# Patient Record
Sex: MALE | Race: OTHER | NOT HISPANIC OR LATINO
[De-identification: names, ages, dates, MRNs, and addresses within clinical notes are randomized per-mention and may not be internally consistent; named-entity substitution may affect disease eponyms.]

---

## 2017-01-05 ENCOUNTER — APPOINTMENT (OUTPATIENT)
Dept: DERMATOLOGY | Facility: CLINIC | Age: 67
End: 2017-01-05

## 2017-01-10 ENCOUNTER — APPOINTMENT (OUTPATIENT)
Dept: DERMATOLOGY | Facility: CLINIC | Age: 67
End: 2017-01-10

## 2017-01-10 VITALS
WEIGHT: 170 LBS | HEIGHT: 69 IN | DIASTOLIC BLOOD PRESSURE: 60 MMHG | SYSTOLIC BLOOD PRESSURE: 100 MMHG | BODY MASS INDEX: 25.18 KG/M2

## 2017-01-10 RX ORDER — CLOBETASOL PROPIONATE 0.5 MG/G
0.05 OINTMENT TOPICAL
Qty: 1 | Refills: 2 | Status: ACTIVE | COMMUNITY
Start: 2017-01-10 | End: 1900-01-01

## 2017-02-14 ENCOUNTER — APPOINTMENT (OUTPATIENT)
Dept: DERMATOLOGY | Facility: CLINIC | Age: 67
End: 2017-02-14

## 2017-02-14 VITALS — SYSTOLIC BLOOD PRESSURE: 120 MMHG | DIASTOLIC BLOOD PRESSURE: 80 MMHG

## 2017-03-23 ENCOUNTER — APPOINTMENT (OUTPATIENT)
Dept: DERMATOLOGY | Facility: CLINIC | Age: 67
End: 2017-03-23

## 2017-03-23 VITALS — DIASTOLIC BLOOD PRESSURE: 58 MMHG | SYSTOLIC BLOOD PRESSURE: 110 MMHG

## 2017-03-23 DIAGNOSIS — L30.9 DERMATITIS, UNSPECIFIED: ICD-10-CM

## 2017-04-18 ENCOUNTER — APPOINTMENT (OUTPATIENT)
Dept: DERMATOLOGY | Facility: CLINIC | Age: 67
End: 2017-04-18

## 2017-04-18 ENCOUNTER — LABORATORY RESULT (OUTPATIENT)
Age: 67
End: 2017-04-18

## 2017-04-18 VITALS — BODY MASS INDEX: 24.07 KG/M2 | SYSTOLIC BLOOD PRESSURE: 120 MMHG | WEIGHT: 163 LBS | DIASTOLIC BLOOD PRESSURE: 62 MMHG

## 2017-04-18 DIAGNOSIS — L98.9 DISORDER OF THE SKIN AND SUBCUTANEOUS TISSUE, UNSPECIFIED: ICD-10-CM

## 2017-05-18 ENCOUNTER — APPOINTMENT (OUTPATIENT)
Dept: DERMATOLOGY | Facility: CLINIC | Age: 67
End: 2017-05-18

## 2017-05-18 RX ORDER — GLIPIZIDE 10 MG/1
10 TABLET, FILM COATED, EXTENDED RELEASE ORAL
Qty: 60 | Refills: 0 | Status: ACTIVE | COMMUNITY
Start: 2017-01-17

## 2017-05-18 RX ORDER — METOPROLOL SUCCINATE 25 MG/1
25 TABLET, EXTENDED RELEASE ORAL
Qty: 30 | Refills: 0 | Status: ACTIVE | COMMUNITY
Start: 2017-02-13

## 2017-05-18 RX ORDER — BETAMETHASONE DIPROPIONATE 0.5 MG/G
0.05 CREAM, AUGMENTED TOPICAL
Qty: 50 | Refills: 0 | Status: ACTIVE | COMMUNITY
Start: 2017-05-17

## 2017-05-18 RX ORDER — METFORMIN HYDROCHLORIDE 1000 MG/1
1000 TABLET, COATED ORAL
Qty: 60 | Refills: 0 | Status: ACTIVE | COMMUNITY
Start: 2017-05-08

## 2017-05-18 RX ORDER — CICLOPIROX OLAMINE 7.7 MG/G
0.77 CREAM TOPICAL
Qty: 30 | Refills: 0 | Status: ACTIVE | COMMUNITY
Start: 2017-02-15

## 2017-05-18 RX ORDER — SITAGLIPTIN 100 MG/1
100 TABLET, FILM COATED ORAL
Qty: 30 | Refills: 0 | Status: ACTIVE | COMMUNITY
Start: 2017-01-17

## 2017-05-18 RX ORDER — BLOOD SUGAR DIAGNOSTIC
STRIP MISCELLANEOUS
Qty: 100 | Refills: 0 | Status: ACTIVE | COMMUNITY
Start: 2017-02-15

## 2017-06-15 ENCOUNTER — APPOINTMENT (OUTPATIENT)
Dept: DERMATOLOGY | Facility: CLINIC | Age: 67
End: 2017-06-15

## 2017-06-15 VITALS
WEIGHT: 163 LBS | DIASTOLIC BLOOD PRESSURE: 68 MMHG | SYSTOLIC BLOOD PRESSURE: 132 MMHG | BODY MASS INDEX: 24.14 KG/M2 | HEIGHT: 69 IN

## 2017-09-19 ENCOUNTER — APPOINTMENT (OUTPATIENT)
Dept: DERMATOLOGY | Facility: CLINIC | Age: 67
End: 2017-09-19
Payer: MEDICARE

## 2017-09-19 VITALS — SYSTOLIC BLOOD PRESSURE: 124 MMHG | DIASTOLIC BLOOD PRESSURE: 70 MMHG

## 2017-09-19 PROCEDURE — 54056 CRYOSURGERY PENIS LESION(S): CPT

## 2017-09-19 PROCEDURE — 99212 OFFICE O/P EST SF 10 MIN: CPT | Mod: 25

## 2017-10-04 ENCOUNTER — RX RENEWAL (OUTPATIENT)
Age: 67
End: 2017-10-04

## 2017-11-02 ENCOUNTER — APPOINTMENT (OUTPATIENT)
Dept: DERMATOLOGY | Facility: CLINIC | Age: 67
End: 2017-11-02
Payer: MEDICARE

## 2017-11-02 VITALS — DIASTOLIC BLOOD PRESSURE: 60 MMHG | SYSTOLIC BLOOD PRESSURE: 110 MMHG

## 2017-11-02 PROCEDURE — 99213 OFFICE O/P EST LOW 20 MIN: CPT | Mod: 25

## 2017-11-02 PROCEDURE — 54056 CRYOSURGERY PENIS LESION(S): CPT

## 2017-12-05 ENCOUNTER — RX RENEWAL (OUTPATIENT)
Age: 67
End: 2017-12-05

## 2017-12-19 ENCOUNTER — APPOINTMENT (OUTPATIENT)
Dept: DERMATOLOGY | Facility: CLINIC | Age: 67
End: 2017-12-19
Payer: MEDICARE

## 2017-12-19 VITALS — SYSTOLIC BLOOD PRESSURE: 122 MMHG | DIASTOLIC BLOOD PRESSURE: 68 MMHG

## 2017-12-19 PROCEDURE — 54056 CRYOSURGERY PENIS LESION(S): CPT

## 2017-12-19 PROCEDURE — 99213 OFFICE O/P EST LOW 20 MIN: CPT | Mod: 25

## 2018-01-23 ENCOUNTER — APPOINTMENT (OUTPATIENT)
Dept: DERMATOLOGY | Facility: CLINIC | Age: 68
End: 2018-01-23
Payer: MEDICARE

## 2018-01-23 PROCEDURE — 54056 CRYOSURGERY PENIS LESION(S): CPT

## 2018-01-23 PROCEDURE — 99212 OFFICE O/P EST SF 10 MIN: CPT | Mod: 25

## 2018-03-06 ENCOUNTER — APPOINTMENT (OUTPATIENT)
Dept: DERMATOLOGY | Facility: CLINIC | Age: 68
End: 2018-03-06
Payer: MEDICARE

## 2018-03-06 VITALS — DIASTOLIC BLOOD PRESSURE: 70 MMHG | SYSTOLIC BLOOD PRESSURE: 124 MMHG

## 2018-03-06 DIAGNOSIS — A63.0 ANOGENITAL (VENEREAL) WARTS: ICD-10-CM

## 2018-03-06 PROCEDURE — 99213 OFFICE O/P EST LOW 20 MIN: CPT | Mod: 25

## 2018-03-06 PROCEDURE — 54056 CRYOSURGERY PENIS LESION(S): CPT

## 2018-03-06 RX ORDER — IMIQUIMOD 50 MG/G
5 CREAM TOPICAL
Qty: 24 | Refills: 4 | Status: ACTIVE | COMMUNITY
Start: 2017-01-10 | End: 1900-01-01

## 2018-09-13 ENCOUNTER — APPOINTMENT (OUTPATIENT)
Dept: WOUND CARE | Facility: CLINIC | Age: 68
End: 2018-09-13
Payer: MEDICARE

## 2018-09-13 ENCOUNTER — INPATIENT (INPATIENT)
Facility: HOSPITAL | Age: 68
LOS: 7 days | Discharge: HOME | DRG: 617 | End: 2018-09-21
Attending: HOSPITALIST | Admitting: HOSPITALIST
Payer: COMMERCIAL

## 2018-09-13 VITALS
WEIGHT: 169.98 LBS | DIASTOLIC BLOOD PRESSURE: 79 MMHG | TEMPERATURE: 98 F | OXYGEN SATURATION: 97 % | HEART RATE: 94 BPM | HEIGHT: 68 IN | RESPIRATION RATE: 18 BRPM | SYSTOLIC BLOOD PRESSURE: 126 MMHG

## 2018-09-13 DIAGNOSIS — I25.10 ATHEROSCLEROTIC HEART DISEASE OF NATIVE CORONARY ARTERY WITHOUT ANGINA PECTORIS: ICD-10-CM

## 2018-09-13 DIAGNOSIS — M86.9 OSTEOMYELITIS, UNSPECIFIED: ICD-10-CM

## 2018-09-13 DIAGNOSIS — I10 ESSENTIAL (PRIMARY) HYPERTENSION: ICD-10-CM

## 2018-09-13 DIAGNOSIS — E78.5 HYPERLIPIDEMIA, UNSPECIFIED: ICD-10-CM

## 2018-09-13 DIAGNOSIS — Z90.49 ACQUIRED ABSENCE OF OTHER SPECIFIED PARTS OF DIGESTIVE TRACT: Chronic | ICD-10-CM

## 2018-09-13 DIAGNOSIS — Z95.5 PRESENCE OF CORONARY ANGIOPLASTY IMPLANT AND GRAFT: Chronic | ICD-10-CM

## 2018-09-13 DIAGNOSIS — Z29.9 ENCOUNTER FOR PROPHYLACTIC MEASURES, UNSPECIFIED: ICD-10-CM

## 2018-09-13 DIAGNOSIS — E11.8 TYPE 2 DIABETES MELLITUS WITH UNSPECIFIED COMPLICATIONS: ICD-10-CM

## 2018-09-13 LAB
ALBUMIN SERPL ELPH-MCNC: 4.3 G/DL — SIGNIFICANT CHANGE UP (ref 3.3–5)
ALP SERPL-CCNC: 75 U/L — SIGNIFICANT CHANGE UP (ref 40–120)
ALT FLD-CCNC: 18 U/L — SIGNIFICANT CHANGE UP (ref 10–45)
ANION GAP SERPL CALC-SCNC: 13 MMOL/L — SIGNIFICANT CHANGE UP (ref 5–17)
AST SERPL-CCNC: 23 U/L — SIGNIFICANT CHANGE UP (ref 10–40)
BASOPHILS # BLD AUTO: 0 K/UL — SIGNIFICANT CHANGE UP (ref 0–0.2)
BASOPHILS NFR BLD AUTO: 0.6 % — SIGNIFICANT CHANGE UP (ref 0–2)
BILIRUB SERPL-MCNC: 0.3 MG/DL — SIGNIFICANT CHANGE UP (ref 0.2–1.2)
BUN SERPL-MCNC: 22 MG/DL — SIGNIFICANT CHANGE UP (ref 7–23)
CALCIUM SERPL-MCNC: 9.9 MG/DL — SIGNIFICANT CHANGE UP (ref 8.4–10.5)
CHLORIDE SERPL-SCNC: 100 MMOL/L — SIGNIFICANT CHANGE UP (ref 96–108)
CO2 SERPL-SCNC: 24 MMOL/L — SIGNIFICANT CHANGE UP (ref 22–31)
CREAT SERPL-MCNC: 1.1 MG/DL — SIGNIFICANT CHANGE UP (ref 0.5–1.3)
CRP SERPL-MCNC: 0.11 MG/DL — SIGNIFICANT CHANGE UP (ref 0–0.4)
EOSINOPHIL # BLD AUTO: 0.1 K/UL — SIGNIFICANT CHANGE UP (ref 0–0.5)
EOSINOPHIL NFR BLD AUTO: 1.4 % — SIGNIFICANT CHANGE UP (ref 0–6)
ERYTHROCYTE [SEDIMENTATION RATE] IN BLOOD: 16 MM/HR — SIGNIFICANT CHANGE UP (ref 0–20)
GLUCOSE BLDC GLUCOMTR-MCNC: 131 MG/DL — HIGH (ref 70–99)
GLUCOSE SERPL-MCNC: 216 MG/DL — HIGH (ref 70–99)
HCT VFR BLD CALC: 42.6 % — SIGNIFICANT CHANGE UP (ref 39–50)
HGB BLD-MCNC: 14.3 G/DL — SIGNIFICANT CHANGE UP (ref 13–17)
LYMPHOCYTES # BLD AUTO: 1.4 K/UL — SIGNIFICANT CHANGE UP (ref 1–3.3)
LYMPHOCYTES # BLD AUTO: 19.7 % — SIGNIFICANT CHANGE UP (ref 13–44)
MCHC RBC-ENTMCNC: 29.9 PG — SIGNIFICANT CHANGE UP (ref 27–34)
MCHC RBC-ENTMCNC: 33.4 GM/DL — SIGNIFICANT CHANGE UP (ref 32–36)
MCV RBC AUTO: 89.4 FL — SIGNIFICANT CHANGE UP (ref 80–100)
MONOCYTES # BLD AUTO: 0.5 K/UL — SIGNIFICANT CHANGE UP (ref 0–0.9)
MONOCYTES NFR BLD AUTO: 7.3 % — SIGNIFICANT CHANGE UP (ref 2–14)
NEUTROPHILS # BLD AUTO: 5 K/UL — SIGNIFICANT CHANGE UP (ref 1.8–7.4)
NEUTROPHILS NFR BLD AUTO: 71 % — SIGNIFICANT CHANGE UP (ref 43–77)
PLATELET # BLD AUTO: 356 K/UL — SIGNIFICANT CHANGE UP (ref 150–400)
POTASSIUM SERPL-MCNC: 4.7 MMOL/L — SIGNIFICANT CHANGE UP (ref 3.5–5.3)
POTASSIUM SERPL-SCNC: 4.7 MMOL/L — SIGNIFICANT CHANGE UP (ref 3.5–5.3)
PROT SERPL-MCNC: 7.9 G/DL — SIGNIFICANT CHANGE UP (ref 6–8.3)
RBC # BLD: 4.77 M/UL — SIGNIFICANT CHANGE UP (ref 4.2–5.8)
RBC # FLD: 12.6 % — SIGNIFICANT CHANGE UP (ref 10.3–14.5)
SODIUM SERPL-SCNC: 137 MMOL/L — SIGNIFICANT CHANGE UP (ref 135–145)
WBC # BLD: 7 K/UL — SIGNIFICANT CHANGE UP (ref 3.8–10.5)
WBC # FLD AUTO: 7 K/UL — SIGNIFICANT CHANGE UP (ref 3.8–10.5)

## 2018-09-13 PROCEDURE — 99285 EMERGENCY DEPT VISIT HI MDM: CPT

## 2018-09-13 PROCEDURE — 99223 1ST HOSP IP/OBS HIGH 75: CPT

## 2018-09-13 PROCEDURE — 73630 X-RAY EXAM OF FOOT: CPT | Mod: 26,LT

## 2018-09-13 PROCEDURE — 99214 OFFICE O/P EST MOD 30 MIN: CPT

## 2018-09-13 RX ORDER — ASPIRIN/CALCIUM CARB/MAGNESIUM 324 MG
81 TABLET ORAL DAILY
Qty: 0 | Refills: 0 | Status: DISCONTINUED | OUTPATIENT
Start: 2018-09-13 | End: 2018-09-19

## 2018-09-13 RX ORDER — RANOLAZINE 500 MG/1
500 TABLET, FILM COATED, EXTENDED RELEASE ORAL
Qty: 0 | Refills: 0 | Status: DISCONTINUED | OUTPATIENT
Start: 2018-09-13 | End: 2018-09-19

## 2018-09-13 RX ORDER — CLOPIDOGREL BISULFATE 75 MG/1
75 TABLET, FILM COATED ORAL DAILY
Qty: 0 | Refills: 0 | Status: DISCONTINUED | OUTPATIENT
Start: 2018-09-13 | End: 2018-09-19

## 2018-09-13 RX ORDER — PIPERACILLIN AND TAZOBACTAM 4; .5 G/20ML; G/20ML
3.38 INJECTION, POWDER, LYOPHILIZED, FOR SOLUTION INTRAVENOUS EVERY 8 HOURS
Qty: 0 | Refills: 0 | Status: DISCONTINUED | OUTPATIENT
Start: 2018-09-13 | End: 2018-09-19

## 2018-09-13 RX ORDER — PIPERACILLIN AND TAZOBACTAM 4; .5 G/20ML; G/20ML
3.38 INJECTION, POWDER, LYOPHILIZED, FOR SOLUTION INTRAVENOUS ONCE
Qty: 0 | Refills: 0 | Status: COMPLETED | OUTPATIENT
Start: 2018-09-13 | End: 2018-09-13

## 2018-09-13 RX ORDER — METOPROLOL TARTRATE 50 MG
25 TABLET ORAL DAILY
Qty: 0 | Refills: 0 | Status: DISCONTINUED | OUTPATIENT
Start: 2018-09-13 | End: 2018-09-19

## 2018-09-13 RX ORDER — SODIUM CHLORIDE 9 MG/ML
1000 INJECTION, SOLUTION INTRAVENOUS
Qty: 0 | Refills: 0 | Status: DISCONTINUED | OUTPATIENT
Start: 2018-09-13 | End: 2018-09-19

## 2018-09-13 RX ORDER — VANCOMYCIN HCL 1 G
1000 VIAL (EA) INTRAVENOUS ONCE
Qty: 0 | Refills: 0 | Status: COMPLETED | OUTPATIENT
Start: 2018-09-13 | End: 2018-09-13

## 2018-09-13 RX ORDER — CILOSTAZOL 100 MG/1
50 TABLET ORAL
Qty: 0 | Refills: 0 | Status: DISCONTINUED | OUTPATIENT
Start: 2018-09-13 | End: 2018-09-19

## 2018-09-13 RX ORDER — DEXTROSE 50 % IN WATER 50 %
25 SYRINGE (ML) INTRAVENOUS ONCE
Qty: 0 | Refills: 0 | Status: DISCONTINUED | OUTPATIENT
Start: 2018-09-13 | End: 2018-09-19

## 2018-09-13 RX ORDER — GLUCAGON INJECTION, SOLUTION 0.5 MG/.1ML
1 INJECTION, SOLUTION SUBCUTANEOUS ONCE
Qty: 0 | Refills: 0 | Status: DISCONTINUED | OUTPATIENT
Start: 2018-09-13 | End: 2018-09-19

## 2018-09-13 RX ORDER — DEXTROSE 50 % IN WATER 50 %
15 SYRINGE (ML) INTRAVENOUS ONCE
Qty: 0 | Refills: 0 | Status: DISCONTINUED | OUTPATIENT
Start: 2018-09-13 | End: 2018-09-19

## 2018-09-13 RX ORDER — SIMVASTATIN 20 MG/1
80 TABLET, FILM COATED ORAL AT BEDTIME
Qty: 0 | Refills: 0 | Status: DISCONTINUED | OUTPATIENT
Start: 2018-09-13 | End: 2018-09-19

## 2018-09-13 RX ORDER — DEXTROSE 50 % IN WATER 50 %
12.5 SYRINGE (ML) INTRAVENOUS ONCE
Qty: 0 | Refills: 0 | Status: DISCONTINUED | OUTPATIENT
Start: 2018-09-13 | End: 2018-09-19

## 2018-09-13 RX ORDER — INSULIN LISPRO 100/ML
VIAL (ML) SUBCUTANEOUS
Qty: 0 | Refills: 0 | Status: DISCONTINUED | OUTPATIENT
Start: 2018-09-13 | End: 2018-09-19

## 2018-09-13 RX ORDER — VANCOMYCIN HCL 1 G
1000 VIAL (EA) INTRAVENOUS EVERY 12 HOURS
Qty: 0 | Refills: 0 | Status: DISCONTINUED | OUTPATIENT
Start: 2018-09-13 | End: 2018-09-19

## 2018-09-13 RX ADMIN — PIPERACILLIN AND TAZOBACTAM 200 GRAM(S): 4; .5 INJECTION, POWDER, LYOPHILIZED, FOR SOLUTION INTRAVENOUS at 16:48

## 2018-09-13 RX ADMIN — PIPERACILLIN AND TAZOBACTAM 3.38 GRAM(S): 4; .5 INJECTION, POWDER, LYOPHILIZED, FOR SOLUTION INTRAVENOUS at 18:51

## 2018-09-13 RX ADMIN — PIPERACILLIN AND TAZOBACTAM 25 GRAM(S): 4; .5 INJECTION, POWDER, LYOPHILIZED, FOR SOLUTION INTRAVENOUS at 21:58

## 2018-09-13 RX ADMIN — CILOSTAZOL 50 MILLIGRAM(S): 100 TABLET ORAL at 20:32

## 2018-09-13 RX ADMIN — Medication 81 MILLIGRAM(S): at 20:32

## 2018-09-13 RX ADMIN — Medication 1000 MILLIGRAM(S): at 19:51

## 2018-09-13 RX ADMIN — Medication 250 MILLIGRAM(S): at 16:48

## 2018-09-13 RX ADMIN — SIMVASTATIN 80 MILLIGRAM(S): 20 TABLET, FILM COATED ORAL at 21:58

## 2018-09-13 RX ADMIN — RANOLAZINE 500 MILLIGRAM(S): 500 TABLET, FILM COATED, EXTENDED RELEASE ORAL at 20:32

## 2018-09-13 NOTE — H&P ADULT - HISTORY OF PRESENT ILLNESS
67yo M with PMH HLD, CAD s/p stent on ASA/plavix, DMT2, presenting with left second toe wound x 3 weeks. Pt reports he first noticed wound while in [place], was started on antibiotics at that time. Pt had no improvement of wound, saw podiatrist 1 week ago and given amoxicillin. Pt reports he f/u today, had worsening of wound, and sent to ED for OM treatment. Patient denies any pain. Never had wound like this before. Denies any fever/chills, CP, SOB, abd pain, n/v. 68M with h/o  DMT2, HLD, CAD s/p stent on ASA/Plavix who presents  with c/o left second toe wound x 3 weeks. He denies LLE pain, fever/chills, CP, SOB, abd pain, n/v. Pt reports that he first noticed wound while in [place] where he was started on antibiotics. He had no improvement of wound and  saw Podiatry 1 week ago who prescribed Amoxicillin. He followed up with Podiatry today and was noted to have worsening of wound so he was sent to ED for further management.      ED COURSE  VS : 126/79  94  18  O2 97% on room air T 97.7F  Labs : wbc 7.0 h/h 14/42 plt 356 bun/cr 22/1.10  Treatment : zosyn 3.375g IVP x 1, Vancomycin 1g ivpb x 1  Seen by Podiatry

## 2018-09-13 NOTE — ED PROVIDER NOTE - OBJECTIVE STATEMENT
67yo M with PMH HLD, CAD s/p stent on ASA/plavix, DMT2, presenting with left second toe wound x 3 weeks. Pt reports he first noticed wound while in [place], was started on antibiotics at that time. Pt had no improvement of wound, saw podiatrist 1 week ago and given amoxicillin. Pt reports he f/u today, had worsening of wound, and sent to ED for OM treatment. Patient denies any pain. Never had wound like this before. Denies any fever/chills, CP, SOB, abd pain, n/v.     Podiatrist Ruben Whittaker  PCP Alejandra Jason ([facility])

## 2018-09-13 NOTE — ED PROVIDER NOTE - EXTREMITY EXAM
+ diffuse erythema/warmth/edema to left second toe with open bleeding wound at distal aspect, decreased sensation, ROM of foot/toes intact, DP pulse 2+/left lower extremity findings

## 2018-09-13 NOTE — H&P ADULT - PROBLEM SELECTOR PLAN 2
- well controlled  - pt is on Metformin, Januvia, invokana  - will hold oral hypoglycemics  - start CAMILLE  - FS qac qhs  - f/up Hb A1C  - Carb- consistent /DASH- TLC diet

## 2018-09-13 NOTE — PROGRESS NOTE ADULT - ASSESSMENT
68 year old male LEFT foot send digit ulceration, down to bone  - Pt was examined and evaluated   - WBC 7 ESR 16 CRP 0.11   - (+) Xray for OM, probing to bone wound --> pod surg planning digital vs. P2RR  - Wound culture taken --> c/w board spectrum Abx till c/s results --> rec ID c/s to follow  - Hx of occluded vessels per pt on previous vasc study --> rec FABBY/PVR as well as vasc c/s   - Rec MRI to evaluate extend of infection --> surg planning   - Please begin optimizing patient for the OR. Pt has history of CAD, rec Cards c/s as needed for possible cardiac optimization and risk stratification.  - Thank you for the consult  - D/w attending

## 2018-09-13 NOTE — H&P ADULT - EXTREMITIES COMMENTS
left LE :  erythema of left second toe with open bleeding wound at distal aspect, non tender, decreased sensation, intact ROM of foot/toes , DP pulse intact

## 2018-09-13 NOTE — H&P ADULT - FAMILY HISTORY
Sibling  Still living? Yes, Estimated age: Age Unknown  Family history of diabetes mellitus in brother, Age at diagnosis: Age Unknown

## 2018-09-13 NOTE — ED ADULT NURSE NOTE - OBJECTIVE STATEMENT
patient sent to ED by podiatrist to r/o osteomyelitis in left second toe. Patient has open wound to left second toe, swelling noted as well as foul smelling discharge. Patient states he was taking po antibiotics but has not had any improvement. Denies any fever or chills. Denies any pain in affected foot, patient states he has minimal sensation in his feet. Patient otherwise denies any pain or discomfort. Denies any vomiting or diarrhea. Patient is able to bear weight on affected foot and is able to ambulate.

## 2018-09-13 NOTE — H&P ADULT - ASSESSMENT
68M with h/o  DMT2, HLD, CAD s/p stent on ASA/Plavix who presents  with c/o left second toe wound x 3 weeks. He denies LLE pain, fever/chills. Wound was managed by outpatient Podiatry with oral abx without improvement. Physical exam is notable for redness of left second toe with open bleeding wound, malodorous drainage on distal aspect. Labs are unremarkable. Right foot XR  findings are consistent with osteomyelitis of left 2nd distal phalanx. 68M with h/o  DMT2, HLD, CAD s/p stent on ASA/Plavix who presents  with c/o left second toe wound x 3 weeks. He denies LLE pain, fever/chills. Wound was managed by outpatient Podiatry with oral abx without improvement. Physical exam is notable for redness of left second toe with open bleeding wound, malodorous drainage on distal aspect. Labs are unremarkable. Left foot XR  findings are consistent with osteomyelitis of left 2nd distal phalanx.

## 2018-09-13 NOTE — ED PROVIDER NOTE - ATTENDING CONTRIBUTION TO CARE
Patient with diabetes, developed ulcer on R 2nd toe, saw podiatry who put on antibiotics, has not improved, today saw podiatry who sent him to Emergency Department for admission.  No fevers, no chills, no other complaints.  On exam patient well appearing, vital signs within normal limits, RRR, non labored respirations, R foot 2nd toe with foul smelling ulcer.  Plain films obtained consistent with osteomyelitis of R 2nd distal phalanx, labs obtained, will start vanco/zosyn, consult podiatry, admit.

## 2018-09-13 NOTE — ED ADULT NURSE NOTE - NSIMPLEMENTINTERV_GEN_ALL_ED
Implemented All Fall with Harm Risk Interventions:  [place]aron to call system. Call bell, personal items and telephone within reach. Instruct patient to call for assistance. Room bathroom lighting operational. Non-slip footwear when patient is off stretcher. Physically safe environment: no spills, clutter or unnecessary equipment. Stretcher in lowest position, wheels locked, appropriate side rails in place. Provide visual cue, wrist band, yellow gown, etc. Monitor gait and stability. Monitor for mental status changes and reorient to person, place, and time. Review medications for side effects contributing to fall risk. Reinforce activity limits and safety measures with patient and family. Provide visual clues: red socks.

## 2018-09-13 NOTE — ED PROVIDER NOTE - PMH
CAD (coronary artery disease)    Diabetes  Type 2, Controoled, Uncomplicated, A1c: 6.4  HLD (hyperlipidemia)    HTN (hypertension)

## 2018-09-13 NOTE — H&P ADULT - NSHPLABSRESULTS_GEN_ALL_CORE
Labs reviewed :     Right foot XR  personally reviewed : osteomyelitis of left 2nd distal phalanx,    EKG personally reviewed : Labs reviewed : no leukocytosis, no anemia, bnp is wnl    Right foot XR  personally reviewed : osteomyelitis of left 2nd distal phalanx, Labs reviewed : no leukocytosis, no anemia, bnp is wnl    Left foot XR  personally reviewed : osteomyelitis of left 2nd distal phalanx,

## 2018-09-14 LAB
GLUCOSE BLDC GLUCOMTR-MCNC: 124 MG/DL — HIGH (ref 70–99)
GLUCOSE BLDC GLUCOMTR-MCNC: 177 MG/DL — HIGH (ref 70–99)
GLUCOSE BLDC GLUCOMTR-MCNC: 236 MG/DL — HIGH (ref 70–99)

## 2018-09-14 PROCEDURE — 93923 UPR/LXTR ART STDY 3+ LVLS: CPT | Mod: 26

## 2018-09-14 PROCEDURE — 93010 ELECTROCARDIOGRAM REPORT: CPT

## 2018-09-14 PROCEDURE — 73720 MRI LWR EXTREMITY W/O&W/DYE: CPT | Mod: 26,LT

## 2018-09-14 PROCEDURE — 99233 SBSQ HOSP IP/OBS HIGH 50: CPT

## 2018-09-14 RX ORDER — INFLUENZA VIRUS VACCINE 15; 15; 15; 15 UG/.5ML; UG/.5ML; UG/.5ML; UG/.5ML
0.5 SUSPENSION INTRAMUSCULAR ONCE
Qty: 0 | Refills: 0 | Status: COMPLETED | OUTPATIENT
Start: 2018-09-14 | End: 2018-09-21

## 2018-09-14 RX ADMIN — Medication 250 MILLIGRAM(S): at 05:50

## 2018-09-14 RX ADMIN — Medication 81 MILLIGRAM(S): at 12:21

## 2018-09-14 RX ADMIN — RANOLAZINE 500 MILLIGRAM(S): 500 TABLET, FILM COATED, EXTENDED RELEASE ORAL at 06:01

## 2018-09-14 RX ADMIN — Medication 25 MILLIGRAM(S): at 06:01

## 2018-09-14 RX ADMIN — PIPERACILLIN AND TAZOBACTAM 25 GRAM(S): 4; .5 INJECTION, POWDER, LYOPHILIZED, FOR SOLUTION INTRAVENOUS at 07:56

## 2018-09-14 RX ADMIN — Medication 1 TABLET(S): at 12:21

## 2018-09-14 RX ADMIN — Medication 2: at 13:19

## 2018-09-14 RX ADMIN — CILOSTAZOL 50 MILLIGRAM(S): 100 TABLET ORAL at 05:50

## 2018-09-14 RX ADMIN — SIMVASTATIN 80 MILLIGRAM(S): 20 TABLET, FILM COATED ORAL at 20:47

## 2018-09-14 RX ADMIN — RANOLAZINE 500 MILLIGRAM(S): 500 TABLET, FILM COATED, EXTENDED RELEASE ORAL at 18:23

## 2018-09-14 RX ADMIN — CLOPIDOGREL BISULFATE 75 MILLIGRAM(S): 75 TABLET, FILM COATED ORAL at 18:23

## 2018-09-14 RX ADMIN — PIPERACILLIN AND TAZOBACTAM 25 GRAM(S): 4; .5 INJECTION, POWDER, LYOPHILIZED, FOR SOLUTION INTRAVENOUS at 18:22

## 2018-09-14 RX ADMIN — Medication 250 MILLIGRAM(S): at 22:46

## 2018-09-14 NOTE — PROGRESS NOTE ADULT - PROBLEM SELECTOR PLAN 1
- c/w Zosyn/Vancomycin  - Podiatry recs appreciated - surgical intervention likely  - MRI scheduled for 8 pm tonight  - FABBY/PVRs

## 2018-09-14 NOTE — PROGRESS NOTE ADULT - ASSESSMENT
68M with h/o  DMT2, HLD, CAD s/p stent on ASA/Plavix who presents  with c/o left second toe wound x 3 weeks. He denies LLE pain, fever/chills. Wound was managed by outpatient Podiatry with oral abx without improvement. Physical exam is notable for redness of left second toe with open bleeding wound, malodorous drainage on distal aspect. Labs are unremarkable. Left foot XR  findings are consistent with osteomyelitis of left 2nd distal phalanx.

## 2018-09-14 NOTE — CONSULT NOTE ADULT - SUBJECTIVE AND OBJECTIVE BOX
HISTORY OF PRESENT ILLNESS:  68 year old male well known to our service with h/o  DMT2, HLD, CAD s/p stent on ASA/Plavix who presents  with c/o left second toe wound x 3 weeks. He denies LLE pain, fever/chills, CP, SOB, abd pain, n/v. Pt reports that he first noticed wound while in [place] where he was started on antibiotics. He had no improvement of wound and  saw Podiatry 1 week ago who prescribed Amoxicillin. He followed up with Podiatry today and was noted to have worsening of wound so he was sent to ED for further management.         Mr. Bee Harrington, in the office today for interventional cardiology consultation. As you know, he is a very pleasant 68 year old male with past medical history of hypertension, diabetes, hyperlipidemia, coronary artery disease status post stents four years ago in [place] with a repeat cath in 2016 that revealed diffuse 40% lesion in the LAD, 90% diagonal lesion, 50% obtuse marginal 1 lesion, recommended medical management, who presents today with complaints of one to two block claudication, now with worsening rest leg pain at nighttime in the left leg           PAST MEDICAL & SURGICAL HISTORY:  HTN (hypertension)  HLD (hyperlipidemia)  Diabetes: Type 2, Controoled, Uncomplicated, A1c: 6.4  CAD (coronary artery disease)  History of cholecystectomy  S/P primary angioplasty with coronary stent: x 4 stents        MEDICATIONS  (STANDING):  aspirin enteric coated 81 milliGRAM(s) Oral daily  cilostazol 50 milliGRAM(s) Oral two times a day  clopidogrel Tablet 75 milliGRAM(s) Oral daily    insulin lispro (HumaLOG) corrective regimen sliding scale   SubCutaneous three times a day before meals  metoprolol succinate ER 25 milliGRAM(s) Oral daily  multivitamin 1 Tablet(s) Oral daily  piperacillin/tazobactam IVPB. 3.375 Gram(s) IV Intermittent every 8 hours  ranolazine 500 milliGRAM(s) Oral two times a day  simvastatin 80 milliGRAM(s) Oral at bedtime  vancomycin  IVPB 1000 milliGRAM(s) IV Intermittent every 12 hours      Allergies  No Known Allergies    FAMILY HISTORY:  Family history of diabetes mellitus in brother (Sibling)  Non-contributary for premature coronary disease or sudden cardiac death    SOCIAL HISTORY:    [ x] Non-smoker  [ ] Smoker  [ x] Alcohol      REVIEW OF SYSTEMS:  [ ]chest pain  [  ]shortness of breath  [  ]palpitations  [  ]syncope  [ ]near syncope [ ]upper extremity weakness   [ ] lower extremity weakness  [  ]diplopia  [  ]altered mental status   [  ]fevers  [ ]chills [ ]nausea  [ ]vomitting  [  ]dysphagia    [ ]abdominal pain  [ ]melena  [ ]BRBPR    [  ]epistaxis  [  ]rash  [x] toe pain/wound  [ ]lower extremity edema        [x ] All others negative	  [ ] Unable to obtain    PHYSICAL EXAM:  T(C): 36.5 (09-14-18 @ 08:00), Max: 36.9 (09-14-18 @ 06:06)  HR: 75 (09-14-18 @ 08:00) (75 - 88)  BP: 115/76 (09-14-18 @ 08:00) (109/72 - 151/79)  RR: 19 (09-14-18 @ 08:00) (16 - 19)  SpO2: 98% (09-14-18 @ 08:00) (97% - 98%)  Wt(kg): --    Appearance: Normal	  HEENT:   Normal oral mucosa, PERRL, EOMI	  Lymphatic: No lymphadenopathy , no edema  Cardiovascular: Normal S1 S2, No JVD, No murmurs , Peripheral pulses palpable 2+ bilaterally  Respiratory: Lungs clear to auscultation, normal effort 	  Gastrointestinal:  Soft, Non-tender, + BS	  Skin:  left LE :  erythema of left second toe with open bleeding wound at distal aspect, non tender, decreased sensation, intact ROM of foot/toes , DP pulse intact	  No rashes, No ecchymoses, No cyanosis, warm to touch  Musculoskeletal: Normal range of motion, normal strength  Psychiatry:  Mood & affect appropriate      TELEMETRY: 	  n/a  ECG:  	 in our office 6/18 - NSR narrow QRS LVH     Echo:    from our office 1/10/18   Conclusions:   1. Normal left ventricular size with normal systolic function.   2. Mild diastolic dysfunction.   3. Normal right ventricular size and function.   4. Mild left atrial enlargement.       < from: Transthoracic Echocardiogram w/Doppler (10.24.12 @ 16:16) >  Conclusions:  1. Mitral annular calcification, otherwise normal mitral  valve. No mitral valve regurgitation seen.  2. Normal trileaflet aortic valve. No aortic valve  regurgitation seen.  3. Normal left atrium.  LA volume index = 20 cc/m2.  4. Normal left ventricular internal dimensions and wall  thickness.  5. Low normal left ventricular systolic function. EF 55%.  The basal inferior and infero-septal walls are severely  hypokinetic.  6. Mild diastolic dysfunction (Stage I).  7. Normal right atrium.  8. Normal right ventricular size and function.  9. Estimated right ventricular systolic pressure equals 27  mm Hg, assuming right atrial pressure equals 10 mm Hg,  consistent with normal pulmonary pressures.  10. Normal tricuspid valve. Mild tricuspid regurgitation.        Exercise NST:   1/26/18 in our office :   CONCLUSIONS: Abnormal Study.   Normal maximal exercise treadmill stress test.   Good exercise performance.   Abnormal myocardial perfusion SPECT images. Proximal and mid inferior wall MI with no evidence of   stress induced ischemia.   Mild segmental LV dysfunction. Calculated EF is 48%.          < from: Nuclear Stress Test, Exercise (06.27.12 @ 00:00) >  IMPRESSIONS:Abnormal Study  * Exercise capacity: 11 METS, Excellent for age and  gender.  * Chest Pain: No chest pain with exercise.  * Symptom: No Symptom.  * HR Response: Appropriate.  * BP Response: Appropriate.  * Heart Rhythm: Normal Sinus Rhythm - 79 BPM.  * ECG Changes: ST Depression: 2 mm slowly upsloping in  leads V4, V5, V6 started at 06:00 min of exercise at HR of  130 and persisted 07:00 min into recovery.  All Changes  resolved by 7 minutes of recovery.  * Arrhythmia: None.  * Myocardial Perfusion SPECT results are abnormal at 97 %  of MPHR.  * There are medium sized, moderate defects in the inferior  and inferoseptal walls that are fixed, consistent with  infarct; this defect partially corrects with prone  imgaing, suggesting a significant component of  diaphragmatic attenuation artifact is contributing to its  extent.  There is no evidence of significant ischemia.  * Post-stress gated wall motion analysis was performed  (LVEF = 49 %;LVEDV = 99 ml.)with  mild hypokinesis of the  inferior wall.  * Artifact was present as noted above.    < end of copied text >    Cath: < from: Cardiac Cath Lab - Adult (12.23.16 @ 11:27) >  CORONARY VESSELS: The coronary circulation is left dominant.  LM:   --  LM: There was a 25 % stenosis.  LAD:   --  Proximal LAD: There was a diffuse 40 % stenosis at the site of a  prior stent.  --  Distal LAD: Angiography showed mild atherosclerosis with no flow  limiting lesions.  --  D1: There was a 90 % stenosis.  CX:   --  Proximal circumflex: There was a tubular 30 % stenosis at the  site of a prior stent, in-stent.  --  OM1: There was a 50 % stenosis.  --  L AV groove: There was a tubular 20 % stenosis at the site of a prior  stent, in-stent.  --  LPDA: The vessel was very small sized. There was a 75 % stenosis.  RCA:   --  Proximal RCA: There was a 100 % stenosis.  COMPLICATIONS: There were no complications.  DIAGNOSTIC IMPRESSIONS: Patent LAD and LCx stents. Small vessel CAD in the  D1 and LPA (not amenable to PCI - too small)  DIAGNOSTIC RECOMMENDATIONS: Titrate medical therapy.  Prepared and signed by  Russell Wilder M.D.    < end of copied text >    	  	  LABS:	 	                          14.3   7.0   )-----------( 356      ( 13 Sep 2018 14:31 )             42.6     09-13    137  |  100  |  22  ----------------------------<  216<H>  4.7   |  24  |  1.10    Ca    9.9      13 Sep 2018 14:31    TPro  7.9  /  Alb  4.3  /  TBili  0.3  /  DBili  x   /  AST  23  /  ALT  18  /  AlkPhos  75  09-13      Xray Foot AP + Lateral + Oblique, Left (09.13.18 @ 15:01) >  IMPRESSION:  There is erosion of the second distal phalanx with only a portion of the   base remaining, which is dorsally displaced. Findings are compatible with   osteomyelitis.     Ulcer is noted at the tip of the second digit with surrounding soft   tissue swelling.        VA Physiol Extremity Lower 3+ Level, BI (09.14.18 @ 10:06) >  IMPRESSION:     Severe arterial occlusive disease of the left lower extremity at the   level of metatarsals. Limited compressibility/noncompressibility of the   arteries in the thigh through the ankles are suggestive of arterial   medial calcification in the setting of diabetes mellitus.    No evidence of significant arterial occlusive disease of the right lower   extremity at rest. Limited compressibility/noncompressibility of the   arteries in the thigh through the calves are suggestive of arterial   medial calcification in the setting of diabetes mellitus.          ASSESSMENT/PLAN: HISTORY OF PRESENT ILLNESS:  68 year old male well known to our service with HTN HLD DM CAD s/p stents 4 years ago in [place] with a repeat cath in 2016 that revealed diffuse 40% lesion in the LAD, 90% diagonal lesion, 50% obtuse marginal 1 lesion, treated with med management given small size of vessels not amenable to cath, with normal LV function on TTE 1/18 in our office with evidence of prox and mid inferior wall infarct but no ischemia on exercise NST in our office 1/18 (ef 48%) and no ischemia admitted with left non healing toe wound. He has failed oral antibiotics and presents for further management. He has no anginal symptoms.                 PAST MEDICAL & SURGICAL HISTORY:  HTN (hypertension)  HLD (hyperlipidemia)  Diabetes: Type 2, Controoled, Uncomplicated, A1c: 6.4  CAD (coronary artery disease)  History of cholecystectomy  S/P primary angioplasty with coronary stent: x 4 stents        MEDICATIONS  (STANDING):  aspirin enteric coated 81 milliGRAM(s) Oral daily  cilostazol 50 milliGRAM(s) Oral two times a day  clopidogrel Tablet 75 milliGRAM(s) Oral daily    insulin lispro (HumaLOG) corrective regimen sliding scale   SubCutaneous three times a day before meals  metoprolol succinate ER 25 milliGRAM(s) Oral daily  multivitamin 1 Tablet(s) Oral daily  piperacillin/tazobactam IVPB. 3.375 Gram(s) IV Intermittent every 8 hours  ranolazine 500 milliGRAM(s) Oral two times a day  simvastatin 80 milliGRAM(s) Oral at bedtime  vancomycin  IVPB 1000 milliGRAM(s) IV Intermittent every 12 hours      Allergies  No Known Allergies    FAMILY HISTORY:  Family history of diabetes mellitus in brother (Sibling)  Non-contributary for premature coronary disease or sudden cardiac death    SOCIAL HISTORY:    [ x] Non-smoker  [ ] Smoker  [ x] Alcohol      REVIEW OF SYSTEMS:  [ ]chest pain  [  ]shortness of breath  [  ]palpitations  [  ]syncope  [ ]near syncope [ ]upper extremity weakness   [ ] lower extremity weakness  [  ]diplopia  [  ]altered mental status   [  ]fevers  [ ]chills [ ]nausea  [ ]vomitting  [  ]dysphagia    [ ]abdominal pain  [ ]melena  [ ]BRBPR    [  ]epistaxis  [  ]rash  [x] toe pain/wound  [ ]lower extremity edema        [x ] All others negative	  [ ] Unable to obtain    PHYSICAL EXAM:  T(C): 36.5 (09-14-18 @ 08:00), Max: 36.9 (09-14-18 @ 06:06)  HR: 75 (09-14-18 @ 08:00) (75 - 88)  BP: 115/76 (09-14-18 @ 08:00) (109/72 - 151/79)  RR: 19 (09-14-18 @ 08:00) (16 - 19)  SpO2: 98% (09-14-18 @ 08:00) (97% - 98%)  Wt(kg): --    Appearance: Normal	  HEENT:   Normal oral mucosa, PERRL, EOMI	  Lymphatic: No lymphadenopathy , no edema  Cardiovascular: Normal S1 S2, No JVD, No murmurs , Peripheral pulses palpable 2+ bilaterally  Respiratory: Lungs clear to auscultation, normal effort 	  Gastrointestinal:  Soft, Non-tender, + BS	  Skin:  left LE :  erythema of left second toe with open bleeding wound at distal aspect, non tender, decreased sensation, intact ROM of foot/toes , DP pulse intact	  No rashes, No ecchymoses, No cyanosis, warm to touch  Musculoskeletal: Normal range of motion, normal strength  Psychiatry:  Mood & affect appropriate      TELEMETRY: 	  n/a  ECG:  	 in our office 6/18 - NSR narrow QRS LVH     Echo:    from our office 1/10/18   Conclusions:   1. Normal left ventricular size with normal systolic function.   2. Mild diastolic dysfunction.   3. Normal right ventricular size and function.   4. Mild left atrial enlargement.       < from: Transthoracic Echocardiogram w/Doppler (10.24.12 @ 16:16) >  Conclusions:  1. Mitral annular calcification, otherwise normal mitral  valve. No mitral valve regurgitation seen.  2. Normal trileaflet aortic valve. No aortic valve  regurgitation seen.  3. Normal left atrium.  LA volume index = 20 cc/m2.  4. Normal left ventricular internal dimensions and wall  thickness.  5. Low normal left ventricular systolic function. EF 55%.  The basal inferior and infero-septal walls are severely  hypokinetic.  6. Mild diastolic dysfunction (Stage I).  7. Normal right atrium.  8. Normal right ventricular size and function.  9. Estimated right ventricular systolic pressure equals 27  mm Hg, assuming right atrial pressure equals 10 mm Hg,  consistent with normal pulmonary pressures.  10. Normal tricuspid valve. Mild tricuspid regurgitation.        Exercise NST:   1/26/18 in our office :   CONCLUSIONS: Abnormal Study.   Normal maximal exercise treadmill stress test.   Good exercise performance.   Abnormal myocardial perfusion SPECT images. Proximal and mid inferior wall MI with no evidence of   stress induced ischemia.   Mild segmental LV dysfunction. Calculated EF is 48%.          < from: Nuclear Stress Test, Exercise (06.27.12 @ 00:00) >  IMPRESSIONS:Abnormal Study  * Exercise capacity: 11 METS, Excellent for age and  gender.  * Chest Pain: No chest pain with exercise.  * Symptom: No Symptom.  * HR Response: Appropriate.  * BP Response: Appropriate.  * Heart Rhythm: Normal Sinus Rhythm - 79 BPM.  * ECG Changes: ST Depression: 2 mm slowly upsloping in  leads V4, V5, V6 started at 06:00 min of exercise at HR of  130 and persisted 07:00 min into recovery.  All Changes  resolved by 7 minutes of recovery.  * Arrhythmia: None.  * Myocardial Perfusion SPECT results are abnormal at 97 %  of MPHR.  * There are medium sized, moderate defects in the inferior  and inferoseptal walls that are fixed, consistent with  infarct; this defect partially corrects with prone  imgaing, suggesting a significant component of  diaphragmatic attenuation artifact is contributing to its  extent.  There is no evidence of significant ischemia.  * Post-stress gated wall motion analysis was performed  (LVEF = 49 %;LVEDV = 99 ml.)with  mild hypokinesis of the  inferior wall.  * Artifact was present as noted above.    < end of copied text >    Cath: < from: Cardiac Cath Lab - Adult (12.23.16 @ 11:27) >  CORONARY VESSELS: The coronary circulation is left dominant.  LM:   --  LM: There was a 25 % stenosis.  LAD:   --  Proximal LAD: There was a diffuse 40 % stenosis at the site of a  prior stent.  --  Distal LAD: Angiography showed mild atherosclerosis with no flow  limiting lesions.  --  D1: There was a 90 % stenosis.  CX:   --  Proximal circumflex: There was a tubular 30 % stenosis at the  site of a prior stent, in-stent.  --  OM1: There was a 50 % stenosis.  --  L AV groove: There was a tubular 20 % stenosis at the site of a prior  stent, in-stent.  --  LPDA: The vessel was very small sized. There was a 75 % stenosis.  RCA:   --  Proximal RCA: There was a 100 % stenosis.  COMPLICATIONS: There were no complications.  DIAGNOSTIC IMPRESSIONS: Patent LAD and LCx stents. Small vessel CAD in the  D1 and LPA (not amenable to PCI - too small)  DIAGNOSTIC RECOMMENDATIONS: Titrate medical therapy.  Prepared and signed by  Russell Wilder M.D.    < end of copied text >    	  	  LABS:	 	                          14.3   7.0   )-----------( 356      ( 13 Sep 2018 14:31 )             42.6     09-13    137  |  100  |  22  ----------------------------<  216<H>  4.7   |  24  |  1.10    Ca    9.9      13 Sep 2018 14:31    TPro  7.9  /  Alb  4.3  /  TBili  0.3  /  DBili  x   /  AST  23  /  ALT  18  /  AlkPhos  75  09-13      Xray Foot AP + Lateral + Oblique, Left (09.13.18 @ 15:01) >  IMPRESSION:  There is erosion of the second distal phalanx with only a portion of the   base remaining, which is dorsally displaced. Findings are compatible with   osteomyelitis.     Ulcer is noted at the tip of the second digit with surrounding soft   tissue swelling.        VA Physiol Extremity Lower 3+ Level, BI (09.14.18 @ 10:06) >  IMPRESSION:     Severe arterial occlusive disease of the left lower extremity at the   level of metatarsals. Limited compressibility/noncompressibility of the   arteries in the thigh through the ankles are suggestive of arterial   medial calcification in the setting of diabetes mellitus.    No evidence of significant arterial occlusive disease of the right lower   extremity at rest. Limited compressibility/noncompressibility of the   arteries in the thigh through the calves are suggestive of arterial   medial calcification in the setting of diabetes mellitus.          ASSESSMENT/PLAN:  68 year old male well known to our service with HTN HLD DM CAD s/p stents 4 years ago in [place] with a repeat cath in 2016 that revealed diffuse 40% lesion in the LAD, 90% diagonal lesion, 50% obtuse marginal 1 lesion, treated with med management given small size of vessels not amenable to cath, with normal LV function on TTE 1/18 in our office with evidence of prox and mid inferior wall infarct but no ischemia on exercise NST in our office 1/18 (ef 48%) and no ischemia admitted with left non healing toe wound. He has failed oral antibiotics and presents for further management. He has no anginal symptoms.     -- check baseline EKG  -- c/w asa/statin/plavix/bb for CAD/stents  -- HD stable no evidence CHF  -- given evidence of osteomyelitis on left foot xray and severe PAD on FABBY/PVR likely will need surgery  -- podiatry noted - pending MRI LLE  -- per his RCRI score if he is for toe amputation only, he is low risk for a intermediate procedure however if he requires more invasive vascular surgery (such as bypass) he is moderate risk for a high risk procedure     - he has no anginal symptoms or evidence of CHF  -- c/w bb perioperative  -- will follow with you

## 2018-09-14 NOTE — CONSULT NOTE ADULT - SUBJECTIVE AND OBJECTIVE BOX
Patient seen and evaluated at bedside    Chief Complaint: preoperative evaluation    HPI:  68M with h/o  DMT2, HLD, CAD s/p 4 stents (in [place]) on ASA/Plavix who presents with c/o left second toe wound x 3 weeks. He denies LLE pain, fever/chills, CP, SOB, abd pain, n/v. Pt reports that he first noticed wound while in [place] where he was started on antibiotics. He had no improvement of wound and  saw Podiatry 1 week ago who prescribed Amoxicillin. He followed up with Podiatry today and was noted to have worsening of wound so he was sent to ED for further management and found to OM of the 2nd L distal phalanx. Patient is scheduled for the OR with podiatry and team is requesting pre-operative evaluation.    Patient has a history of 4 stents, 3 and 1 all of which were done in [place]. At that time, he presented with significant check pressure and neck pain. Patient does not follow up with a cardiologist.   Patient at baseline does not complain of any SOB, chest pain, palpitations, syncopal episodes. Patient states he is very active and does alot of travelling. He is able to do heavy work around the house without any complaints. Patient does admit to some SOB after 1 flight of stairs.     PMHx:   HTN (hypertension)  HLD (hyperlipidemia)  Diabetes  CAD (coronary artery disease)      PSHx:   History of cholecystectomy  S/P primary angioplasty with coronary stent      Allergies:  No Known Allergies      Current Medications:   aspirin enteric coated 81 milliGRAM(s) Oral daily  cilostazol 50 milliGRAM(s) Oral two times a day  clopidogrel Tablet 75 milliGRAM(s) Oral daily  dextrose 40% Gel 15 Gram(s) Oral once PRN  dextrose 5%. 1000 milliLiter(s) IV Continuous <Continuous>  dextrose 50% Injectable 12.5 Gram(s) IV Push once  dextrose 50% Injectable 25 Gram(s) IV Push once  dextrose 50% Injectable 25 Gram(s) IV Push once  glucagon  Injectable 1 milliGRAM(s) IntraMuscular once PRN  insulin lispro (HumaLOG) corrective regimen sliding scale   SubCutaneous three times a day before meals  metoprolol succinate ER 25 milliGRAM(s) Oral daily  multivitamin 1 Tablet(s) Oral daily  piperacillin/tazobactam IVPB. 3.375 Gram(s) IV Intermittent every 8 hours  ranolazine 500 milliGRAM(s) Oral two times a day  simvastatin 80 milliGRAM(s) Oral at bedtime  vancomycin  IVPB 1000 milliGRAM(s) IV Intermittent every 12 hours      FAMILY HISTORY:  Family history of diabetes mellitus in brother (Sibling)      Social History: , lives with spouse  Smoking History: denies  Alcohol Use: denies  Drug Use: denies    REVIEW OF SYSTEMS:  CONSTITUTIONAL: No weakness, fevers or chills  EYES/ENT: No visual changes;  No dysphagia  NECK: No pain or stiffness  RESPIRATORY: No cough, wheezing, hemoptysis; No shortness of breath  CARDIOVASCULAR: No chest pain or palpitations; No lower extremity edema  GASTROINTESTINAL: No abdominal or epigastric pain. No nausea, vomiting, or hematemesis; No diarrhea or constipation. No melena or hematochezia.  BACK: No back pain  GENITOURINARY: No dysuria, frequency or hematuria  NEUROLOGICAL: No numbness or weakness  SKIN: No itching, burning, rashes, or lesions   All other review of systems is negative unless indicated above.    Physical Exam:  T(F): 97.7 (09-14), Max: 98.5 (09-14)  HR: 75 (09-14) (75 - 88)  BP: 115/76 (09-14) (109/72 - 151/79)  RR: 19 (09-14)  SpO2: 98% (09-14)  GENERAL: No acute distress, well-developed  HEAD:  Atraumatic, Normocephalic  ENT: EOMI, PERRLA, conjunctiva and sclera clear, Neck supple, No JVD, moist mucosa  CHEST/LUNG: Clear to auscultation bilaterally; No wheeze, equal breath sounds bilaterally   BACK: No spinal tenderness  HEART: Regular rate and rhythm; No murmurs, rubs, or gallops  ABDOMEN: Soft, Nontender, Nondistended; Bowel sounds present  EXTREMITIES:  No clubbing, cyanosis, or edema  PSYCH: Nl behavior, nl affect  NEUROLOGY: AAOx3, non-focal, cranial nerves intact  SKIN: Normal color, No rashes or lesions      Cardiovascular Diagnostic Testing:    ECG: Personally reviewed:  < from: 12 Lead ECG (12.23.16 @ 10:18) >    Ventricular Rate 88 BPM    Atrial Rate 88 BPM    P-R Interval 150 ms    QRS Duration 92 ms     ms    QTc 423 ms    P Axis 51 degrees    R Axis -23 degrees    T Axis 3 degrees    Diagnosis Line NORMAL SINUS RHYTHM  INCREASED R/S RATIO IN V1, CONSIDER EARLY TRANSITION OR POSTERIOR INFARCT  ABNORMAL ECG    < end of copied text >      Cath:  < from: Cardiac Cath Lab - Adult (12.23.16 @ 11:27) >  CORONARY VESSELS: The coronary circulation is left dominant.  LM:   --  LM: There was a 25 % stenosis.  LAD:   --  Proximal LAD: There was a diffuse 40 % stenosis at the site of a  prior stent.  --  Distal LAD: Angiography showed mild atherosclerosis with no flow  limiting lesions.  --  D1: There was a 90 % stenosis.  CX:   --  Proximal circumflex: There was a tubular 30 % stenosis at the  site of a prior stent, in-stent.  --  OM1: There was a 50 % stenosis.  --  L AV groove: There was a tubular 20 % stenosis at the site of a prior  stent, in-stent.  --  LPDA: The vessel was very small sized. There was a 75 % stenosis.  RCA:   --  Proximal RCA: There was a 100 % stenosis.  COMPLICATIONS: There were no complications.  DIAGNOSTIC IMPRESSIONS: Patent LAD and LCx stents. Small vessel CAD in the  D1 and LPA (not amenable to PCI - too small)  DIAGNOSTIC RECOMMENDATIONS: Titrate medical therapy.    < end of copied text >      Labs: Personally reviewed                        14.3   7.0   )-----------( 356      ( 13 Sep 2018 14:31 )             42.6     09-13    137  |  100  |  22  ----------------------------<  216<H>  4.7   |  24  |  1.10    Ca    9.9      13 Sep 2018 14:31    TPro  7.9  /  Alb  4.3  /  TBili  0.3  /  DBili  x   /  AST  23  /  ALT  18  /  AlkPhos  75  09-13

## 2018-09-14 NOTE — PROGRESS NOTE ADULT - ASSESSMENT
68 year old male LEFT foot send digit ulceration, down to bone  - Pt was examined and evaluated   - WBC 7 ESR 16 CRP 0.11   - (+) Xray for OM, probing to bone wound --> pod surg planning digital vs. P2RR  - Wound culture taken --> c/w board spectrum Abx till c/s results --> rec ID c/s to follow  - Hx of occluded vessels per pt on previous vasc study --> LF TBI 0.28, rec vasc c/s for possible intervention and eval   - Rec MRI to evaluate extend of infection --> surg planning   -  Appreciate Card's risk stratification and clearance.   - Thank you for the consult  - D/w attending

## 2018-09-14 NOTE — CONSULT NOTE ADULT - ASSESSMENT
68M with h/o  DMT2, HLD, CAD s/p 4 stents (in [place]) on ASA/Plavix who presents with c/o left second toe wound x 3 weeks, found to have OM of 2nd left distal phalanx, requiring podiatric surgical intervention and needs pre-operative evaluation     - patient with moderate/good functional status (>5mets) & lack of cardiac symptoms   - scheduled for a low cardiac risk surgery  - RCRI 6.6% risk of major cardiac event    RECS:  CAD  - EKG unavailable ?  - continue Plavix/ASA    HLD  - continue statin perioperatively    HTN  - BPs well controlled with Toprol XL 25mg qd, continue perioperatively        full note to follow **************      Kalyani Bingham, PGY1

## 2018-09-15 DIAGNOSIS — I77.1 STRICTURE OF ARTERY: ICD-10-CM

## 2018-09-15 LAB
GLUCOSE BLDC GLUCOMTR-MCNC: 177 MG/DL — HIGH (ref 70–99)
GLUCOSE BLDC GLUCOMTR-MCNC: 183 MG/DL — HIGH (ref 70–99)
GLUCOSE BLDC GLUCOMTR-MCNC: 216 MG/DL — HIGH (ref 70–99)
GLUCOSE BLDC GLUCOMTR-MCNC: 266 MG/DL — HIGH (ref 70–99)
HBA1C BLD-MCNC: 7.1 % — HIGH (ref 4–5.6)
VANCOMYCIN TROUGH SERPL-MCNC: 12.8 UG/ML — SIGNIFICANT CHANGE UP (ref 10–20)

## 2018-09-15 PROCEDURE — 99223 1ST HOSP IP/OBS HIGH 75: CPT | Mod: GC

## 2018-09-15 PROCEDURE — 99233 SBSQ HOSP IP/OBS HIGH 50: CPT

## 2018-09-15 RX ORDER — INSULIN LISPRO 100/ML
3 VIAL (ML) SUBCUTANEOUS
Qty: 0 | Refills: 0 | Status: DISCONTINUED | OUTPATIENT
Start: 2018-09-15 | End: 2018-09-16

## 2018-09-15 RX ORDER — HEPARIN SODIUM 5000 [USP'U]/ML
5000 INJECTION INTRAVENOUS; SUBCUTANEOUS EVERY 8 HOURS
Qty: 0 | Refills: 0 | Status: DISCONTINUED | OUTPATIENT
Start: 2018-09-15 | End: 2018-09-19

## 2018-09-15 RX ORDER — INSULIN GLARGINE 100 [IU]/ML
6 INJECTION, SOLUTION SUBCUTANEOUS AT BEDTIME
Qty: 0 | Refills: 0 | Status: DISCONTINUED | OUTPATIENT
Start: 2018-09-15 | End: 2018-09-16

## 2018-09-15 RX ADMIN — Medication 250 MILLIGRAM(S): at 22:46

## 2018-09-15 RX ADMIN — RANOLAZINE 500 MILLIGRAM(S): 500 TABLET, FILM COATED, EXTENDED RELEASE ORAL at 17:36

## 2018-09-15 RX ADMIN — Medication 81 MILLIGRAM(S): at 11:16

## 2018-09-15 RX ADMIN — Medication 3 UNIT(S): at 17:34

## 2018-09-15 RX ADMIN — RANOLAZINE 500 MILLIGRAM(S): 500 TABLET, FILM COATED, EXTENDED RELEASE ORAL at 06:11

## 2018-09-15 RX ADMIN — Medication 1: at 09:13

## 2018-09-15 RX ADMIN — CLOPIDOGREL BISULFATE 75 MILLIGRAM(S): 75 TABLET, FILM COATED ORAL at 11:17

## 2018-09-15 RX ADMIN — Medication 3 UNIT(S): at 12:59

## 2018-09-15 RX ADMIN — HEPARIN SODIUM 5000 UNIT(S): 5000 INJECTION INTRAVENOUS; SUBCUTANEOUS at 14:04

## 2018-09-15 RX ADMIN — Medication 3: at 17:34

## 2018-09-15 RX ADMIN — HEPARIN SODIUM 5000 UNIT(S): 5000 INJECTION INTRAVENOUS; SUBCUTANEOUS at 22:47

## 2018-09-15 RX ADMIN — Medication 2: at 13:00

## 2018-09-15 RX ADMIN — Medication 1 TABLET(S): at 11:16

## 2018-09-15 RX ADMIN — INSULIN GLARGINE 6 UNIT(S): 100 INJECTION, SOLUTION SUBCUTANEOUS at 22:46

## 2018-09-15 RX ADMIN — PIPERACILLIN AND TAZOBACTAM 25 GRAM(S): 4; .5 INJECTION, POWDER, LYOPHILIZED, FOR SOLUTION INTRAVENOUS at 17:36

## 2018-09-15 RX ADMIN — CILOSTAZOL 50 MILLIGRAM(S): 100 TABLET ORAL at 22:47

## 2018-09-15 RX ADMIN — SIMVASTATIN 80 MILLIGRAM(S): 20 TABLET, FILM COATED ORAL at 22:47

## 2018-09-15 RX ADMIN — Medication 25 MILLIGRAM(S): at 06:11

## 2018-09-15 RX ADMIN — PIPERACILLIN AND TAZOBACTAM 25 GRAM(S): 4; .5 INJECTION, POWDER, LYOPHILIZED, FOR SOLUTION INTRAVENOUS at 01:58

## 2018-09-15 RX ADMIN — Medication 250 MILLIGRAM(S): at 09:25

## 2018-09-15 RX ADMIN — PIPERACILLIN AND TAZOBACTAM 25 GRAM(S): 4; .5 INJECTION, POWDER, LYOPHILIZED, FOR SOLUTION INTRAVENOUS at 11:15

## 2018-09-15 RX ADMIN — CILOSTAZOL 50 MILLIGRAM(S): 100 TABLET ORAL at 11:15

## 2018-09-15 NOTE — CONSULT NOTE ADULT - ASSESSMENT
ASSESSMENT: Patient is a 68M with L 2nd toe wound, planned for podiatry intervention. Vascular surgery consulted to evaluate blood flow to lower extremity.    PLAN:  - Left foot with palpable DP pulse, PT signal, indicating adequate blood flow to foot. Unlikely to be much utility in further diagnostic testing.    Discussed with vascular surgery fellow    Joao Caldera, PGY-2  Vascular Surgery [phone] ASSESSMENT: Patient is a 68M with L 2nd toe wound, planned for podiatry intervention. Vascular surgery consulted to evaluate blood flow to lower extremity.    PLAN:  -d/w pt lle angio indications risks and benefits  pt consents  tent  angio mon  will follow

## 2018-09-15 NOTE — CONSULT NOTE ADULT - SUBJECTIVE AND OBJECTIVE BOX
VASCULAR SURGERY CONSULT NOTE  --------------------------------------------------------------------------------------------    HPI:  68M with h/o  DMT2, HLD, CAD s/p stent on ASA/Plavix who presents  with c/o left second toe wound x 3 weeks. He denies LLE pain, fever/chills, CP, SOB, abd pain, n/v. Pt reports that he first noticed wound while in [place] where he was started on antibiotics. He had no improvement of wound and  saw Podiatry 1 week ago who prescribed Amoxicillin. He followed up with Podiatry today and was noted to have worsening of wound so he was sent to ED for further management.      ED COURSE  VS : 126/79  94  18  O2 97% on room air T 97.7F  Labs : wbc 7.0 h/h 14/42 plt 356 bun/cr 22/1.10  Treatment : zosyn 3.375g IVP x 1, Vancomycin 1g ivpb x 1  Seen by Podiatry    ROS: 10-system review is otherwise negative except HPI above.        PAST MEDICAL & SURGICAL HISTORY:  HTN (hypertension)  HLD (hyperlipidemia)  Diabetes: Type 2, Controoled, Uncomplicated, A1c: 6.4  CAD (coronary artery disease)  History of cholecystectomy  S/P primary angioplasty with coronary stent: x 4 stents      FAMILY HISTORY:  Family history of diabetes mellitus in brother (Sibling)      ALLERGIES: No Known Allergies      CURRENT MEDICATIONS  MEDICATIONS (STANDING): aspirin enteric coated 81 milliGRAM(s) Oral daily  cilostazol 50 milliGRAM(s) Oral two times a day  clopidogrel Tablet 75 milliGRAM(s) Oral daily  dextrose 5%. 1000 milliLiter(s) IV Continuous <Continuous>  dextrose 50% Injectable 12.5 Gram(s) IV Push once  dextrose 50% Injectable 25 Gram(s) IV Push once  dextrose 50% Injectable 25 Gram(s) IV Push once  influenza   Vaccine 0.5 milliLiter(s) IntraMuscular once  insulin lispro (HumaLOG) corrective regimen sliding scale   SubCutaneous three times a day before meals  metoprolol succinate ER 25 milliGRAM(s) Oral daily  multivitamin 1 Tablet(s) Oral daily  piperacillin/tazobactam IVPB. 3.375 Gram(s) IV Intermittent every 8 hours  ranolazine 500 milliGRAM(s) Oral two times a day  simvastatin 80 milliGRAM(s) Oral at bedtime  vancomycin  IVPB 1000 milliGRAM(s) IV Intermittent every 12 hours    MEDICATIONS (PRN):dextrose 40% Gel 15 Gram(s) Oral once PRN Blood Glucose LESS THAN 70 milliGRAM(s)/deciliter  glucagon  Injectable 1 milliGRAM(s) IntraMuscular once PRN Glucose LESS THAN 70 milligrams/deciliter    --------------------------------------------------------------------------------------------    Vitals:   T(C): 36.4 (09-14-18 @ 20:22), Max: 36.9 (09-14-18 @ 06:06)  HR: 82 (09-14-18 @ 20:22) (75 - 85)  BP: 113/75 (09-14-18 @ 20:22) (108/67 - 115/76)  RR: 18 (09-14-18 @ 20:22) (18 - 19)  SpO2: 96% (09-14-18 @ 20:22) (96% - 98%)  CAPILLARY BLOOD GLUCOSE      POCT Blood Glucose.: 177 mg/dL (14 Sep 2018 22:12)  POCT Blood Glucose.: 124 mg/dL (14 Sep 2018 18:21)  POCT Blood Glucose.: 236 mg/dL (14 Sep 2018 13:08)      09-14 @ 07:01  -  09-15 @ 03:01  --------------------------------------------------------  IN:    IV PiggyBack: 350 mL    Oral Fluid: 480 mL  Total IN: 830 mL    OUT:    Voided: 1 mL  Total OUT: 1 mL    Total NET: 829 mL    Height (cm): 172.72 (09-13 @ 12:27)  Weight (kg): 77.1 (09-13 @ 12:27)  BMI (kg/m2): 25.8 (09-13 @ 12:27)  BSA (m2): 1.91 (09-13 @ 12:27)      PHYSICAL EXAM:  General: NAD, Lying in bed comfortably  Neuro: A+Ox3  HEENT: NC/AT, EOMI  Neck: Soft, supple  Cardio: RRR, nml S1/S2  Resp: Good effort, CTA b/l  GI/Abd: Soft, NT/ND, no rebound/guarding, no masses palpated  Vascular:  - LLE: Left 2nd toe with open wound, necrotic appearing. Palpable DP pulse. PT signal present      --------------------------------------------------------------------------------------------    LABS                --------------------------------------------------------------------------------------------    MICROBIOLOGY    -> .Abscess Wound Culture (09-13 @ 21:39)     NG    NG    Few Corynebacterium species  "Susceptibilities not performed"    -> .Blood Blood Culture (09-13 @ 16:54)     NG    NG    No growth to date.      -------------------------------------------------------------------------------------------- VASCULAR SURGERY CONSULT NOTE  --------------------------------------------------------------------------------------------    HPI:  68M with h/o  DMT2, HLD, CAD s/p stent on ASA/Plavix who presents  with c/o left second toe wound x 3 weeks. He denies LLE pain, fever/chills, CP, SOB, abd pain, n/v. Pt reports that he first noticed wound while in [place] where he was started on antibiotics. He had no improvement of wound and  saw Podiatry 1 week ago who prescribed Amoxicillin. He followed up with Podiatry today and was noted to have worsening of wound so he was sent to ED for further management.    pt denies intermittent claudication or noct foot or leg cramps     ED COURSE  VS : 126/79  94  18  O2 97% on room air T 97.7F  Labs : wbc 7.0 h/h 14/42 plt 356 bun/cr 22/1.10  Treatment : zosyn 3.375g IVP x 1, Vancomycin 1g ivpb x 1  Seen by Podiatry    ROS: 10-system review is otherwise negative except HPI above.        PAST MEDICAL & SURGICAL HISTORY:  HTN (hypertension)  HLD (hyperlipidemia)  Diabetes: Type 2, Controoled, Uncomplicated, A1c: 6.4  CAD (coronary artery disease)  History of cholecystectomy  S/P primary angioplasty with coronary stent: x 4 stents      FAMILY HISTORY:  Family history of diabetes mellitus in brother (Sibling)      ALLERGIES: No Known Allergies      CURRENT MEDICATIONS  MEDICATIONS (STANDING): aspirin enteric coated 81 milliGRAM(s) Oral daily  cilostazol 50 milliGRAM(s) Oral two times a day  clopidogrel Tablet 75 milliGRAM(s) Oral daily  dextrose 5%. 1000 milliLiter(s) IV Continuous <Continuous>  dextrose 50% Injectable 12.5 Gram(s) IV Push once  dextrose 50% Injectable 25 Gram(s) IV Push once  dextrose 50% Injectable 25 Gram(s) IV Push once  influenza   Vaccine 0.5 milliLiter(s) IntraMuscular once  insulin lispro (HumaLOG) corrective regimen sliding scale   SubCutaneous three times a day before meals  metoprolol succinate ER 25 milliGRAM(s) Oral daily  multivitamin 1 Tablet(s) Oral daily  piperacillin/tazobactam IVPB. 3.375 Gram(s) IV Intermittent every 8 hours  ranolazine 500 milliGRAM(s) Oral two times a day  simvastatin 80 milliGRAM(s) Oral at bedtime  vancomycin  IVPB 1000 milliGRAM(s) IV Intermittent every 12 hours    MEDICATIONS (PRN):dextrose 40% Gel 15 Gram(s) Oral once PRN Blood Glucose LESS THAN 70 milliGRAM(s)/deciliter  glucagon  Injectable 1 milliGRAM(s) IntraMuscular once PRN Glucose LESS THAN 70 milligrams/deciliter    --------------------------------------------------------------------------------------------    Vitals:   T(C): 36.4 (09-14-18 @ 20:22), Max: 36.9 (09-14-18 @ 06:06)  HR: 82 (09-14-18 @ 20:22) (75 - 85)  BP: 113/75 (09-14-18 @ 20:22) (108/67 - 115/76)  RR: 18 (09-14-18 @ 20:22) (18 - 19)  SpO2: 96% (09-14-18 @ 20:22) (96% - 98%)  CAPILLARY BLOOD GLUCOSE      POCT Blood Glucose.: 177 mg/dL (14 Sep 2018 22:12)  POCT Blood Glucose.: 124 mg/dL (14 Sep 2018 18:21)  POCT Blood Glucose.: 236 mg/dL (14 Sep 2018 13:08)      09-14 @ 07:01  -  09-15 @ 03:01  --------------------------------------------------------  IN:    IV PiggyBack: 350 mL    Oral Fluid: 480 mL  Total IN: 830 mL    OUT:    Voided: 1 mL  Total OUT: 1 mL    Total NET: 829 mL    Height (cm): 172.72 (09-13 @ 12:27)  Weight (kg): 77.1 (09-13 @ 12:27)  BMI (kg/m2): 25.8 (09-13 @ 12:27)  BSA (m2): 1.91 (09-13 @ 12:27)      PHYSICAL EXAM:  General: NAD, Lying in bed comfortably  Neuro: A+Ox3  HEENT: NC/AT, EOMI  Neck: Soft, supple  Cardio: RRR, nml S1/S2  Resp: Good effort, CTA b/l  GI/Abd: Soft, NT/ND, no rebound/guarding, no masses palpated  Vascular:  - LLE: Left 2nd toe with open wound, necrotic appearing. Palpable DP pulse. PT signal present      --------------------------------------------------------------------------------------------    LABS                        14.3   7.0   )-----------( 356      ( 13 Sep 2018 14:31 )             42.6   09-13    137  |  100  |  22  ----------------------------<  216<H>  4.7   |  24  |  1.10    Ca    9.9      13 Sep 2018 14:31    TPro  7.9  /  Alb  4.3  /  TBili  0.3  /  DBili  x   /  AST  23  /  ALT  18  /  AlkPhos  75  09-13      --------------------------------------------------------------------------------------------    MICROBIOLOGY    -> .Abscess Wound Culture (09-13 @ 21:39)     NG    NG    Few Corynebacterium species  "Susceptibilities not performed"    -> .Blood Blood Culture (09-13 @ 16:54)     NG    NG    No growth to date.      --------------------------------------------------------------------------------------------

## 2018-09-15 NOTE — PROGRESS NOTE ADULT - PROBLEM SELECTOR PLAN 1
- c/w Zosyn/Vancomycin  - Podiatry recs appreciated - surgical intervention likely  - Follow up with MRI results  - FABBY reveals Severe arterial occlusive disease of the left lower extremity at the   level of metatarsals  - Follow up with Vascular Surgery

## 2018-09-16 LAB
ANION GAP SERPL CALC-SCNC: 10 MMOL/L — SIGNIFICANT CHANGE UP (ref 5–17)
BLD GP AB SCN SERPL QL: NEGATIVE — SIGNIFICANT CHANGE UP
BUN SERPL-MCNC: 20 MG/DL — SIGNIFICANT CHANGE UP (ref 7–23)
CALCIUM SERPL-MCNC: 9.4 MG/DL — SIGNIFICANT CHANGE UP (ref 8.4–10.5)
CHLORIDE SERPL-SCNC: 102 MMOL/L — SIGNIFICANT CHANGE UP (ref 96–108)
CO2 SERPL-SCNC: 26 MMOL/L — SIGNIFICANT CHANGE UP (ref 22–31)
CREAT SERPL-MCNC: 1.1 MG/DL — SIGNIFICANT CHANGE UP (ref 0.5–1.3)
GLUCOSE BLDC GLUCOMTR-MCNC: 171 MG/DL — HIGH (ref 70–99)
GLUCOSE BLDC GLUCOMTR-MCNC: 178 MG/DL — HIGH (ref 70–99)
GLUCOSE BLDC GLUCOMTR-MCNC: 194 MG/DL — HIGH (ref 70–99)
GLUCOSE BLDC GLUCOMTR-MCNC: 233 MG/DL — HIGH (ref 70–99)
GLUCOSE SERPL-MCNC: 215 MG/DL — HIGH (ref 70–99)
HCT VFR BLD CALC: 43.3 % — SIGNIFICANT CHANGE UP (ref 39–50)
HGB BLD-MCNC: 14.6 G/DL — SIGNIFICANT CHANGE UP (ref 13–17)
MCHC RBC-ENTMCNC: 30 PG — SIGNIFICANT CHANGE UP (ref 27–34)
MCHC RBC-ENTMCNC: 33.7 GM/DL — SIGNIFICANT CHANGE UP (ref 32–36)
MCV RBC AUTO: 89.1 FL — SIGNIFICANT CHANGE UP (ref 80–100)
PLATELET # BLD AUTO: 261 K/UL — SIGNIFICANT CHANGE UP (ref 150–400)
POTASSIUM SERPL-MCNC: 4.5 MMOL/L — SIGNIFICANT CHANGE UP (ref 3.5–5.3)
POTASSIUM SERPL-SCNC: 4.5 MMOL/L — SIGNIFICANT CHANGE UP (ref 3.5–5.3)
RBC # BLD: 4.86 M/UL — SIGNIFICANT CHANGE UP (ref 4.2–5.8)
RBC # FLD: 13.3 % — SIGNIFICANT CHANGE UP (ref 10.3–14.5)
RH IG SCN BLD-IMP: NEGATIVE — SIGNIFICANT CHANGE UP
SODIUM SERPL-SCNC: 138 MMOL/L — SIGNIFICANT CHANGE UP (ref 135–145)
WBC # BLD: 5.85 K/UL — SIGNIFICANT CHANGE UP (ref 3.8–10.5)
WBC # FLD AUTO: 5.85 K/UL — SIGNIFICANT CHANGE UP (ref 3.8–10.5)

## 2018-09-16 PROCEDURE — 99233 SBSQ HOSP IP/OBS HIGH 50: CPT

## 2018-09-16 PROCEDURE — 71045 X-RAY EXAM CHEST 1 VIEW: CPT | Mod: 26

## 2018-09-16 PROCEDURE — 99232 SBSQ HOSP IP/OBS MODERATE 35: CPT

## 2018-09-16 PROCEDURE — 99222 1ST HOSP IP/OBS MODERATE 55: CPT | Mod: GC

## 2018-09-16 PROCEDURE — 93010 ELECTROCARDIOGRAM REPORT: CPT

## 2018-09-16 RX ORDER — INSULIN GLARGINE 100 [IU]/ML
10 INJECTION, SOLUTION SUBCUTANEOUS AT BEDTIME
Qty: 0 | Refills: 0 | Status: DISCONTINUED | OUTPATIENT
Start: 2018-09-16 | End: 2018-09-18

## 2018-09-16 RX ORDER — CHLORHEXIDINE GLUCONATE 213 G/1000ML
1 SOLUTION TOPICAL ONCE
Qty: 0 | Refills: 0 | Status: DISCONTINUED | OUTPATIENT
Start: 2018-09-16 | End: 2018-09-17

## 2018-09-16 RX ORDER — INSULIN LISPRO 100/ML
6 VIAL (ML) SUBCUTANEOUS
Qty: 0 | Refills: 0 | Status: DISCONTINUED | OUTPATIENT
Start: 2018-09-16 | End: 2018-09-19

## 2018-09-16 RX ADMIN — CILOSTAZOL 50 MILLIGRAM(S): 100 TABLET ORAL at 11:17

## 2018-09-16 RX ADMIN — SIMVASTATIN 80 MILLIGRAM(S): 20 TABLET, FILM COATED ORAL at 21:11

## 2018-09-16 RX ADMIN — Medication 25 MILLIGRAM(S): at 06:32

## 2018-09-16 RX ADMIN — Medication 2: at 09:00

## 2018-09-16 RX ADMIN — Medication 6 UNIT(S): at 13:12

## 2018-09-16 RX ADMIN — PIPERACILLIN AND TAZOBACTAM 25 GRAM(S): 4; .5 INJECTION, POWDER, LYOPHILIZED, FOR SOLUTION INTRAVENOUS at 17:26

## 2018-09-16 RX ADMIN — Medication 250 MILLIGRAM(S): at 09:01

## 2018-09-16 RX ADMIN — HEPARIN SODIUM 5000 UNIT(S): 5000 INJECTION INTRAVENOUS; SUBCUTANEOUS at 06:31

## 2018-09-16 RX ADMIN — CLOPIDOGREL BISULFATE 75 MILLIGRAM(S): 75 TABLET, FILM COATED ORAL at 11:17

## 2018-09-16 RX ADMIN — Medication 1: at 13:12

## 2018-09-16 RX ADMIN — Medication 81 MILLIGRAM(S): at 11:17

## 2018-09-16 RX ADMIN — RANOLAZINE 500 MILLIGRAM(S): 500 TABLET, FILM COATED, EXTENDED RELEASE ORAL at 17:26

## 2018-09-16 RX ADMIN — PIPERACILLIN AND TAZOBACTAM 25 GRAM(S): 4; .5 INJECTION, POWDER, LYOPHILIZED, FOR SOLUTION INTRAVENOUS at 11:17

## 2018-09-16 RX ADMIN — Medication 1 TABLET(S): at 11:17

## 2018-09-16 RX ADMIN — RANOLAZINE 500 MILLIGRAM(S): 500 TABLET, FILM COATED, EXTENDED RELEASE ORAL at 06:32

## 2018-09-16 RX ADMIN — HEPARIN SODIUM 5000 UNIT(S): 5000 INJECTION INTRAVENOUS; SUBCUTANEOUS at 21:11

## 2018-09-16 RX ADMIN — CILOSTAZOL 50 MILLIGRAM(S): 100 TABLET ORAL at 21:11

## 2018-09-16 RX ADMIN — Medication 3 UNIT(S): at 09:00

## 2018-09-16 RX ADMIN — Medication 1: at 17:25

## 2018-09-16 RX ADMIN — Medication 250 MILLIGRAM(S): at 22:30

## 2018-09-16 RX ADMIN — PIPERACILLIN AND TAZOBACTAM 25 GRAM(S): 4; .5 INJECTION, POWDER, LYOPHILIZED, FOR SOLUTION INTRAVENOUS at 01:47

## 2018-09-16 RX ADMIN — Medication 6 UNIT(S): at 17:26

## 2018-09-16 RX ADMIN — INSULIN GLARGINE 10 UNIT(S): 100 INJECTION, SOLUTION SUBCUTANEOUS at 22:30

## 2018-09-16 RX ADMIN — HEPARIN SODIUM 5000 UNIT(S): 5000 INJECTION INTRAVENOUS; SUBCUTANEOUS at 13:13

## 2018-09-16 NOTE — CONSULT NOTE ADULT - SUBJECTIVE AND OBJECTIVE BOX
HPI:    68 year old male with PMH DM2 (A1C 7.1), CAD s/p stent on ASA/Plavix, HLD who presented to [facility] on 9/13 with left second toe wound of ~3 week duration. Patient noted being in [place] ~1 month ago and was admitted to the hospital for cellulitis at the same site and was treated with IV antibiotics and then transitioned to PO antibiotics. Patient is unsure of the name of either antibiotic. After return from [place] to the  he has been following with a Podiatrist who has been performing superficial debridements and wound care. No improvement in the wound despite this. He saw Podiatry ~1 week ago and was prescribed Amoxicillin. He followed up with Podiatry on the day of admission and given persistence of the wound was referred for admission.     In the ED afebrile, normotensive and not tachycardic. WBC on presentation of 7.0 without bands. ESR 16. CRP of 0.11. XR of L foot with erosion of the second distal phalanx with only a portion of the base remaining, which is dorsally displaced. Findings are compatible with osteomyelitis. Patient started on Vancomycin and Zosyn. MRI of L foot with sinus tracts at the tip of the second digit with osteomyelitis throughout the second distal and middle phalanges and at the head of the second proximal phalanx. Superficial wound cultures with Corynebacterium.     PAST MEDICAL & SURGICAL HISTORY:  HTN (hypertension)  HLD (hyperlipidemia)  Diabetes: Type 2, Controoled, Uncomplicated, A1c: 6.4  CAD (coronary artery disease)  History of cholecystectomy  S/P primary angioplasty with coronary stent: x 4 stents      Allergies  No Known Allergies        ANTIMICROBIALS:  piperacillin/tazobactam IVPB. 3.375 every 8 hours  vancomycin  IVPB 1000 every 12 hours      OTHER MEDS: MEDICATIONS  (STANDING):  aspirin enteric coated 81 daily  cilostazol 50 two times a day  clopidogrel Tablet 75 daily  dextrose 40% Gel 15 once PRN  dextrose 50% Injectable 12.5 once  dextrose 50% Injectable 25 once  dextrose 50% Injectable 25 once  glucagon  Injectable 1 once PRN  heparin  Injectable 5000 every 8 hours  influenza   Vaccine 0.5 once  insulin glargine Injectable (LANTUS) 10 at bedtime  insulin lispro (HumaLOG) corrective regimen sliding scale  three times a day before meals  insulin lispro Injectable (HumaLOG) 6 three times a day before meals  metoprolol succinate ER 25 daily  ranolazine 500 two times a day  simvastatin 80 at bedtime      SOCIAL HISTORY: Denies smoking or EtOH use. No recreational drug use. Born in [place] and moved to [place] in 1960's. Most recently travelled to [place] and [place] one month ago.     FAMILY HISTORY:  Family history of diabetes mellitus in brother (Sibling)      REVIEW OF SYSTEMS  [  ] ROS unobtainable because:    [x  ] All other systems negative except as noted below:	    Constitutional:  [ ] fever [ ] chills  [ ] weight loss  [ ] weakness  Skin:  [ ] rash [ ] phlebitis	  Eyes: [ ] icterus [ ] pain  [ ] discharge	  ENMT: [ ] sore throat  [ ] thrush [ ] ulcers [ ] exudates  Respiratory: [ ] dyspnea [ ] hemoptysis [ ] cough [ ] sputum	  Cardiovascular:  [ ] chest pain [ ] palpitations [ ] edema	  Gastrointestinal:  [ ] nausea [ ] vomiting [ ] diarrhea [ ] constipation [ ] pain	  Genitourinary:  [ ] dysuria [ ] frequency [ ] hematuria [ ] discharge [ ] flank pain  [ ] incontinence  Musculoskeletal:  [ ] myalgias [ ] arthralgias [ ] arthritis  [ ] back pain +L toe wound   Neurological:  [ ] headache [ ] seizures  [ ] confusion/altered mental status  Psychiatric:  [ ] anxiety [ ] depression	  Hematology/Lymphatics:  [ ] lymphadenopathy  Endocrine:  [ ] adrenal [ ] thyroid  Allergic/Immunologic:	 [ ] transplant [ ] seasonal    Vital Signs Last 24 Hrs  T(F): 98.4 (09-16-18 @ 12:09), Max: 98.5 (09-14-18 @ 06:06)    Vital Signs Last 24 Hrs  HR: 76 (09-16-18 @ 12:09) (69 - 83)  BP: 109/69 (09-16-18 @ 12:09) (100/66 - 123/73)  RR: 18 (09-16-18 @ 12:09)  SpO2: 98% (09-16-18 @ 12:09) (95% - 98%)  Wt(kg): --    PHYSICAL EXAM:  General: non-toxic  HEAD/EYES: anicteric, PERRL  ENT:  supple  Cardiovascular:   S1, S2  Respiratory:  clear bilaterally  GI:  soft, non-tender, normal bowel sounds  :  no CVA tenderness   Musculoskeletal:  no synovitis  Neurologic:  grossly non-focal  Skin:  no rash  Lymph: no lymphadenopathy  Psychiatric:  appropriate affect  Vascular:  no phlebitis                                14.6   5.85  )-----------( 261      ( 16 Sep 2018 09:15 )             43.3       09-16    138  |  102  |  20  ----------------------------<  215<H>  4.5   |  26  |  1.10    Ca    9.4      16 Sep 2018 07:16            MICROBIOLOGY:  .Abscess Wound  09-13-18   Moderate Corynebacterium species  "Susceptibilities not performed"  --  --    .Blood Blood  09-13-18   No growth to date.  --  --    Vancomycin Level, Trough: 12.8 ug/mL (09-15-18 @ 21:25)    RADIOLOGY:    EXAM:  MR FOOT WAW IC LT                        PROCEDURE DATE:  09/14/2018    Sinus tracts at the tip of the second digit with osteomyelitis throughout the second distal and middle phalanges and at the head of the second proximal phalanx. Marrow edema within the remainder of the second proximal phalanx is favored to be reactive at this time. Adjacent cellulitis and nonspecific myositis.    EXAM:  PHYSIOL EXTREM LOW 3+ LEV BI                        PROCEDURE DATE:  09/14/2018    Severe arterial occlusive disease of the left lower extremity at the level of metatarsals. Limited compressibility/noncompressibility of the arteries in the thigh through the ankles are suggestive of arterial medial calcification in the setting of diabetes mellitus.  No evidence of significant arterial occlusive disease of the right lower extremity at rest. Limited compressibility/noncompressibility of the arteries in the thigh through the calves are suggestive of arterial medial calcification in the setting of diabetes mellitus.    EXAM:  FOOT COMPLETE LEFT (MIN 3 VIEWS)                        PROCEDURE DATE:  09/13/2018    There is erosion of the second distal phalanx with only a portion of the base remaining, which is dorsally displaced. Findings are compatible with osteomyelitis.   Ulcer is noted at the tip of the second digit with surrounding soft tissue swelling. HPI:    68 year old male with PMH DM2 (A1C 7.1), CAD s/p stent on ASA/Plavix, HLD who presented to [facility] on 9/13 with left second toe wound of ~3 week duration. Patient noted being in [place] ~1 month ago and was admitted to the hospital for cellulitis at the same site and was treated with IV antibiotics and then transitioned to PO antibiotics. Patient is unsure of the name of either antibiotic. After return from [place] to the  he has been following with a Podiatrist who has been performing superficial debridements and wound care. No improvement in the wound despite this. He saw Podiatry ~1 week ago and was prescribed Amoxicillin. He followed up with Podiatry on the day of admission and given persistence of the wound was referred for admission.     In the ED afebrile, normotensive and not tachycardic. WBC on presentation of 7.0 without bands. ESR 16. CRP of 0.11. XR of L foot with erosion of the second distal phalanx with only a portion of the base remaining, which is dorsally displaced. Findings are compatible with osteomyelitis. Patient started on Vancomycin and Zosyn. MRI of L foot with sinus tracts at the tip of the second digit with osteomyelitis throughout the second distal and middle phalanges and at the head of the second proximal phalanx. Superficial wound cultures with Corynebacterium.     Patient denies pain - He reports intermittent parathesias on both feet. He had injured his foot about 15 years ago when he kicked a hard surface.    PAST MEDICAL & SURGICAL HISTORY:  HTN (hypertension)  HLD (hyperlipidemia)  Diabetes: Type 2, Controlled, Uncomplicated, A1c: 6.4 - currently 7.1  CAD (coronary artery disease)  History of cholecystectomy  S/P primary angioplasty with coronary stent: x 4 stents    Allergies  No Known Allergies    ANTIMICROBIALS:  piperacillin/tazobactam IVPB. 3.375 every 8 hours  vancomycin  IVPB 1000 every 12 hours      OTHER MEDS: MEDICATIONS  (STANDING):  aspirin enteric coated 81 daily  cilostazol 50 two times a day  clopidogrel Tablet 75 daily  dextrose 40% Gel 15 once PRN  dextrose 50% Injectable 12.5 once  dextrose 50% Injectable 25 once  dextrose 50% Injectable 25 once  glucagon  Injectable 1 once PRN  heparin  Injectable 5000 every 8 hours  influenza   Vaccine 0.5 once  insulin glargine Injectable (LANTUS) 10 at bedtime  insulin lispro (HumaLOG) corrective regimen sliding scale  three times a day before meals  insulin lispro Injectable (HumaLOG) 6 three times a day before meals  metoprolol succinate ER 25 daily  ranolazine 500 two times a day  simvastatin 80 at bedtime      SOCIAL HISTORY: Denies smoking or EtOH use. No recreational drug use. Born in [place] and moved to  in 1960's. Most recently travelled to [place] and [place] one month ago.     FAMILY HISTORY:  Family history of diabetes mellitus in brothers (Sibling)    REVIEW OF SYSTEMS  [  ] ROS unobtainable because:    [x  ] All other systems negative except as noted below:	    Constitutional:  [ ] fever [ ] chills  [ ] weight loss  [ ] weakness  Skin:  [ ] rash [ ] phlebitis	  Eyes: [ ] icterus [ ] pain  [ ] discharge	  ENMT: [ ] sore throat  [ ] thrush [ ] ulcers [ ] exudates  Respiratory: [ ] dyspnea [ ] hemoptysis [ ] cough [ ] sputum	  Cardiovascular:  [ ] chest pain [ ] palpitations [ ] edema	  Gastrointestinal:  [ ] nausea [ ] vomiting [ ] diarrhea [ ] constipation [ ] pain	  Genitourinary:  [ ] dysuria [ ] frequency [ ] hematuria [ ] discharge [ ] flank pain  [ ] incontinence  Musculoskeletal:  [ ] myalgias [ ] arthralgias [ ] arthritis  [ ] back pain +L toe wound   Neurological:  [ ] headache [ ] seizures  [ ] confusion/altered mental status  Psychiatric:  [ ] anxiety [ ] depression	  Hematology/Lymphatics:  [ ] lymphadenopathy  Endocrine:  [ ] adrenal [ ] thyroid  Allergic/Immunologic:	 [ ] transplant [ ] seasonal    Vital Signs Last 24 Hrs  T(F): 98.4 (09-16-18 @ 12:09), Max: 98.5 (09-14-18 @ 06:06)    Vital Signs Last 24 Hrs  HR: 76 (09-16-18 @ 12:09) (69 - 83)  BP: 109/69 (09-16-18 @ 12:09) (100/66 - 123/73)  RR: 18 (09-16-18 @ 12:09)  SpO2: 98% (09-16-18 @ 12:09) (95% - 98%)  Wt(kg): --    PHYSICAL EXAM:  General: non-toxic  HEAD/EYES: anicteric, PERRL  ENT:  supple  Cardiovascular:   S1, S2  Respiratory:  clear bilaterally  GI:  soft, non-tender, normal bowel sounds  :  no CVA tenderness   Musculoskeletal:  no synovitis  Neurologic:  grossly non-focal  Skin:  no rash  Lymph: no lymphadenopathy  Psychiatric:  appropriate affect  Vascular:  no phlebitis, no edema  ulceration left second toe with superficial eschar - Podiatry notes that ulcer probes to bone-  no purulence or proximal cellulitis               14.6   5.85  )-----------( 261      ( 16 Sep 2018 09:15 )             43.3   Sedimentation Rate, Erythrocyte (09.13.18 @ 14:31)    Sedimentation Rate, Erythrocyte: 16 mm/hr  C-Reactive Protein, Serum (09.13.18 @ 16:22)    C-Reactive Protein, Serum: 0.11 mg/dL      09-16    138  |  102  |  20  ----------------------------<  215<H>  4.5   |  26  |  1.10    Ca    9.4      16 Sep 2018 07:16    Hemoglobin A1C, Whole Blood (09.15.18 @ 08:27)    Hemoglobin A1C, Whole Blood: 7.1    MICROBIOLOGY:  .Abscess Wound  09-13-18   Moderate Corynebacterium species  "Susceptibilities not performed"  --  --    .Blood Blood  09-13-18   No growth to date.  --  --    Vancomycin Level, Trough: 12.8 ug/mL (09-15-18 @ 21:25)    RADIOLOGY:    EXAM:  MR FOOT WAW IC LT               I have viewed images of MRI scan        PROCEDURE DATE:  09/14/2018    Sinus tracts at the tip of the second digit with osteomyelitis throughout the second distal and middle phalanges and at the head of the second proximal phalanx. Marrow edema within the remainder of the second proximal phalanx is favored to be reactive at this time. Adjacent cellulitis and nonspecific myositis.    EXAM:  PHYSIOL EXTREM LOW 3+ LEV BI                        PROCEDURE DATE:  09/14/2018    Severe arterial occlusive disease of the left lower extremity at the level of metatarsals. Limited compressibility/noncompressibility of the arteries in the thigh through the ankles are suggestive of arterial medial calcification in the setting of diabetes mellitus.  No evidence of significant arterial occlusive disease of the right lower extremity at rest. Limited compressibility/noncompressibility of the arteries in the thigh through the calves are suggestive of arterial medial calcification in the setting of diabetes mellitus.    EXAM:  FOOT COMPLETE LEFT (MIN 3 VIEWS)                        PROCEDURE DATE:  09/13/2018    There is erosion of the second distal phalanx with only a portion of the base remaining, which is dorsally displaced. Findings are compatible with osteomyelitis.   Ulcer is noted at the tip of the second digit with surrounding soft tissue swelling. HPI:    68 year old male with PMH DM2 (A1C 7.1), CAD s/p stent on ASA/Plavix, HLD who presented to [facility] on 9/13 with left second toe wound of ~3 week duration. Patient noted being in [place] ~1 month ago and was admitted to the hospital for cellulitis at the same site and was treated with IV antibiotics and then transitioned to PO antibiotics. Patient is unsure of the name of either antibiotic. After return from [place] to the  he has been following with a Podiatrist who has been performing superficial debridements and wound care. No improvement in the wound despite this. He saw Podiatry ~1 week ago and was prescribed Amoxicillin. He followed up with Podiatry on the day of admission and given persistence of the wound was referred for admission.     In the ED afebrile, normotensive and not tachycardic. WBC on presentation of 7.0 without bands. ESR 16. CRP of 0.11. XR of L foot with erosion of the second distal phalanx with only a portion of the base remaining, which is dorsally displaced. Findings are compatible with osteomyelitis. Patient started on Vancomycin and Zosyn. MRI of L foot with sinus tracts at the tip of the second digit with osteomyelitis throughout the second distal and middle phalanges and at the head of the second proximal phalanx. Superficial wound cultures with Corynebacterium.     Patient denies pain - He reports intermittent parathesias on both feet. He had injured his foot about 15 years ago when he kicked a hard surface.    PAST MEDICAL & SURGICAL HISTORY:  HTN (hypertension)  HLD (hyperlipidemia)  Diabetes: Type 2, Controlled, Uncomplicated, A1c: 6.4 - currently 7.1  CAD (coronary artery disease)  History of cholecystectomy  S/P primary angioplasty with coronary stent: x 4 stents    Allergies  No Known Allergies    ANTIMICROBIALS:  piperacillin/tazobactam IVPB. 3.375 every 8 hours  vancomycin  IVPB 1000 every 12 hours      OTHER MEDS: MEDICATIONS  (STANDING):  aspirin enteric coated 81 daily  cilostazol 50 two times a day  clopidogrel Tablet 75 daily  dextrose 40% Gel 15 once PRN  dextrose 50% Injectable 12.5 once  dextrose 50% Injectable 25 once  dextrose 50% Injectable 25 once  glucagon  Injectable 1 once PRN  heparin  Injectable 5000 every 8 hours  influenza   Vaccine 0.5 once  insulin glargine Injectable (LANTUS) 10 at bedtime  insulin lispro (HumaLOG) corrective regimen sliding scale  three times a day before meals  insulin lispro Injectable (HumaLOG) 6 three times a day before meals  metoprolol succinate ER 25 daily  ranolazine 500 two times a day  simvastatin 80 at bedtime      SOCIAL HISTORY: Denies smoking or EtOH use. No recreational drug use. Born in [place] and moved to  in 1960's. Most recently travelled to [place] and [place] one month ago.     FAMILY HISTORY:  Family history of diabetes mellitus in brothers (Sibling)    REVIEW OF SYSTEMS  [  ] ROS unobtainable because:    [x  ] All other systems negative except as noted below:	    Constitutional:  [ ] fever [ ] chills  [ ] weight loss  [ ] weakness  Skin:  [ ] rash [ ] phlebitis	  Eyes: [ ] icterus [ ] pain  [ ] discharge	  ENMT: [ ] sore throat  [ ] thrush [ ] ulcers [ ] exudates  Respiratory: [ ] dyspnea [ ] hemoptysis [ ] cough [ ] sputum	  Cardiovascular:  [ ] chest pain [ ] palpitations [ ] edema	  Gastrointestinal:  [ ] nausea [ ] vomiting [ ] diarrhea [ ] constipation [ ] pain	  Genitourinary:  [ ] dysuria [ ] frequency [ ] hematuria [ ] discharge [ ] flank pain  [ ] incontinence  Musculoskeletal:  [ ] myalgias [ ] arthralgias [ ] arthritis  [ ] back pain +L toe wound   Neurological:  [ ] headache [ ] seizures  [ ] confusion/altered mental status  Psychiatric:  [ ] anxiety [ ] depression	  Hematology/Lymphatics:  [ ] lymphadenopathy  Endocrine:  [ ] adrenal [ ] thyroid  Allergic/Immunologic:	 [ ] transplant [ ] seasonal    Vital Signs Last 24 Hrs  T(F): 98.4 (09-16-18 @ 12:09), Max: 98.5 (09-14-18 @ 06:06)    Vital Signs Last 24 Hrs  HR: 76 (09-16-18 @ 12:09) (69 - 83)  BP: 109/69 (09-16-18 @ 12:09) (100/66 - 123/73)  RR: 18 (09-16-18 @ 12:09)  SpO2: 98% (09-16-18 @ 12:09) (95% - 98%)  Wt(kg): --    PHYSICAL EXAMINATION:  General: Alert and Awake, NAD  HEENT: PERRL, EOMI, No subconjunctival hemorrhages, Oropharynx Clear, MMM  Neck: Supple, No RAMÓN  Cardiac: RRR, No M/R/G  Resp: CTAB, No Wh/Rh/Ra  Abdomen: NBS, NT/ND, No HSM, No rigidity or guarding  MSK: LLE 2nd digit ulceration left second toe with superficial eschar - Podiatry notes that ulcer probes to bone-  No LE edema. No stigmata of IE. No evidence of phlebitis. No evidence of synovitis.  : No alexandra  Skin: No rashes or lesions. Skin is warm and dry to the touch.   Neuro: Alert and Awake. CN 2-12 Grossly intact. Moves all four extremities spontaneously.  Psych: Calm, Pleasant, Cooperative               14.6   5.85  )-----------( 261      ( 16 Sep 2018 09:15 )             43.3   Sedimentation Rate, Erythrocyte (09.13.18 @ 14:31)    Sedimentation Rate, Erythrocyte: 16 mm/hr  C-Reactive Protein, Serum (09.13.18 @ 16:22)    C-Reactive Protein, Serum: 0.11 mg/dL      09-16    138  |  102  |  20  ----------------------------<  215<H>  4.5   |  26  |  1.10    Ca    9.4      16 Sep 2018 07:16    Hemoglobin A1C, Whole Blood (09.15.18 @ 08:27)    Hemoglobin A1C, Whole Blood: 7.1    MICROBIOLOGY:  .Abscess Wound  09-13-18   Moderate Corynebacterium species  "Susceptibilities not performed"  --  --    .Blood Blood  09-13-18   No growth to date.  --  --    Vancomycin Level, Trough: 12.8 ug/mL (09-15-18 @ 21:25)    RADIOLOGY:    EXAM:  MR FOOT WAW IC LT               I have viewed images of MRI scan        PROCEDURE DATE:  09/14/2018    Sinus tracts at the tip of the second digit with osteomyelitis throughout the second distal and middle phalanges and at the head of the second proximal phalanx. Marrow edema within the remainder of the second proximal phalanx is favored to be reactive at this time. Adjacent cellulitis and nonspecific myositis.    EXAM:  PHYSIOL EXTREM LOW 3+ LEV BI                        PROCEDURE DATE:  09/14/2018    Severe arterial occlusive disease of the left lower extremity at the level of metatarsals. Limited compressibility/noncompressibility of the arteries in the thigh through the ankles are suggestive of arterial medial calcification in the setting of diabetes mellitus.  No evidence of significant arterial occlusive disease of the right lower extremity at rest. Limited compressibility/noncompressibility of the arteries in the thigh through the calves are suggestive of arterial medial calcification in the setting of diabetes mellitus.    EXAM:  FOOT COMPLETE LEFT (MIN 3 VIEWS)                        PROCEDURE DATE:  09/13/2018    There is erosion of the second distal phalanx with only a portion of the base remaining, which is dorsally displaced. Findings are compatible with osteomyelitis.   Ulcer is noted at the tip of the second digit with surrounding soft tissue swelling.

## 2018-09-16 NOTE — CONSULT NOTE ADULT - ASSESSMENT
68 year old male with PMH DM2 (A1C 7.1), CAD s/p stent on ASA/Plavix, HLD who presented to [facility] on 9/13 with left second toe wound of ~3 week duration.  In the ED afebrile, normotensive and not tachycardic. WBC on presentation of 7.0 without bands. ESR 16. CRP of 0.11. XR of L foot with erosion of the second distal phalanx with only a portion of the base remaining, which is dorsally displaced. Findings are compatible with osteomyelitis. Patient started on Vancomycin and Zosyn. MRI of L foot with sinus tracts at the tip of the second digit with osteomyelitis throughout the second distal and middle phalanges and at the head of the second proximal phalanx. Superficial wound cultures with Corynebacterium. 68 year old male with PMH DM2 (A1C 7.1), CAD s/p stent on ASA/Plavix, HLD who presented to [facility] on 9/13 with left second toe wound of ~3 week duration.  In the ED afebrile, normotensive and not tachycardic. WBC on presentation of 7.0 without bands. ESR 16. CRP of 0.11. XR of L foot with erosion of the second distal phalanx with only a portion of the base remaining, which is dorsally displaced. Findings are compatible with osteomyelitis. Patient started on Vancomycin and Zosyn. MRI of L foot with sinus tracts at the tip of the second digit with osteomyelitis throughout the second distal and middle phalanges and at the head of the second proximal phalanx. Superficial wound cultures with Corynebacterium.     Overall, 2nd digit OM on L Foot. Would continue vancomycin/zosyn. Corynebacterium on superficial Cx likely skin contaminant    --Continue Vancomycin 1g IV Q12H. Trough prior to fourth dose  --Continue Zosyn 3.375 mg IV Q8H  --F/U Angiography of LLE tomorrow  --F/U Podiatry recommendations

## 2018-09-16 NOTE — CONSULT NOTE ADULT - ATTENDING COMMENTS
Agree with above assessment and plan as outlined above.    - Cont periop BB  - Keep H/H >8/24, O2 sat > 92 % and MAP > 60 mmHg  - will follow with you    Jorden Anthony MD, FACC
68 year old male with PMH DM2 (A1C 7.1), CAD s/p stent on ASA/Plavix, HLD who presented to [facility] on 9/13 with left second toe wound of ~3 week duration.     LE angiography anticipated 9/17/18  toe amputation likely    Continue Jeremias Villatoroo  Please check Vanco trough level    discussed with covering NP
as above

## 2018-09-16 NOTE — PROGRESS NOTE ADULT - ASSESSMENT
68M with h/o  DMT2, HLD, CAD s/p stent on ASA/Plavix who presents  with c/o left second toe wound x 3 weeks. He denies LLE pain, fever/chills. Wound was managed by outpatient Podiatry with oral abx without improvement. Physical exam is notable for redness of left second toe with open bleeding wound, malodorous drainage on distal aspect. Labs are unremarkable. MRI of Left foot findings are consistent with osteomyelitis of left 2nd distal phalanx.

## 2018-09-16 NOTE — CHART NOTE - NSCHARTNOTEFT_GEN_A_CORE
Preop Dx: Left foot osteomyelitis   Surgeon: Dr. Gore  Procedure: Left leg angiogram, possible stent, possible angioplasty    Vital Signs Last 24 Hrs  T(C): 36.4 (16 Sep 2018 06:25), Max: 36.6 (15 Sep 2018 20:54)  T(F): 97.6 (16 Sep 2018 06:25), Max: 97.9 (15 Sep 2018 20:54)  HR: 69 (16 Sep 2018 06:25) (69 - 83)  BP: 123/73 (16 Sep 2018 06:25) (100/66 - 123/73)  BP(mean): --  RR: 18 (16 Sep 2018 06:25) (18 - 18)  SpO2: 95% (16 Sep 2018 06:25) (95% - 96%)                        14.6   5.85  )-----------( 261      ( 16 Sep 2018 09:15 )             43.3     09-16    138  |  102  |  20  ----------------------------<  215<H>  4.5   |  26  |  1.10    Ca    9.4      16 Sep 2018 07:16        Daily     Daily     EKG: ordered  CXR: ordered  Type and Screen #1: ordered    - Blood: Not needed.         Plan:9/16/18 for left leg angiogram, possible stent, possible angioplasty with Dr. Gore  - NPO after midnight except meds  - IVF while NPO  - Adjust Diabetic orders  for NPO period.  - Perioperative antibiotics not needed.  - Morning Labs: CBC, BMP, coags, type & screen ordered  - Medical clearance for OR    - Permits:  > Consent in chart.  > Case scheduled with OR.  > May continue VTE ppx perioperativley Preop Dx: Left foot osteomyelitis   Surgeon: Dr. Groe  Procedure: Left leg angiogram, possible stent, possible angioplasty    Vital Signs Last 24 Hrs  T(C): 36.4 (16 Sep 2018 06:25), Max: 36.6 (15 Sep 2018 20:54)  T(F): 97.6 (16 Sep 2018 06:25), Max: 97.9 (15 Sep 2018 20:54)  HR: 69 (16 Sep 2018 06:25) (69 - 83)  BP: 123/73 (16 Sep 2018 06:25) (100/66 - 123/73)  BP(mean): --  RR: 18 (16 Sep 2018 06:25) (18 - 18)  SpO2: 95% (16 Sep 2018 06:25) (95% - 96%)                        14.6   5.85  )-----------( 261      ( 16 Sep 2018 09:15 )             43.3     09-16    138  |  102  |  20  ----------------------------<  215<H>  4.5   |  26  |  1.10    Ca    9.4      16 Sep 2018 07:16        Daily     Daily     EKG: ordered  CXR: ordered  Type and Screen #1: ordered    - Blood: Not needed.         Plan:  - 9/17/18 for left leg angiogram, possible stent, possible angioplasty with Dr. Gore  - NPO after midnight except meds  - IVF while NPO  - Adjust Diabetic orders  for NPO period.  - Perioperative antibiotics not needed.  - Morning Labs: CBC, BMP, coags, type & screen ordered  - Medical clearance for OR    - Permits:  > Consent in chart.  > Case scheduled with OR.  > May continue VTE ppx perioperativley

## 2018-09-16 NOTE — PROGRESS NOTE ADULT - ASSESSMENT
ASSESSMENT: Patient is a 68M with L 2nd toe wound, planned for podiatry intervention. Vascular surgery consulted to evaluate blood flow to lower extremity.    PLAN:  -d/w pt lle angio indications risks and benefits  tent [facility] for monday   will follow

## 2018-09-16 NOTE — PROGRESS NOTE ADULT - PROBLEM SELECTOR PLAN 1
- c/w Zosyn/Vancomycin  - Podiatry recs appreciated - surgical intervention likely  - MRI reveals Sinus tracts at the tip of the second digit with osteomyelitis throughout the second distal and middle phalanges and at the head of the second proximal phalanx  - FABBY reveals Severe arterial occlusive disease of the left lower extremity at the   level of metatarsals  - Plan for Vascular angiogram tomorrow

## 2018-09-17 LAB
ANION GAP SERPL CALC-SCNC: 11 MMOL/L — SIGNIFICANT CHANGE UP (ref 5–17)
APTT BLD: 28.5 SEC — SIGNIFICANT CHANGE UP (ref 27.5–37.4)
BUN SERPL-MCNC: 22 MG/DL — SIGNIFICANT CHANGE UP (ref 7–23)
CALCIUM SERPL-MCNC: 10 MG/DL — SIGNIFICANT CHANGE UP (ref 8.4–10.5)
CHLORIDE SERPL-SCNC: 95 MMOL/L — LOW (ref 96–108)
CO2 SERPL-SCNC: 26 MMOL/L — SIGNIFICANT CHANGE UP (ref 22–31)
CREAT SERPL-MCNC: 1.26 MG/DL — SIGNIFICANT CHANGE UP (ref 0.5–1.3)
GLUCOSE BLDC GLUCOMTR-MCNC: 172 MG/DL — HIGH (ref 70–99)
GLUCOSE BLDC GLUCOMTR-MCNC: 178 MG/DL — HIGH (ref 70–99)
GLUCOSE BLDC GLUCOMTR-MCNC: 195 MG/DL — HIGH (ref 70–99)
GLUCOSE BLDC GLUCOMTR-MCNC: 200 MG/DL — HIGH (ref 70–99)
GLUCOSE SERPL-MCNC: 232 MG/DL — HIGH (ref 70–99)
HCT VFR BLD CALC: 44.9 % — SIGNIFICANT CHANGE UP (ref 39–50)
HGB BLD-MCNC: 15.6 G/DL — SIGNIFICANT CHANGE UP (ref 13–17)
INR BLD: 1.02 RATIO — SIGNIFICANT CHANGE UP (ref 0.88–1.16)
MCHC RBC-ENTMCNC: 30.6 PG — SIGNIFICANT CHANGE UP (ref 27–34)
MCHC RBC-ENTMCNC: 34.7 GM/DL — SIGNIFICANT CHANGE UP (ref 32–36)
MCV RBC AUTO: 88 FL — SIGNIFICANT CHANGE UP (ref 80–100)
PLATELET # BLD AUTO: 271 K/UL — SIGNIFICANT CHANGE UP (ref 150–400)
POTASSIUM SERPL-MCNC: 3.7 MMOL/L — SIGNIFICANT CHANGE UP (ref 3.5–5.3)
POTASSIUM SERPL-SCNC: 3.7 MMOL/L — SIGNIFICANT CHANGE UP (ref 3.5–5.3)
PROTHROM AB SERPL-ACNC: 11.5 SEC — SIGNIFICANT CHANGE UP (ref 10–13.1)
RBC # BLD: 5.1 M/UL — SIGNIFICANT CHANGE UP (ref 4.2–5.8)
RBC # FLD: 13.6 % — SIGNIFICANT CHANGE UP (ref 10.3–14.5)
SODIUM SERPL-SCNC: 132 MMOL/L — LOW (ref 135–145)
VANCOMYCIN FLD-MCNC: 14.4 UG/ML — SIGNIFICANT CHANGE UP
WBC # BLD: 5.63 K/UL — SIGNIFICANT CHANGE UP (ref 3.8–10.5)
WBC # FLD AUTO: 5.63 K/UL — SIGNIFICANT CHANGE UP (ref 3.8–10.5)

## 2018-09-17 PROCEDURE — 36245 INS CATH ABD/L-EXT ART 1ST: CPT | Mod: LT

## 2018-09-17 PROCEDURE — 75625 CONTRAST EXAM ABDOMINL AORTA: CPT | Mod: 26

## 2018-09-17 PROCEDURE — 75710 ARTERY X-RAYS ARM/LEG: CPT | Mod: 26,LT

## 2018-09-17 PROCEDURE — 99231 SBSQ HOSP IP/OBS SF/LOW 25: CPT

## 2018-09-17 PROCEDURE — 99233 SBSQ HOSP IP/OBS HIGH 50: CPT

## 2018-09-17 RX ORDER — ACETAMINOPHEN 500 MG
650 TABLET ORAL ONCE
Qty: 0 | Refills: 0 | Status: COMPLETED | OUTPATIENT
Start: 2018-09-17 | End: 2018-09-17

## 2018-09-17 RX ADMIN — Medication 1: at 09:13

## 2018-09-17 RX ADMIN — RANOLAZINE 500 MILLIGRAM(S): 500 TABLET, FILM COATED, EXTENDED RELEASE ORAL at 18:37

## 2018-09-17 RX ADMIN — HEPARIN SODIUM 5000 UNIT(S): 5000 INJECTION INTRAVENOUS; SUBCUTANEOUS at 22:38

## 2018-09-17 RX ADMIN — Medication 25 MILLIGRAM(S): at 06:19

## 2018-09-17 RX ADMIN — PIPERACILLIN AND TAZOBACTAM 25 GRAM(S): 4; .5 INJECTION, POWDER, LYOPHILIZED, FOR SOLUTION INTRAVENOUS at 02:08

## 2018-09-17 RX ADMIN — RANOLAZINE 500 MILLIGRAM(S): 500 TABLET, FILM COATED, EXTENDED RELEASE ORAL at 06:19

## 2018-09-17 RX ADMIN — Medication 250 MILLIGRAM(S): at 22:37

## 2018-09-17 RX ADMIN — Medication 1: at 12:45

## 2018-09-17 RX ADMIN — Medication 250 MILLIGRAM(S): at 09:25

## 2018-09-17 RX ADMIN — INSULIN GLARGINE 10 UNIT(S): 100 INJECTION, SOLUTION SUBCUTANEOUS at 22:37

## 2018-09-17 RX ADMIN — PIPERACILLIN AND TAZOBACTAM 25 GRAM(S): 4; .5 INJECTION, POWDER, LYOPHILIZED, FOR SOLUTION INTRAVENOUS at 09:43

## 2018-09-17 RX ADMIN — HEPARIN SODIUM 5000 UNIT(S): 5000 INJECTION INTRAVENOUS; SUBCUTANEOUS at 06:19

## 2018-09-17 RX ADMIN — Medication 650 MILLIGRAM(S): at 23:08

## 2018-09-17 RX ADMIN — Medication 1: at 18:13

## 2018-09-17 RX ADMIN — CLOPIDOGREL BISULFATE 75 MILLIGRAM(S): 75 TABLET, FILM COATED ORAL at 12:44

## 2018-09-17 RX ADMIN — CILOSTAZOL 50 MILLIGRAM(S): 100 TABLET ORAL at 12:44

## 2018-09-17 RX ADMIN — SIMVASTATIN 80 MILLIGRAM(S): 20 TABLET, FILM COATED ORAL at 22:38

## 2018-09-17 RX ADMIN — Medication 650 MILLIGRAM(S): at 22:38

## 2018-09-17 RX ADMIN — CILOSTAZOL 50 MILLIGRAM(S): 100 TABLET ORAL at 22:39

## 2018-09-17 RX ADMIN — Medication 81 MILLIGRAM(S): at 12:44

## 2018-09-17 RX ADMIN — PIPERACILLIN AND TAZOBACTAM 25 GRAM(S): 4; .5 INJECTION, POWDER, LYOPHILIZED, FOR SOLUTION INTRAVENOUS at 18:37

## 2018-09-17 NOTE — PROGRESS NOTE ADULT - ASSESSMENT
68 year old male with PMH DM2 (A1C 7.1), CAD s/p stent on ASA/Plavix, HLD who presented to [facility] on 9/13 with left second toe wound of ~3 week duration.  In the ED afebrile, normotensive and not tachycardic. WBC on presentation of 7.0 without bands. ESR 16. CRP of 0.11. XR of L foot with erosion of the second distal phalanx with only a portion of the base remaining, which is dorsally displaced. Findings are compatible with osteomyelitis. Patient started on Vancomycin and Zosyn. MRI of L foot with sinus tracts at the tip of the second digit with osteomyelitis throughout the second distal and middle phalanges and at the head of the second proximal phalanx. Superficial wound cultures with Corynebacterium.     2nd digit OM on L Foot    continue vancomycin/zosyn.   possible left 2nd toe amp

## 2018-09-17 NOTE — CHART NOTE - NSCHARTNOTEFT_GEN_A_CORE
Given results of angiogram, would recommend toe amputation (not TMA).  Will follow up results of vein mapping.    MARIN Small, PGY-2  Vascular Surgery  p. [phone]

## 2018-09-17 NOTE — PROGRESS NOTE ADULT - ASSESSMENT
A/P: 68M POD0 s/p dx LLE angio  - Given results of angiogram, would recommend toe amputation (not TMA).  - f/u vein mapping  - monitor R groin for hematoma  - monitor pedal pulses  - dvt ppx  - vanc/zosyn  - reg diet/NPOaMN  - pain control  - AM labs  - cont care as per primary team    [id number]

## 2018-09-17 NOTE — PROGRESS NOTE ADULT - PROBLEM SELECTOR PLAN 1
- c/w Zosyn/Vancomycin day 4  - Podiatry recs appreciated - surgical intervention likely  - MRI reveals Sinus tracts at the tip of the second digit with osteomyelitis throughout the second distal and middle phalanges and at the head of the second proximal phalanx  - FABBY reveals Severe arterial occlusive disease of the left lower extremity at the   level of metatarsals  - Plan for Vascular angiogram tomorrow  - Following angiogram podiatry follow up for surgical intervention   - Will defer medical optimization/ risk stratification to cardiology - c/w Zosyn/Vancomycin day 4  - Podiatry recs appreciated - surgical intervention likely  - MRI reveals Sinus tracts at the tip of the second digit with osteomyelitis throughout the second distal and middle phalanges and at the head of the second proximal phalanx  - FABBY reveals Severe arterial occlusive disease of the left lower extremity at the   level of metatarsals  - Plan for Vascular angiogram 9/17  - Following angiogram podiatry follow up for surgical intervention   - Will defer medical optimization/ risk stratification to cardiology

## 2018-09-17 NOTE — PROGRESS NOTE ADULT - ASSESSMENT
68 year old male LEFT foot send digit ulceration, down to bone  - Pt was examined and evaluated   - WBC 7 ESR 16 CRP 0.11   - (+) Xray for OM, probing to bone wound --> pod surg planning digital vs. P2RR  -  Appreciate Card's risk stratification and clearance.   -Angio today, planning OR after results likely Tuesday or Wednesday  - D/w attending

## 2018-09-18 ENCOUNTER — TRANSCRIPTION ENCOUNTER (OUTPATIENT)
Age: 68
End: 2018-09-18

## 2018-09-18 LAB
ANION GAP SERPL CALC-SCNC: 10 MMOL/L — SIGNIFICANT CHANGE UP (ref 5–17)
APPEARANCE UR: CLEAR — SIGNIFICANT CHANGE UP
BILIRUB UR-MCNC: NEGATIVE — SIGNIFICANT CHANGE UP
BUN SERPL-MCNC: 20 MG/DL — SIGNIFICANT CHANGE UP (ref 7–23)
CALCIUM SERPL-MCNC: 9.6 MG/DL — SIGNIFICANT CHANGE UP (ref 8.4–10.5)
CHLORIDE SERPL-SCNC: 100 MMOL/L — SIGNIFICANT CHANGE UP (ref 96–108)
CO2 SERPL-SCNC: 26 MMOL/L — SIGNIFICANT CHANGE UP (ref 22–31)
COLOR SPEC: YELLOW — SIGNIFICANT CHANGE UP
CREAT SERPL-MCNC: 1.22 MG/DL — SIGNIFICANT CHANGE UP (ref 0.5–1.3)
CULTURE RESULTS: SIGNIFICANT CHANGE UP
DIFF PNL FLD: NEGATIVE — SIGNIFICANT CHANGE UP
GLUCOSE BLDC GLUCOMTR-MCNC: 158 MG/DL — HIGH (ref 70–99)
GLUCOSE BLDC GLUCOMTR-MCNC: 213 MG/DL — HIGH (ref 70–99)
GLUCOSE BLDC GLUCOMTR-MCNC: 219 MG/DL — HIGH (ref 70–99)
GLUCOSE BLDC GLUCOMTR-MCNC: 252 MG/DL — HIGH (ref 70–99)
GLUCOSE BLDC GLUCOMTR-MCNC: 286 MG/DL — HIGH (ref 70–99)
GLUCOSE SERPL-MCNC: 218 MG/DL — HIGH (ref 70–99)
GLUCOSE UR QL: 1000 MG/DL
HCT VFR BLD CALC: 43.6 % — SIGNIFICANT CHANGE UP (ref 39–50)
HGB BLD-MCNC: 14.8 G/DL — SIGNIFICANT CHANGE UP (ref 13–17)
KETONES UR-MCNC: ABNORMAL
LEUKOCYTE ESTERASE UR-ACNC: NEGATIVE — SIGNIFICANT CHANGE UP
MAGNESIUM SERPL-MCNC: 2 MG/DL — SIGNIFICANT CHANGE UP (ref 1.6–2.6)
MCHC RBC-ENTMCNC: 30.2 PG — SIGNIFICANT CHANGE UP (ref 27–34)
MCHC RBC-ENTMCNC: 33.9 GM/DL — SIGNIFICANT CHANGE UP (ref 32–36)
MCV RBC AUTO: 89 FL — SIGNIFICANT CHANGE UP (ref 80–100)
NITRITE UR-MCNC: NEGATIVE — SIGNIFICANT CHANGE UP
PH UR: 6 — SIGNIFICANT CHANGE UP (ref 5–8)
PHOSPHATE SERPL-MCNC: 2.8 MG/DL — SIGNIFICANT CHANGE UP (ref 2.5–4.5)
PLATELET # BLD AUTO: 239 K/UL — SIGNIFICANT CHANGE UP (ref 150–400)
POTASSIUM SERPL-MCNC: 4.4 MMOL/L — SIGNIFICANT CHANGE UP (ref 3.5–5.3)
POTASSIUM SERPL-SCNC: 4.4 MMOL/L — SIGNIFICANT CHANGE UP (ref 3.5–5.3)
PROT UR-MCNC: NEGATIVE MG/DL — SIGNIFICANT CHANGE UP
RBC # BLD: 4.9 M/UL — SIGNIFICANT CHANGE UP (ref 4.2–5.8)
RBC # FLD: 13.3 % — SIGNIFICANT CHANGE UP (ref 10.3–14.5)
SODIUM SERPL-SCNC: 136 MMOL/L — SIGNIFICANT CHANGE UP (ref 135–145)
SP GR SPEC: 1.02 — SIGNIFICANT CHANGE UP (ref 1.01–1.02)
SPECIMEN SOURCE: SIGNIFICANT CHANGE UP
UROBILINOGEN FLD QL: NEGATIVE MG/DL — SIGNIFICANT CHANGE UP
WBC # BLD: 4.67 K/UL — SIGNIFICANT CHANGE UP (ref 3.8–10.5)
WBC # FLD AUTO: 4.67 K/UL — SIGNIFICANT CHANGE UP (ref 3.8–10.5)

## 2018-09-18 PROCEDURE — 99232 SBSQ HOSP IP/OBS MODERATE 35: CPT

## 2018-09-18 PROCEDURE — 99233 SBSQ HOSP IP/OBS HIGH 50: CPT

## 2018-09-18 PROCEDURE — 93970 EXTREMITY STUDY: CPT | Mod: 26,59

## 2018-09-18 PROCEDURE — 93306 TTE W/DOPPLER COMPLETE: CPT | Mod: 26

## 2018-09-18 RX ORDER — INSULIN GLARGINE 100 [IU]/ML
12 INJECTION, SOLUTION SUBCUTANEOUS AT BEDTIME
Qty: 0 | Refills: 0 | Status: DISCONTINUED | OUTPATIENT
Start: 2018-09-18 | End: 2018-09-19

## 2018-09-18 RX ORDER — SENNA PLUS 8.6 MG/1
2 TABLET ORAL AT BEDTIME
Qty: 0 | Refills: 0 | Status: DISCONTINUED | OUTPATIENT
Start: 2018-09-18 | End: 2018-09-19

## 2018-09-18 RX ORDER — DOCUSATE SODIUM 100 MG
100 CAPSULE ORAL
Qty: 0 | Refills: 0 | Status: DISCONTINUED | OUTPATIENT
Start: 2018-09-18 | End: 2018-09-19

## 2018-09-18 RX ORDER — LACTOBACILLUS ACIDOPHILUS 100MM CELL
1 CAPSULE ORAL
Qty: 0 | Refills: 0 | Status: DISCONTINUED | OUTPATIENT
Start: 2018-09-18 | End: 2018-09-19

## 2018-09-18 RX ORDER — SODIUM CHLORIDE 9 MG/ML
1000 INJECTION INTRAMUSCULAR; INTRAVENOUS; SUBCUTANEOUS
Qty: 0 | Refills: 0 | Status: DISCONTINUED | OUTPATIENT
Start: 2018-09-18 | End: 2018-09-19

## 2018-09-18 RX ORDER — POLYETHYLENE GLYCOL 3350 17 G/17G
17 POWDER, FOR SOLUTION ORAL DAILY
Qty: 0 | Refills: 0 | Status: DISCONTINUED | OUTPATIENT
Start: 2018-09-18 | End: 2018-09-19

## 2018-09-18 RX ADMIN — Medication 100 MILLIGRAM(S): at 17:51

## 2018-09-18 RX ADMIN — SENNA PLUS 2 TABLET(S): 8.6 TABLET ORAL at 22:41

## 2018-09-18 RX ADMIN — Medication 3: at 13:33

## 2018-09-18 RX ADMIN — HEPARIN SODIUM 5000 UNIT(S): 5000 INJECTION INTRAVENOUS; SUBCUTANEOUS at 06:10

## 2018-09-18 RX ADMIN — Medication 1 TABLET(S): at 12:10

## 2018-09-18 RX ADMIN — Medication 81 MILLIGRAM(S): at 12:10

## 2018-09-18 RX ADMIN — RANOLAZINE 500 MILLIGRAM(S): 500 TABLET, FILM COATED, EXTENDED RELEASE ORAL at 17:13

## 2018-09-18 RX ADMIN — HEPARIN SODIUM 5000 UNIT(S): 5000 INJECTION INTRAVENOUS; SUBCUTANEOUS at 22:43

## 2018-09-18 RX ADMIN — Medication 250 MILLIGRAM(S): at 23:43

## 2018-09-18 RX ADMIN — Medication 3: at 17:17

## 2018-09-18 RX ADMIN — CILOSTAZOL 50 MILLIGRAM(S): 100 TABLET ORAL at 09:46

## 2018-09-18 RX ADMIN — Medication 6 UNIT(S): at 17:17

## 2018-09-18 RX ADMIN — CLOPIDOGREL BISULFATE 75 MILLIGRAM(S): 75 TABLET, FILM COATED ORAL at 13:15

## 2018-09-18 RX ADMIN — INSULIN GLARGINE 12 UNIT(S): 100 INJECTION, SOLUTION SUBCUTANEOUS at 22:42

## 2018-09-18 RX ADMIN — PIPERACILLIN AND TAZOBACTAM 25 GRAM(S): 4; .5 INJECTION, POWDER, LYOPHILIZED, FOR SOLUTION INTRAVENOUS at 01:42

## 2018-09-18 RX ADMIN — Medication 1 TABLET(S): at 17:24

## 2018-09-18 RX ADMIN — CILOSTAZOL 50 MILLIGRAM(S): 100 TABLET ORAL at 22:42

## 2018-09-18 RX ADMIN — Medication 25 MILLIGRAM(S): at 06:10

## 2018-09-18 RX ADMIN — HEPARIN SODIUM 5000 UNIT(S): 5000 INJECTION INTRAVENOUS; SUBCUTANEOUS at 13:40

## 2018-09-18 RX ADMIN — PIPERACILLIN AND TAZOBACTAM 25 GRAM(S): 4; .5 INJECTION, POWDER, LYOPHILIZED, FOR SOLUTION INTRAVENOUS at 19:40

## 2018-09-18 RX ADMIN — PIPERACILLIN AND TAZOBACTAM 25 GRAM(S): 4; .5 INJECTION, POWDER, LYOPHILIZED, FOR SOLUTION INTRAVENOUS at 12:10

## 2018-09-18 RX ADMIN — Medication 250 MILLIGRAM(S): at 09:59

## 2018-09-18 RX ADMIN — POLYETHYLENE GLYCOL 3350 17 GRAM(S): 17 POWDER, FOR SOLUTION ORAL at 13:40

## 2018-09-18 RX ADMIN — RANOLAZINE 500 MILLIGRAM(S): 500 TABLET, FILM COATED, EXTENDED RELEASE ORAL at 06:10

## 2018-09-18 RX ADMIN — Medication 6 UNIT(S): at 13:33

## 2018-09-18 RX ADMIN — SIMVASTATIN 80 MILLIGRAM(S): 20 TABLET, FILM COATED ORAL at 22:42

## 2018-09-18 NOTE — PROGRESS NOTE ADULT - PROBLEM SELECTOR PLAN 1
- c/w Zosyn/Vancomycin day 5  - Podiatry recs appreciated - surgical intervention likely  - MRI reveals Sinus tracts at the tip of the second digit with osteomyelitis throughout the second distal and middle phalanges and at the head of the second proximal phalanx  - FABBY reveals Severe arterial occlusive disease of the left lower extremity at the   level of metatarsals  - Pt s/p Vascular angiogram 9/17  - Plan for left 2nd digit resection per podiatry 9/19   - Appreciate cardiac risk stratification

## 2018-09-18 NOTE — PROGRESS NOTE ADULT - ASSESSMENT
68 year old male LEFT foot send digit ulceration, down to bone  - Pt was examined and evaluated   - angiogram 9/17, vascular recommending toe amputation over TMA   - awaiting vein mapping per vascular   - (+) Xray and MRI for OM, probing to bone wound  - Patient is going to OR tomorrow for left partial 2nd ray resection 10:30am  - patient s consented and cards cleared   - NPO after midnight tonight   - D/w attending 68 year old male LEFT foot send digit ulceration, down to bone  - Pt was examined and evaluated   - angiogram 9/17, vascular recommending toe amputation over TMA   - awaiting vein mapping per vascular   - (+) Xray and MRI for OM, probing to bone wound  - Patient is going to OR tomorrow for left partial 2nd ray resection 7:30am  - patient s consented and cards cleared   - NPO after midnight tonight   - D/w attending

## 2018-09-18 NOTE — PROVIDER CONTACT NOTE (OTHER) - ASSESSMENT
VSS, patient states pain in RLE feels constant throbbing pain.  RLE warm to touch color WDL, LLE Warm to touch color WDL, denies any numbness or tingling.   Right Groin dressing dry intact, no s/sx of hematoma noted.  +Right pedal pulse, +Left pedal pulse.

## 2018-09-18 NOTE — PROGRESS NOTE ADULT - ASSESSMENT
ASSESSMENT: Patient is a 68M with L 2nd toe wound, planned for podiatry intervention. Vascular surgery consulted to evaluate blood flow to lower extremity.    PLAN:  -pt for lt toe 32 amp by podiatry marla  will monitor wound healing progess  will follow

## 2018-09-18 NOTE — PROGRESS NOTE ADULT - ASSESSMENT
68 year old male with PMH DM2 (A1C 7.1), CAD s/p stent on ASA/Plavix, HLD who presented to [facility] on 9/13 with left second toe wound of ~3 week duration.  In the ED afebrile, normotensive and not tachycardic. WBC on presentation of 7.0 without bands. ESR 16. CRP of 0.11. XR of L foot with erosion of the second distal phalanx with only a portion of the base remaining, which is dorsally displaced. Findings are compatible with osteomyelitis. Patient started on Vancomycin and Zosyn. MRI of L foot with sinus tracts at the tip of the second digit with osteomyelitis throughout the second distal and middle phalanges and at the head of the second proximal phalanx. Superficial wound cultures with Corynebacterium.     2nd digit OM on L Foot  It is very likely that all infected bone will be surgically excised and antibiotics will be able to be discontinued 2 - 3days post op.    continue vancomycin/zosyn.    left 2nd toe amputation  in am    I will be out 9/19; Please call ID

## 2018-09-18 NOTE — PROVIDER CONTACT NOTE (OTHER) - BACKGROUND
69 y/o male admitted for Osteomyelitis of left foot.  s/p angiogram, surgical incision to Right groin with pressure dressing in place.

## 2018-09-19 ENCOUNTER — RESULT REVIEW (OUTPATIENT)
Age: 68
End: 2018-09-19

## 2018-09-19 ENCOUNTER — TRANSCRIPTION ENCOUNTER (OUTPATIENT)
Age: 68
End: 2018-09-19

## 2018-09-19 LAB
ANION GAP SERPL CALC-SCNC: 13 MMOL/L — SIGNIFICANT CHANGE UP (ref 5–17)
APTT BLD: 27.1 SEC — LOW (ref 27.5–37.4)
BASOPHILS # BLD AUTO: 0.02 K/UL — SIGNIFICANT CHANGE UP (ref 0–0.2)
BASOPHILS NFR BLD AUTO: 0.5 % — SIGNIFICANT CHANGE UP (ref 0–2)
BLD GP AB SCN SERPL QL: NEGATIVE — SIGNIFICANT CHANGE UP
BUN SERPL-MCNC: 19 MG/DL — SIGNIFICANT CHANGE UP (ref 7–23)
CALCIUM SERPL-MCNC: 9.1 MG/DL — SIGNIFICANT CHANGE UP (ref 8.4–10.5)
CHLORIDE SERPL-SCNC: 101 MMOL/L — SIGNIFICANT CHANGE UP (ref 96–108)
CO2 SERPL-SCNC: 24 MMOL/L — SIGNIFICANT CHANGE UP (ref 22–31)
CREAT SERPL-MCNC: 1.08 MG/DL — SIGNIFICANT CHANGE UP (ref 0.5–1.3)
EOSINOPHIL # BLD AUTO: 0.15 K/UL — SIGNIFICANT CHANGE UP (ref 0–0.5)
EOSINOPHIL NFR BLD AUTO: 3.7 % — SIGNIFICANT CHANGE UP (ref 0–6)
GLUCOSE BLDC GLUCOMTR-MCNC: 150 MG/DL — HIGH (ref 70–99)
GLUCOSE BLDC GLUCOMTR-MCNC: 170 MG/DL — HIGH (ref 70–99)
GLUCOSE BLDC GLUCOMTR-MCNC: 185 MG/DL — HIGH (ref 70–99)
GLUCOSE BLDC GLUCOMTR-MCNC: 188 MG/DL — HIGH (ref 70–99)
GLUCOSE SERPL-MCNC: 184 MG/DL — HIGH (ref 70–99)
GRAM STN FLD: SIGNIFICANT CHANGE UP
HCT VFR BLD CALC: 40.8 % — SIGNIFICANT CHANGE UP (ref 39–50)
HGB BLD-MCNC: 13.7 G/DL — SIGNIFICANT CHANGE UP (ref 13–17)
IMM GRANULOCYTES NFR BLD AUTO: 0.5 % — SIGNIFICANT CHANGE UP (ref 0–1.5)
INR BLD: 1.11 RATIO — SIGNIFICANT CHANGE UP (ref 0.88–1.16)
LYMPHOCYTES # BLD AUTO: 1.01 K/UL — SIGNIFICANT CHANGE UP (ref 1–3.3)
LYMPHOCYTES # BLD AUTO: 24.8 % — SIGNIFICANT CHANGE UP (ref 13–44)
MCHC RBC-ENTMCNC: 29.8 PG — SIGNIFICANT CHANGE UP (ref 27–34)
MCHC RBC-ENTMCNC: 33.6 GM/DL — SIGNIFICANT CHANGE UP (ref 32–36)
MCV RBC AUTO: 88.7 FL — SIGNIFICANT CHANGE UP (ref 80–100)
MONOCYTES # BLD AUTO: 0.37 K/UL — SIGNIFICANT CHANGE UP (ref 0–0.9)
MONOCYTES NFR BLD AUTO: 9.1 % — SIGNIFICANT CHANGE UP (ref 2–14)
NEUTROPHILS # BLD AUTO: 2.5 K/UL — SIGNIFICANT CHANGE UP (ref 1.8–7.4)
NEUTROPHILS NFR BLD AUTO: 61.4 % — SIGNIFICANT CHANGE UP (ref 43–77)
PLATELET # BLD AUTO: 207 K/UL — SIGNIFICANT CHANGE UP (ref 150–400)
POTASSIUM SERPL-MCNC: 4.3 MMOL/L — SIGNIFICANT CHANGE UP (ref 3.5–5.3)
POTASSIUM SERPL-SCNC: 4.3 MMOL/L — SIGNIFICANT CHANGE UP (ref 3.5–5.3)
PROTHROM AB SERPL-ACNC: 12.6 SEC — SIGNIFICANT CHANGE UP (ref 10–13.1)
RBC # BLD: 4.6 M/UL — SIGNIFICANT CHANGE UP (ref 4.2–5.8)
RBC # FLD: 13.4 % — SIGNIFICANT CHANGE UP (ref 10.3–14.5)
RH IG SCN BLD-IMP: NEGATIVE — SIGNIFICANT CHANGE UP
SODIUM SERPL-SCNC: 138 MMOL/L — SIGNIFICANT CHANGE UP (ref 135–145)
SPECIMEN SOURCE: SIGNIFICANT CHANGE UP
VANCOMYCIN TROUGH SERPL-MCNC: 10.2 UG/ML — SIGNIFICANT CHANGE UP (ref 10–20)
WBC # BLD: 4.07 K/UL — SIGNIFICANT CHANGE UP (ref 3.8–10.5)
WBC # FLD AUTO: 4.07 K/UL — SIGNIFICANT CHANGE UP (ref 3.8–10.5)

## 2018-09-19 PROCEDURE — 88311 DECALCIFY TISSUE: CPT | Mod: 26

## 2018-09-19 PROCEDURE — 99232 SBSQ HOSP IP/OBS MODERATE 35: CPT

## 2018-09-19 PROCEDURE — 99233 SBSQ HOSP IP/OBS HIGH 50: CPT

## 2018-09-19 PROCEDURE — 88305 TISSUE EXAM BY PATHOLOGIST: CPT | Mod: 26

## 2018-09-19 PROCEDURE — 73630 X-RAY EXAM OF FOOT: CPT | Mod: 26,LT

## 2018-09-19 RX ORDER — METOPROLOL TARTRATE 50 MG
25 TABLET ORAL DAILY
Qty: 0 | Refills: 0 | Status: DISCONTINUED | OUTPATIENT
Start: 2018-09-19 | End: 2018-09-21

## 2018-09-19 RX ORDER — RANOLAZINE 500 MG/1
500 TABLET, FILM COATED, EXTENDED RELEASE ORAL
Qty: 0 | Refills: 0 | Status: DISCONTINUED | OUTPATIENT
Start: 2018-09-19 | End: 2018-09-21

## 2018-09-19 RX ORDER — CILOSTAZOL 100 MG/1
50 TABLET ORAL
Qty: 0 | Refills: 0 | Status: DISCONTINUED | OUTPATIENT
Start: 2018-09-19 | End: 2018-09-21

## 2018-09-19 RX ORDER — SODIUM CHLORIDE 9 MG/ML
1000 INJECTION INTRAMUSCULAR; INTRAVENOUS; SUBCUTANEOUS
Qty: 0 | Refills: 0 | Status: DISCONTINUED | OUTPATIENT
Start: 2018-09-19 | End: 2018-09-20

## 2018-09-19 RX ORDER — LACTOBACILLUS ACIDOPHILUS 100MM CELL
1 CAPSULE ORAL
Qty: 0 | Refills: 0 | Status: DISCONTINUED | OUTPATIENT
Start: 2018-09-19 | End: 2018-09-21

## 2018-09-19 RX ORDER — POLYETHYLENE GLYCOL 3350 17 G/17G
17 POWDER, FOR SOLUTION ORAL DAILY
Qty: 0 | Refills: 0 | Status: DISCONTINUED | OUTPATIENT
Start: 2018-09-19 | End: 2018-09-21

## 2018-09-19 RX ORDER — INSULIN LISPRO 100/ML
6 VIAL (ML) SUBCUTANEOUS
Qty: 0 | Refills: 0 | Status: DISCONTINUED | OUTPATIENT
Start: 2018-09-19 | End: 2018-09-21

## 2018-09-19 RX ORDER — DEXTROSE 50 % IN WATER 50 %
25 SYRINGE (ML) INTRAVENOUS ONCE
Qty: 0 | Refills: 0 | Status: DISCONTINUED | OUTPATIENT
Start: 2018-09-19 | End: 2018-09-21

## 2018-09-19 RX ORDER — INSULIN LISPRO 100/ML
VIAL (ML) SUBCUTANEOUS
Qty: 0 | Refills: 0 | Status: DISCONTINUED | OUTPATIENT
Start: 2018-09-19 | End: 2018-09-21

## 2018-09-19 RX ORDER — SENNA PLUS 8.6 MG/1
2 TABLET ORAL AT BEDTIME
Qty: 0 | Refills: 0 | Status: DISCONTINUED | OUTPATIENT
Start: 2018-09-19 | End: 2018-09-21

## 2018-09-19 RX ORDER — ACETAMINOPHEN 500 MG
650 TABLET ORAL EVERY 6 HOURS
Qty: 0 | Refills: 0 | Status: DISCONTINUED | OUTPATIENT
Start: 2018-09-19 | End: 2018-09-21

## 2018-09-19 RX ORDER — HEPARIN SODIUM 5000 [USP'U]/ML
5000 INJECTION INTRAVENOUS; SUBCUTANEOUS EVERY 8 HOURS
Qty: 0 | Refills: 0 | Status: DISCONTINUED | OUTPATIENT
Start: 2018-09-19 | End: 2018-09-21

## 2018-09-19 RX ORDER — OXYCODONE AND ACETAMINOPHEN 5; 325 MG/1; MG/1
1 TABLET ORAL EVERY 6 HOURS
Qty: 0 | Refills: 0 | Status: DISCONTINUED | OUTPATIENT
Start: 2018-09-19 | End: 2018-09-20

## 2018-09-19 RX ORDER — GLUCAGON INJECTION, SOLUTION 0.5 MG/.1ML
1 INJECTION, SOLUTION SUBCUTANEOUS ONCE
Qty: 0 | Refills: 0 | Status: DISCONTINUED | OUTPATIENT
Start: 2018-09-19 | End: 2018-09-21

## 2018-09-19 RX ORDER — VANCOMYCIN HCL 1 G
1000 VIAL (EA) INTRAVENOUS EVERY 12 HOURS
Qty: 0 | Refills: 0 | Status: DISCONTINUED | OUTPATIENT
Start: 2018-09-19 | End: 2018-09-21

## 2018-09-19 RX ORDER — DEXTROSE 50 % IN WATER 50 %
12.5 SYRINGE (ML) INTRAVENOUS ONCE
Qty: 0 | Refills: 0 | Status: DISCONTINUED | OUTPATIENT
Start: 2018-09-19 | End: 2018-09-21

## 2018-09-19 RX ORDER — PIPERACILLIN AND TAZOBACTAM 4; .5 G/20ML; G/20ML
3.38 INJECTION, POWDER, LYOPHILIZED, FOR SOLUTION INTRAVENOUS EVERY 8 HOURS
Qty: 0 | Refills: 0 | Status: DISCONTINUED | OUTPATIENT
Start: 2018-09-19 | End: 2018-09-21

## 2018-09-19 RX ORDER — SIMVASTATIN 20 MG/1
80 TABLET, FILM COATED ORAL AT BEDTIME
Qty: 0 | Refills: 0 | Status: DISCONTINUED | OUTPATIENT
Start: 2018-09-19 | End: 2018-09-21

## 2018-09-19 RX ORDER — ASPIRIN/CALCIUM CARB/MAGNESIUM 324 MG
81 TABLET ORAL DAILY
Qty: 0 | Refills: 0 | Status: DISCONTINUED | OUTPATIENT
Start: 2018-09-19 | End: 2018-09-21

## 2018-09-19 RX ORDER — SODIUM CHLORIDE 9 MG/ML
1000 INJECTION, SOLUTION INTRAVENOUS
Qty: 0 | Refills: 0 | Status: DISCONTINUED | OUTPATIENT
Start: 2018-09-19 | End: 2018-09-21

## 2018-09-19 RX ORDER — INSULIN GLARGINE 100 [IU]/ML
12 INJECTION, SOLUTION SUBCUTANEOUS AT BEDTIME
Qty: 0 | Refills: 0 | Status: DISCONTINUED | OUTPATIENT
Start: 2018-09-19 | End: 2018-09-21

## 2018-09-19 RX ORDER — DOCUSATE SODIUM 100 MG
100 CAPSULE ORAL
Qty: 0 | Refills: 0 | Status: DISCONTINUED | OUTPATIENT
Start: 2018-09-19 | End: 2018-09-21

## 2018-09-19 RX ORDER — DEXTROSE 50 % IN WATER 50 %
15 SYRINGE (ML) INTRAVENOUS ONCE
Qty: 0 | Refills: 0 | Status: DISCONTINUED | OUTPATIENT
Start: 2018-09-19 | End: 2018-09-21

## 2018-09-19 RX ORDER — MORPHINE SULFATE 50 MG/1
2 CAPSULE, EXTENDED RELEASE ORAL EVERY 4 HOURS
Qty: 0 | Refills: 0 | Status: DISCONTINUED | OUTPATIENT
Start: 2018-09-19 | End: 2018-09-19

## 2018-09-19 RX ORDER — CLOPIDOGREL BISULFATE 75 MG/1
75 TABLET, FILM COATED ORAL DAILY
Qty: 0 | Refills: 0 | Status: DISCONTINUED | OUTPATIENT
Start: 2018-09-19 | End: 2018-09-21

## 2018-09-19 RX ADMIN — CILOSTAZOL 50 MILLIGRAM(S): 100 TABLET ORAL at 17:40

## 2018-09-19 RX ADMIN — PIPERACILLIN AND TAZOBACTAM 25 GRAM(S): 4; .5 INJECTION, POWDER, LYOPHILIZED, FOR SOLUTION INTRAVENOUS at 23:44

## 2018-09-19 RX ADMIN — HEPARIN SODIUM 5000 UNIT(S): 5000 INJECTION INTRAVENOUS; SUBCUTANEOUS at 14:23

## 2018-09-19 RX ADMIN — Medication 1 TABLET(S): at 12:51

## 2018-09-19 RX ADMIN — Medication 1: at 17:41

## 2018-09-19 RX ADMIN — Medication 6 UNIT(S): at 12:55

## 2018-09-19 RX ADMIN — CLOPIDOGREL BISULFATE 75 MILLIGRAM(S): 75 TABLET, FILM COATED ORAL at 12:51

## 2018-09-19 RX ADMIN — SODIUM CHLORIDE 50 MILLILITER(S): 9 INJECTION INTRAMUSCULAR; INTRAVENOUS; SUBCUTANEOUS at 21:03

## 2018-09-19 RX ADMIN — PIPERACILLIN AND TAZOBACTAM 25 GRAM(S): 4; .5 INJECTION, POWDER, LYOPHILIZED, FOR SOLUTION INTRAVENOUS at 02:56

## 2018-09-19 RX ADMIN — Medication 1 TABLET(S): at 17:40

## 2018-09-19 RX ADMIN — OXYCODONE AND ACETAMINOPHEN 1 TABLET(S): 5; 325 TABLET ORAL at 21:32

## 2018-09-19 RX ADMIN — Medication 6 UNIT(S): at 17:40

## 2018-09-19 RX ADMIN — SENNA PLUS 2 TABLET(S): 8.6 TABLET ORAL at 21:02

## 2018-09-19 RX ADMIN — Medication 100 MILLIGRAM(S): at 17:40

## 2018-09-19 RX ADMIN — INSULIN GLARGINE 12 UNIT(S): 100 INJECTION, SOLUTION SUBCUTANEOUS at 22:19

## 2018-09-19 RX ADMIN — Medication 250 MILLIGRAM(S): at 21:38

## 2018-09-19 RX ADMIN — Medication 81 MILLIGRAM(S): at 12:51

## 2018-09-19 RX ADMIN — OXYCODONE AND ACETAMINOPHEN 1 TABLET(S): 5; 325 TABLET ORAL at 20:32

## 2018-09-19 RX ADMIN — Medication 25 MILLIGRAM(S): at 12:55

## 2018-09-19 RX ADMIN — RANOLAZINE 500 MILLIGRAM(S): 500 TABLET, FILM COATED, EXTENDED RELEASE ORAL at 17:40

## 2018-09-19 RX ADMIN — HEPARIN SODIUM 5000 UNIT(S): 5000 INJECTION INTRAVENOUS; SUBCUTANEOUS at 21:03

## 2018-09-19 RX ADMIN — PIPERACILLIN AND TAZOBACTAM 25 GRAM(S): 4; .5 INJECTION, POWDER, LYOPHILIZED, FOR SOLUTION INTRAVENOUS at 14:21

## 2018-09-19 RX ADMIN — SODIUM CHLORIDE 50 MILLILITER(S): 9 INJECTION INTRAMUSCULAR; INTRAVENOUS; SUBCUTANEOUS at 11:50

## 2018-09-19 NOTE — DISCHARGE NOTE ADULT - CARE PROVIDERS DIRECT ADDRESSES
,DirectAddress_Unknown,[email or web address]

## 2018-09-19 NOTE — DISCHARGE NOTE ADULT - HOSPITAL COURSE
68M with h/o  DMT2, HLD, CAD s/p stent on ASA/Plavix who presents  with c/o left second toe wound x 3 weeks. He denies LLE pain, fever/chills. Wound was managed by outpatient Podiatry with oral abx without improvement. Physical exam is notable for redness of left second toe with open bleeding wound, malodorous drainage on distal aspect. MRI of Left foot findings are consistent with osteomyelitis of left 2nd distal phalanx.  Osteomyelitis of left foot, unspecified type. Treated with Zosyn/Vancomycin, to be discharged on augmentin till 9/26. MRI reveals Sinus tracts at the tip of the second digit with osteomyelitis throughout the second distal and middle phalanges and at the head of the second proximal phalanx. FABBY reveals Severe arterial occlusive disease of the left lower extremity at the level of metatarsals. Pt s/p Vascular angiogram 9/17, Pt s/p left 2nd digit resection per podiatry 9/19. To follow up with podiatry and vascular as outpt

## 2018-09-19 NOTE — PROGRESS NOTE ADULT - ASSESSMENT
ASSESSMENT: Patient is a 68M with L 2nd toe wound, planned for podiatry intervention. Vascular surgery consulted to evaluate blood flow to lower extremity.    PLAN:  -pt for lt toe 32 amp by podiatry today  will monitor wound healing progess  will follow

## 2018-09-19 NOTE — PROGRESS NOTE ADULT - ASSESSMENT
Plan:   To OR today at _7:30___ with Dr. Corrigan_____ for _left foot _partial second ray resection_____.   CXR on sunrise.  EKG on sunrise.  Medical/Cardiac clearance since _9/14___ and documented in chart.  Consent signed and in chart.  Procedure was explained to patient in detail. All alternatives, risks and complications were discussed. All questions answered.

## 2018-09-19 NOTE — DISCHARGE NOTE ADULT - PLAN OF CARE
Resolves You've had a partial amputation of the 2nd toe on your left foot.  Follow up with Dr Whittaker within 1 week of discharge from the hospital, please call [phone] for appointment and discuss that you recently were seen in the hospital.  Wound Care: Please leave your dressing clean dry intact until your follow up appointment.  Weight bearing: Please weight bear as tolerated in a surgical shoe.  Antibiotics: Please continue as instructed. HgA1C this admission 7.1  Make sure you get your HgA1c checked every three months.  If you take oral diabetes medications, check your blood glucose two times a day.  If you take insulin, check your blood glucose before meals and at bedtime.  It's important not to skip any meals.  Keep a log of your blood glucose results and always take it with you to your doctor appointments.  Keep a list of your current medications including injectables and over the counter medications and bring this medication list with you to all your doctor appointments.  If you have not seen your ophthalmologist this year call for appointment.  Check your feet daily for redness, sores, or openings. Do not self treat. If no improvement in two days call your primary care physician for an appointment. Follow up with vascular surgeon in 1-2 weeks.  Call for appointment. Low salt diet  Activity as tolerated.  Take all medication as prescribed.  Follow up with your medical doctor for routine blood pressure monitoring at your next visit.  Notify your doctor if you have any of the following symptoms:   Dizziness, Lightheadedness, Blurry vision, Headache, Chest pain, Shortness of breath Continue with medications as prescribed and follow up with your primary healthcare provider. Continue with medication and diet as prescribed. You've had a partial amputation of the 2nd toe on your left foot on 9/19/18.  Follow up with Dr Whittaker within 1 week of discharge from the hospital, please call [phone] for appointment and discuss that you recently were seen in the hospital.  Wound Care: Please leave your dressing clean dry intact until your follow up appointment.  Weight bearing: Please weight bear as tolerated in a surgical shoe.  Antibiotics: Please continue as instructed.

## 2018-09-19 NOTE — DISCHARGE NOTE ADULT - MEDICATION SUMMARY - MEDICATIONS TO TAKE
I will START or STAY ON the medications listed below when I get home from the hospital:    Sameer Low Dose 81 mg oral delayed release tablet  -- 1 tab(s) by mouth once a day  -- Indication: For Coronary artery disease involving native coronary artery of native heart without angina pectoris    acetaminophen 325 mg oral tablet  -- 2 tab(s) by mouth every 6 hours, As needed, Mild and moderate Pain (1 - 6)  -- Indication: For Pain    Ranexa 500 mg oral tablet, extended release  -- 1 tab(s) by mouth 2 times a day  -- Indication: For Chronic Angina    glipiZIDE 10 mg oral tablet, extended release  -- 1 tab(s) by mouth once a day (in the evening)  -- Indication: For Type 2 diabetes mellitus with complication, without long-term current use of insulin    metFORMIN 1000 mg oral tablet  -- 1 tab(s) by mouth 2 times a day  -- Indication: For Type 2 diabetes mellitus with complication, without long-term current use of insulin    Januvia 100 mg oral tablet  -- 1 tab(s) by mouth once a day  -- Indication: For Type 2 diabetes mellitus with complication, without long-term current use of insulin    Invokana 100 mg oral tablet  -- 1 tab(s) by mouth once a day  -- Indication: For Type 2 diabetes mellitus with complication, without long-term current use of insulin    simvastatin 80 mg oral tablet  -- 1 tab(s) by mouth once a day (at bedtime)  -- Indication: For Hyperlipidemia, unspecified hyperlipidemia type    Plavix 75 mg oral tablet  -- 1 tab(s) by mouth once a day (in the evening)  -- Indication: For Coronary artery disease involving native coronary artery of native heart without angina pectoris    metoprolol succinate 25 mg oral tablet, extended release  -- 1 tab(s) by mouth once a day  -- Indication: For Essential hypertension    senna oral tablet  -- 2 tab(s) by mouth once a day (at bedtime)  -- Indication: For Constipation    cilostazol 50 mg oral tablet  -- 1 tab(s) by mouth 2 times a day  -- Indication: For Arterial insufficiency with ischemic ulcer    Augmentin 875 mg-125 mg oral tablet  -- 1 tab(s) by mouth every 12 hours x6 days  -- Indication: For Osteomyelitis of toe of left foot    lactobacillus acidophilus oral capsule  -- 1 cap(s) by mouth 3 times a day (with meals)  -- Indication: For GI prophylaxis while taking antibiotic    multivitamin  -- 1 tab(s) by mouth once a day  -- Indication: For Supplement

## 2018-09-19 NOTE — DISCHARGE NOTE ADULT - PATIENT PORTAL LINK FT
You can access the [facility] Patient Portal, offered by [facility], by registering with the following website: [email or web address]

## 2018-09-19 NOTE — BRIEF OPERATIVE NOTE - PROCEDURE
<<-----Click on this checkbox to enter Procedure Amputation  09/19/2018  left partial 2nd ray resection  Active  [id number]

## 2018-09-19 NOTE — DISCHARGE NOTE ADULT - ADDITIONAL INSTRUCTIONS
Podiatry Discharge Instructions:  Follow up: Please follow up with Dr. Whittaker within 1 week of discharge from the hospital, please call [phone] for appointment and discuss that you recently were seen in the hospital.  Wound Care: Please leave your dressing clean dry intact until your follow up appointment.  Weight bearing: Please weight bear as tolerated in a surgical shoe.  Antibiotics: Please continue as instructed. Follow up with Podiatry, Vascular surgery, and your primary healthcare provider(s) as instructed above.

## 2018-09-19 NOTE — PROGRESS NOTE ADULT - PROBLEM SELECTOR PLAN 1
- c/w Zosyn/Vancomycin day 6  - MRI reveals Sinus tracts at the tip of the second digit with osteomyelitis throughout the second distal and middle phalanges and at the head of the second proximal phalanx  - FABBY reveals Severe arterial occlusive disease of the left lower extremity at the   level of metatarsals  - Pt s/p Vascular angiogram 9/17  - Pt s/p left 2nd digit resection per podiatry 9/19   - Podiatry and vascular recs appreciated  - PT eval

## 2018-09-19 NOTE — DISCHARGE NOTE ADULT - CARE PLAN
Principal Discharge DX:	Osteomyelitis of toe of left foot  Goal:	Resolves  Assessment and plan of treatment:	You've had a partial amputation of the 2nd toe on your left foot.  Follow up with Dr Whittaker within 1 week of discharge from the hospital, please call [phone] for appointment and discuss that you recently were seen in the hospital.  Wound Care: Please leave your dressing clean dry intact until your follow up appointment.  Weight bearing: Please weight bear as tolerated in a surgical shoe.  Antibiotics: Please continue as instructed.  Secondary Diagnosis:	Type 2 diabetes mellitus with complication, without long-term current use of insulin  Assessment and plan of treatment:	HgA1C this admission 7.1  Make sure you get your HgA1c checked every three months.  If you take oral diabetes medications, check your blood glucose two times a day.  If you take insulin, check your blood glucose before meals and at bedtime.  It's important not to skip any meals.  Keep a log of your blood glucose results and always take it with you to your doctor appointments.  Keep a list of your current medications including injectables and over the counter medications and bring this medication list with you to all your doctor appointments.  If you have not seen your ophthalmologist this year call for appointment.  Check your feet daily for redness, sores, or openings. Do not self treat. If no improvement in two days call your primary care physician for an appointment.  Secondary Diagnosis:	Arterial insufficiency with ischemic ulcer  Assessment and plan of treatment:	Follow up with vascular surgeon in 1-2 weeks.  Call for appointment.  Secondary Diagnosis:	Essential hypertension  Assessment and plan of treatment:	Low salt diet  Activity as tolerated.  Take all medication as prescribed.  Follow up with your medical doctor for routine blood pressure monitoring at your next visit.  Notify your doctor if you have any of the following symptoms:   Dizziness, Lightheadedness, Blurry vision, Headache, Chest pain, Shortness of breath  Secondary Diagnosis:	Coronary artery disease involving native coronary artery of native heart without angina pectoris  Assessment and plan of treatment:	Continue with medications as prescribed and follow up with your primary healthcare provider.  Secondary Diagnosis:	Hyperlipidemia, unspecified hyperlipidemia type  Assessment and plan of treatment:	Continue with medication and diet as prescribed. Principal Discharge DX:	Osteomyelitis of toe of left foot  Goal:	Resolves  Assessment and plan of treatment:	You've had a partial amputation of the 2nd toe on your left foot on 9/19/18.  Follow up with Dr Whittaker within 1 week of discharge from the hospital, please call [phone] for appointment and discuss that you recently were seen in the hospital.  Wound Care: Please leave your dressing clean dry intact until your follow up appointment.  Weight bearing: Please weight bear as tolerated in a surgical shoe.  Antibiotics: Please continue as instructed.  Secondary Diagnosis:	Type 2 diabetes mellitus with complication, without long-term current use of insulin  Assessment and plan of treatment:	HgA1C this admission 7.1  Make sure you get your HgA1c checked every three months.  If you take oral diabetes medications, check your blood glucose two times a day.  If you take insulin, check your blood glucose before meals and at bedtime.  It's important not to skip any meals.  Keep a log of your blood glucose results and always take it with you to your doctor appointments.  Keep a list of your current medications including injectables and over the counter medications and bring this medication list with you to all your doctor appointments.  If you have not seen your ophthalmologist this year call for appointment.  Check your feet daily for redness, sores, or openings. Do not self treat. If no improvement in two days call your primary care physician for an appointment.  Secondary Diagnosis:	Arterial insufficiency with ischemic ulcer  Assessment and plan of treatment:	Follow up with vascular surgeon in 1-2 weeks.  Call for appointment.  Secondary Diagnosis:	Essential hypertension  Assessment and plan of treatment:	Low salt diet  Activity as tolerated.  Take all medication as prescribed.  Follow up with your medical doctor for routine blood pressure monitoring at your next visit.  Notify your doctor if you have any of the following symptoms:   Dizziness, Lightheadedness, Blurry vision, Headache, Chest pain, Shortness of breath  Secondary Diagnosis:	Coronary artery disease involving native coronary artery of native heart without angina pectoris  Assessment and plan of treatment:	Continue with medications as prescribed and follow up with your primary healthcare provider.  Secondary Diagnosis:	Hyperlipidemia, unspecified hyperlipidemia type  Assessment and plan of treatment:	Continue with medication and diet as prescribed.

## 2018-09-19 NOTE — DISCHARGE NOTE ADULT - SECONDARY DIAGNOSIS.
Type 2 diabetes mellitus with complication, without long-term current use of insulin Arterial insufficiency with ischemic ulcer Essential hypertension Coronary artery disease involving native coronary artery of native heart without angina pectoris Hyperlipidemia, unspecified hyperlipidemia type

## 2018-09-19 NOTE — DISCHARGE NOTE ADULT - CARE PROVIDER_API CALL
Alejandra Jason), Internal Medicine  [address] [place] [address]  Phone: [phone]  Fax: [phone]    Glen Whittaker ([email or web address]), Podiatric Medicine and Surgery  [address] [place] [address]  Phone: [phone]  Fax: [phone]    Danny Gore), Vascular Surgery  [address] [facility]  [address]  [place] [address]  Phone: [phone]  Fax: [phone]

## 2018-09-20 LAB
ANION GAP SERPL CALC-SCNC: 12 MMOL/L — SIGNIFICANT CHANGE UP (ref 5–17)
BUN SERPL-MCNC: 14 MG/DL — SIGNIFICANT CHANGE UP (ref 7–23)
CALCIUM SERPL-MCNC: 9.1 MG/DL — SIGNIFICANT CHANGE UP (ref 8.4–10.5)
CHLORIDE SERPL-SCNC: 104 MMOL/L — SIGNIFICANT CHANGE UP (ref 96–108)
CO2 SERPL-SCNC: 22 MMOL/L — SIGNIFICANT CHANGE UP (ref 22–31)
CREAT SERPL-MCNC: 1 MG/DL — SIGNIFICANT CHANGE UP (ref 0.5–1.3)
GLUCOSE BLDC GLUCOMTR-MCNC: 149 MG/DL — HIGH (ref 70–99)
GLUCOSE BLDC GLUCOMTR-MCNC: 159 MG/DL — HIGH (ref 70–99)
GLUCOSE BLDC GLUCOMTR-MCNC: 220 MG/DL — HIGH (ref 70–99)
GLUCOSE BLDC GLUCOMTR-MCNC: 226 MG/DL — HIGH (ref 70–99)
GLUCOSE SERPL-MCNC: 162 MG/DL — HIGH (ref 70–99)
HCT VFR BLD CALC: 39.5 % — SIGNIFICANT CHANGE UP (ref 39–50)
HGB BLD-MCNC: 13.4 G/DL — SIGNIFICANT CHANGE UP (ref 13–17)
MAGNESIUM SERPL-MCNC: 1.8 MG/DL — SIGNIFICANT CHANGE UP (ref 1.6–2.6)
MCHC RBC-ENTMCNC: 30.1 PG — SIGNIFICANT CHANGE UP (ref 27–34)
MCHC RBC-ENTMCNC: 33.9 GM/DL — SIGNIFICANT CHANGE UP (ref 32–36)
MCV RBC AUTO: 88.8 FL — SIGNIFICANT CHANGE UP (ref 80–100)
PHOSPHATE SERPL-MCNC: 2.6 MG/DL — SIGNIFICANT CHANGE UP (ref 2.5–4.5)
PLATELET # BLD AUTO: 186 K/UL — SIGNIFICANT CHANGE UP (ref 150–400)
POTASSIUM SERPL-MCNC: 3.9 MMOL/L — SIGNIFICANT CHANGE UP (ref 3.5–5.3)
POTASSIUM SERPL-SCNC: 3.9 MMOL/L — SIGNIFICANT CHANGE UP (ref 3.5–5.3)
RBC # BLD: 4.45 M/UL — SIGNIFICANT CHANGE UP (ref 4.2–5.8)
RBC # FLD: 13.4 % — SIGNIFICANT CHANGE UP (ref 10.3–14.5)
SODIUM SERPL-SCNC: 138 MMOL/L — SIGNIFICANT CHANGE UP (ref 135–145)
WBC # BLD: 7.2 K/UL — SIGNIFICANT CHANGE UP (ref 3.8–10.5)
WBC # FLD AUTO: 7.2 K/UL — SIGNIFICANT CHANGE UP (ref 3.8–10.5)

## 2018-09-20 PROCEDURE — 99231 SBSQ HOSP IP/OBS SF/LOW 25: CPT

## 2018-09-20 PROCEDURE — 99232 SBSQ HOSP IP/OBS MODERATE 35: CPT

## 2018-09-20 PROCEDURE — 99233 SBSQ HOSP IP/OBS HIGH 50: CPT

## 2018-09-20 RX ORDER — OXYCODONE HYDROCHLORIDE 5 MG/1
10 TABLET ORAL EVERY 6 HOURS
Qty: 0 | Refills: 0 | Status: DISCONTINUED | OUTPATIENT
Start: 2018-09-20 | End: 2018-09-21

## 2018-09-20 RX ORDER — ACETAMINOPHEN 500 MG
650 TABLET ORAL EVERY 6 HOURS
Qty: 0 | Refills: 0 | Status: DISCONTINUED | OUTPATIENT
Start: 2018-09-20 | End: 2018-09-21

## 2018-09-20 RX ORDER — OXYCODONE HYDROCHLORIDE 5 MG/1
5 TABLET ORAL EVERY 6 HOURS
Qty: 0 | Refills: 0 | Status: DISCONTINUED | OUTPATIENT
Start: 2018-09-20 | End: 2018-09-21

## 2018-09-20 RX ADMIN — CILOSTAZOL 50 MILLIGRAM(S): 100 TABLET ORAL at 17:43

## 2018-09-20 RX ADMIN — Medication 250 MILLIGRAM(S): at 09:59

## 2018-09-20 RX ADMIN — Medication 100 MILLIGRAM(S): at 05:28

## 2018-09-20 RX ADMIN — Medication 650 MILLIGRAM(S): at 16:17

## 2018-09-20 RX ADMIN — Medication 1: at 17:41

## 2018-09-20 RX ADMIN — SIMVASTATIN 80 MILLIGRAM(S): 20 TABLET, FILM COATED ORAL at 21:58

## 2018-09-20 RX ADMIN — Medication 6 UNIT(S): at 17:41

## 2018-09-20 RX ADMIN — Medication 2: at 13:08

## 2018-09-20 RX ADMIN — PIPERACILLIN AND TAZOBACTAM 25 GRAM(S): 4; .5 INJECTION, POWDER, LYOPHILIZED, FOR SOLUTION INTRAVENOUS at 06:10

## 2018-09-20 RX ADMIN — HEPARIN SODIUM 5000 UNIT(S): 5000 INJECTION INTRAVENOUS; SUBCUTANEOUS at 21:59

## 2018-09-20 RX ADMIN — Medication 1 TABLET(S): at 08:48

## 2018-09-20 RX ADMIN — Medication 25 MILLIGRAM(S): at 05:28

## 2018-09-20 RX ADMIN — CLOPIDOGREL BISULFATE 75 MILLIGRAM(S): 75 TABLET, FILM COATED ORAL at 13:08

## 2018-09-20 RX ADMIN — CILOSTAZOL 50 MILLIGRAM(S): 100 TABLET ORAL at 05:28

## 2018-09-20 RX ADMIN — Medication 650 MILLIGRAM(S): at 09:21

## 2018-09-20 RX ADMIN — INSULIN GLARGINE 12 UNIT(S): 100 INJECTION, SOLUTION SUBCUTANEOUS at 22:15

## 2018-09-20 RX ADMIN — Medication 6 UNIT(S): at 08:48

## 2018-09-20 RX ADMIN — OXYCODONE AND ACETAMINOPHEN 1 TABLET(S): 5; 325 TABLET ORAL at 02:20

## 2018-09-20 RX ADMIN — Medication 6 UNIT(S): at 13:08

## 2018-09-20 RX ADMIN — Medication 1 TABLET(S): at 13:09

## 2018-09-20 RX ADMIN — PIPERACILLIN AND TAZOBACTAM 25 GRAM(S): 4; .5 INJECTION, POWDER, LYOPHILIZED, FOR SOLUTION INTRAVENOUS at 15:00

## 2018-09-20 RX ADMIN — PIPERACILLIN AND TAZOBACTAM 25 GRAM(S): 4; .5 INJECTION, POWDER, LYOPHILIZED, FOR SOLUTION INTRAVENOUS at 23:44

## 2018-09-20 RX ADMIN — Medication 100 MILLIGRAM(S): at 17:43

## 2018-09-20 RX ADMIN — Medication 1 TABLET(S): at 13:08

## 2018-09-20 RX ADMIN — HEPARIN SODIUM 5000 UNIT(S): 5000 INJECTION INTRAVENOUS; SUBCUTANEOUS at 05:29

## 2018-09-20 RX ADMIN — Medication 81 MILLIGRAM(S): at 13:09

## 2018-09-20 RX ADMIN — Medication 650 MILLIGRAM(S): at 16:47

## 2018-09-20 RX ADMIN — HEPARIN SODIUM 5000 UNIT(S): 5000 INJECTION INTRAVENOUS; SUBCUTANEOUS at 15:00

## 2018-09-20 RX ADMIN — SENNA PLUS 2 TABLET(S): 8.6 TABLET ORAL at 21:45

## 2018-09-20 RX ADMIN — Medication 250 MILLIGRAM(S): at 21:44

## 2018-09-20 RX ADMIN — OXYCODONE AND ACETAMINOPHEN 1 TABLET(S): 5; 325 TABLET ORAL at 01:20

## 2018-09-20 RX ADMIN — RANOLAZINE 500 MILLIGRAM(S): 500 TABLET, FILM COATED, EXTENDED RELEASE ORAL at 17:43

## 2018-09-20 RX ADMIN — Medication 1 TABLET(S): at 17:43

## 2018-09-20 RX ADMIN — RANOLAZINE 500 MILLIGRAM(S): 500 TABLET, FILM COATED, EXTENDED RELEASE ORAL at 05:28

## 2018-09-20 RX ADMIN — Medication 650 MILLIGRAM(S): at 08:51

## 2018-09-20 RX ADMIN — SODIUM CHLORIDE 50 MILLILITER(S): 9 INJECTION INTRAMUSCULAR; INTRAVENOUS; SUBCUTANEOUS at 08:51

## 2018-09-20 NOTE — PROGRESS NOTE ADULT - ASSESSMENT
68 year old male with PMH DM2 (A1C 7.1), CAD s/p stent on ASA/Plavix, HLD who presented to [facility] on 9/13 with left second toe wound of ~3 week duration.  In the ED afebrile, normotensive and not tachycardic. WBC on presentation of 7.0 without bands. ESR 16. CRP of 0.11. XR of L foot with erosion of the second distal phalanx with only a portion of the base remaining, which is dorsally displaced. Findings are compatible with osteomyelitis. Patient started on Vancomycin and Zosyn. MRI of L foot with sinus tracts at the tip of the second digit with osteomyelitis throughout the second distal and middle phalanges and at the head of the second proximal phalanx. Superficial wound cultures with Corynebacterium.     2nd digit OM on L Foot - status post amputation of left second toe 9/19  Peripheral Vascular Disease    Suggest  continue vancomycin/zosyn 9/13 -->  if wound appears clear at dressing change 9/21, consider change IV antibiotics to Augmented 837 mg po twice daily through 9.26

## 2018-09-20 NOTE — PROGRESS NOTE ADULT - ASSESSMENT
68M with h/o  DMT2, HLD, CAD s/p stent on ASA/Plavix who presents  with c/o left second toe wound x 3 weeks. He denies LLE pain, fever/chills. Wound was managed by outpatient Podiatry with oral abx without improvement. Physical exam is notable for redness of left second toe with open bleeding wound, malodorous drainage on distal aspect. MRI of Left foot findings are consistent with osteomyelitis of left 2nd distal phalanx.

## 2018-09-20 NOTE — CHART NOTE - NSCHARTNOTEFT_GEN_A_CORE
Informed by RN of temp of 100.5F    68M with h/o  DMT2, HLD, CAD s/p stent on ASA/Plavix who presents  with c/o left second toe wound x 3 weeks    S/p Amputation left partial 2nd ray resection yesterday; tissue cultures in lab and not resulted yet    Patient on vancomycin/ zosyn and is followed Infectious disease    No fever work-up at this time as most likely post-op temp; will cont to monitor and if temp above 101F will send cultures      ILYA Gaytan [id number]

## 2018-09-20 NOTE — PROGRESS NOTE ADULT - ASSESSMENT
69yo M s/p L partial 2nd ray amputation 9/19  ·	Pt seen and evaluated  ·	Surgical site well coapted, no acute SOI, no signs dehiscence  ·	No growth clean specimen at this time  ·	No further intervention planned, if cultures remain negative tomorrow from OR will recommend discharge on po abx very low concern for any residual infection, likely d/c on 9/21  ·	Steri strip & dsd applied w/ light ace  ·	d/w attending

## 2018-09-20 NOTE — PROGRESS NOTE ADULT - PROBLEM SELECTOR PLAN 1
- c/w Zosyn/Vancomycin day 7  - MRI reveals Sinus tracts at the tip of the second digit with osteomyelitis throughout the second distal and middle phalanges and at the head of the second proximal phalanx  - FABBY reveals Severe arterial occlusive disease of the left lower extremity at the   level of metatarsals  - Pt s/p Vascular angiogram 9/17  - Pt s/p left 2nd digit resection per podiatry 9/19   - Podiatry and vascular recs appreciated  - PT eval  - Pain control with tylenol and oxycodone prn, bowel regimen with senna, colace, miralax

## 2018-09-20 NOTE — PROGRESS NOTE ADULT - ASSESSMENT
ASSESSMENT: Patient is a 68M with L 2nd toe wound, planned for podiatry intervention. Vascular surgery consulted to evaluate blood flow to lower extremity.    PLAN:  -s/p lt toe 2 amp   will monitor wound healing progess  continue pletal 50 bid   will follow

## 2018-09-21 VITALS
HEART RATE: 101 BPM | OXYGEN SATURATION: 97 % | RESPIRATION RATE: 18 BRPM | DIASTOLIC BLOOD PRESSURE: 72 MMHG | SYSTOLIC BLOOD PRESSURE: 107 MMHG

## 2018-09-21 LAB
ANION GAP SERPL CALC-SCNC: 9 MMOL/L — SIGNIFICANT CHANGE UP (ref 5–17)
BUN SERPL-MCNC: 13 MG/DL — SIGNIFICANT CHANGE UP (ref 7–23)
CALCIUM SERPL-MCNC: 9.6 MG/DL — SIGNIFICANT CHANGE UP (ref 8.4–10.5)
CHLORIDE SERPL-SCNC: 102 MMOL/L — SIGNIFICANT CHANGE UP (ref 96–108)
CO2 SERPL-SCNC: 26 MMOL/L — SIGNIFICANT CHANGE UP (ref 22–31)
CREAT SERPL-MCNC: 1.1 MG/DL — SIGNIFICANT CHANGE UP (ref 0.5–1.3)
GLUCOSE BLDC GLUCOMTR-MCNC: 184 MG/DL — HIGH (ref 70–99)
GLUCOSE BLDC GLUCOMTR-MCNC: 246 MG/DL — HIGH (ref 70–99)
GLUCOSE BLDC GLUCOMTR-MCNC: 268 MG/DL — HIGH (ref 70–99)
GLUCOSE SERPL-MCNC: 149 MG/DL — HIGH (ref 70–99)
HCT VFR BLD CALC: 38.4 % — LOW (ref 39–50)
HGB BLD-MCNC: 13.4 G/DL — SIGNIFICANT CHANGE UP (ref 13–17)
MCHC RBC-ENTMCNC: 30.7 PG — SIGNIFICANT CHANGE UP (ref 27–34)
MCHC RBC-ENTMCNC: 34.9 GM/DL — SIGNIFICANT CHANGE UP (ref 32–36)
MCV RBC AUTO: 88.1 FL — SIGNIFICANT CHANGE UP (ref 80–100)
PLATELET # BLD AUTO: 172 K/UL — SIGNIFICANT CHANGE UP (ref 150–400)
POTASSIUM SERPL-MCNC: 3.9 MMOL/L — SIGNIFICANT CHANGE UP (ref 3.5–5.3)
POTASSIUM SERPL-SCNC: 3.9 MMOL/L — SIGNIFICANT CHANGE UP (ref 3.5–5.3)
RBC # BLD: 4.36 M/UL — SIGNIFICANT CHANGE UP (ref 4.2–5.8)
RBC # FLD: 13.8 % — SIGNIFICANT CHANGE UP (ref 10.3–14.5)
SODIUM SERPL-SCNC: 137 MMOL/L — SIGNIFICANT CHANGE UP (ref 135–145)
WBC # BLD: 6.37 K/UL — SIGNIFICANT CHANGE UP (ref 3.8–10.5)
WBC # FLD AUTO: 6.37 K/UL — SIGNIFICANT CHANGE UP (ref 3.8–10.5)

## 2018-09-21 PROCEDURE — 99232 SBSQ HOSP IP/OBS MODERATE 35: CPT

## 2018-09-21 PROCEDURE — 99239 HOSP IP/OBS DSCHRG MGMT >30: CPT

## 2018-09-21 RX ORDER — INSULIN LISPRO 100/ML
7 VIAL (ML) SUBCUTANEOUS
Qty: 0 | Refills: 0 | Status: DISCONTINUED | OUTPATIENT
Start: 2018-09-21 | End: 2018-09-21

## 2018-09-21 RX ORDER — SENNA PLUS 8.6 MG/1
2 TABLET ORAL
Qty: 0 | Refills: 0 | DISCHARGE
Start: 2018-09-21

## 2018-09-21 RX ORDER — LACTOBACILLUS ACIDOPHILUS 100MM CELL
1 CAPSULE ORAL
Qty: 0 | Refills: 0 | COMMUNITY
Start: 2018-09-21

## 2018-09-21 RX ORDER — IMIQUIMOD 50 MG/G
1 CREAM TOPICAL
Qty: 0 | Refills: 0 | COMMUNITY

## 2018-09-21 RX ORDER — LACTOBACILLUS ACIDOPHILUS 100MM CELL
1 CAPSULE ORAL
Qty: 42 | Refills: 0
Start: 2018-09-21 | End: 2018-10-04

## 2018-09-21 RX ORDER — ACETAMINOPHEN 500 MG
2 TABLET ORAL
Qty: 0 | Refills: 0 | DISCHARGE
Start: 2018-09-21

## 2018-09-21 RX ORDER — CICLOPIROX OLAMINE 7.7 MG/G
1 CREAM TOPICAL
Qty: 0 | Refills: 0 | COMMUNITY

## 2018-09-21 RX ADMIN — CILOSTAZOL 50 MILLIGRAM(S): 100 TABLET ORAL at 17:58

## 2018-09-21 RX ADMIN — HEPARIN SODIUM 5000 UNIT(S): 5000 INJECTION INTRAVENOUS; SUBCUTANEOUS at 06:22

## 2018-09-21 RX ADMIN — Medication 81 MILLIGRAM(S): at 11:58

## 2018-09-21 RX ADMIN — Medication 25 MILLIGRAM(S): at 06:22

## 2018-09-21 RX ADMIN — INFLUENZA VIRUS VACCINE 0.5 MILLILITER(S): 15; 15; 15; 15 SUSPENSION INTRAMUSCULAR at 18:03

## 2018-09-21 RX ADMIN — Medication 250 MILLIGRAM(S): at 11:58

## 2018-09-21 RX ADMIN — Medication 1: at 08:55

## 2018-09-21 RX ADMIN — Medication 7 UNIT(S): at 12:51

## 2018-09-21 RX ADMIN — CILOSTAZOL 50 MILLIGRAM(S): 100 TABLET ORAL at 06:22

## 2018-09-21 RX ADMIN — HEPARIN SODIUM 5000 UNIT(S): 5000 INJECTION INTRAVENOUS; SUBCUTANEOUS at 13:43

## 2018-09-21 RX ADMIN — Medication 1 TABLET(S): at 17:58

## 2018-09-21 RX ADMIN — PIPERACILLIN AND TAZOBACTAM 25 GRAM(S): 4; .5 INJECTION, POWDER, LYOPHILIZED, FOR SOLUTION INTRAVENOUS at 13:44

## 2018-09-21 RX ADMIN — Medication 3: at 17:58

## 2018-09-21 RX ADMIN — POLYETHYLENE GLYCOL 3350 17 GRAM(S): 17 POWDER, FOR SOLUTION ORAL at 11:58

## 2018-09-21 RX ADMIN — RANOLAZINE 500 MILLIGRAM(S): 500 TABLET, FILM COATED, EXTENDED RELEASE ORAL at 06:22

## 2018-09-21 RX ADMIN — Medication 2: at 12:51

## 2018-09-21 RX ADMIN — Medication 1 TABLET(S): at 11:58

## 2018-09-21 RX ADMIN — Medication 100 MILLIGRAM(S): at 06:22

## 2018-09-21 RX ADMIN — RANOLAZINE 500 MILLIGRAM(S): 500 TABLET, FILM COATED, EXTENDED RELEASE ORAL at 17:58

## 2018-09-21 RX ADMIN — Medication 1 TABLET(S): at 08:15

## 2018-09-21 RX ADMIN — PIPERACILLIN AND TAZOBACTAM 25 GRAM(S): 4; .5 INJECTION, POWDER, LYOPHILIZED, FOR SOLUTION INTRAVENOUS at 06:22

## 2018-09-21 RX ADMIN — CLOPIDOGREL BISULFATE 75 MILLIGRAM(S): 75 TABLET, FILM COATED ORAL at 11:58

## 2018-09-21 NOTE — PROGRESS NOTE ADULT - PROBLEM SELECTOR PLAN 4
- well controlled  - c/w Toprol XL 25mg qd

## 2018-09-21 NOTE — PROGRESS NOTE ADULT - ASSESSMENT
67yo M s/p L partial 2nd ray amputation 9/19  ·	Pt seen and evaluated  ·	Surgical site well coapted, no acute SOI, no signs dehiscence  ·	No growth clean specimen at this time  ·	No further intervention planned  ·	Steri strip & dsd applied w/ light ace  ·	pod stable for dc with abx per ID  ·	d/w attending

## 2018-09-21 NOTE — PROGRESS NOTE ADULT - PROBLEM SELECTOR PLAN 2
- Chronic  - FS labile   - HA1C 7.1  - Hold Home PO regimen (Metformin, Januvia, Invokana)  - Increase Lantus to 12units QHS + Premeal 6units + ISS  - Carb- consistent /DASH- TLC diet
- Chronic  - FS labile but better controlled on current regimen   - HA1C 7.1  - Hold Home PO regimen (Metformin, Januvia, Invokana)  - C/w Lantus to 10units QHS + Premeal 6units + ISS  - Carb- consistent /DASH- TLC diet
- Chronic  - FS labile   - HA1C 7.1  - Hold Home PO regimen (Metformin, Januvia, Invokana)  - Increase Lantus to 12units QHS + Premeal 6units + ISS  - Carb- consistent /DASH- TLC diet
- Chronic  - FS labile   - HA1C 7.1  - Hold Home PO regimen (Metformin, Januvia, Invokana)  - Increase Lantus to 12units QHS + Premeal 6units + ISS  - Carb- consistent /DASH- TLC diet
- Chronic  - FS labile   - HA1C 7.1  - Hold Home PO regimen (Metformin, Januvia, Invokana)  - Increase Lantus to 12units QHS + Premeal 7units + ISS  - Carb- consistent /DASH- TLC diet
- Chronic  - Uncontrolled fingersticks   - HA1C 7.1  - Hold Home PO regimen (Metformin, Januvia, Invokana)  - Increase Lantus to 10units QHS + Premeal 6units + ISS  - Carb- consistent /DASH- TLC diet
- Chronic  - Uncontrolled fingersticks   - HA1C 7.1  - Hold Home PO regimen (Metformin, Januvia, Invokana)  - Start pt on Lantus 6units QHS + Premeal 3units + ISS  - Carb- consistent /DASH- TLC diet
- well controlled  - pt is on Metformin, Januvia, Invokana at home  - hold oral hypoglycemics during hospitalization  - start CAMILLE  - FS qac qhs  - HgA1c ordered  - Carb- consistent /DASH- TLC diet

## 2018-09-21 NOTE — PROGRESS NOTE ADULT - ATTENDING COMMENTS
Patient seen and examined.  Agree with above.   -pt. for peripheral angiogram monday  -follow up vascular  -will follow with you    Ismael Szymanski MD
Agree with above assessment and plan as outlined above.    - Cont medical management of CAD  - Abx per ID    Jorden Anthony MD, FACC
Agree with above.   -optimized from cv perspective for planned peripheral angiogram    Ismael Szymanski MD
Patient seen and examined, agree with above assessment and plan as transcribed above.    - cont ASA, Statin, BB  - Optimized from cardiac standpoint for amputation  - keep double product <10,000    Jorden Anthony MD, FACC
Patient seen and examined, agree with above assessment and plan as transcribed above.    - progressing well    Jorden Anthony MD, FACC
Patient seen and examined.  Agree with above.   -Optimized from cv perspective for peripheral angiogram tomorrow    Ismael Szymanski MD
Patient seen and examined.  Agree with above.  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
as above
D/w family at bedside updated on plan of care
D/w wife at bedside updated on full plan of care
Pending ID recs pt can be discharged today on course of oral abx   Discharge time 48 min

## 2018-09-21 NOTE — PROGRESS NOTE ADULT - SUBJECTIVE AND OBJECTIVE BOX
Patient is a 68y old  Male who presents with a chief complaint of left second toe wound (17 Sep 2018 08:52)      SUBJECTIVE / OVERNIGHT EVENTS: pt comfortable, wife at bedside, denies LE swelling, no cp, sob, n/v, had bm yesterday, swelling in left second digit is better     MEDICATIONS  (STANDING):  aspirin enteric coated 81 milliGRAM(s) Oral daily  cilostazol 50 milliGRAM(s) Oral two times a day  clopidogrel Tablet 75 milliGRAM(s) Oral daily  dextrose 5%. 1000 milliLiter(s) (50 mL/Hr) IV Continuous <Continuous>  dextrose 50% Injectable 12.5 Gram(s) IV Push once  dextrose 50% Injectable 25 Gram(s) IV Push once  dextrose 50% Injectable 25 Gram(s) IV Push once  heparin  Injectable 5000 Unit(s) SubCutaneous every 8 hours  influenza   Vaccine 0.5 milliLiter(s) IntraMuscular once  insulin glargine Injectable (LANTUS) 10 Unit(s) SubCutaneous at bedtime  insulin lispro (HumaLOG) corrective regimen sliding scale   SubCutaneous three times a day before meals  insulin lispro Injectable (HumaLOG) 6 Unit(s) SubCutaneous three times a day before meals  metoprolol succinate ER 25 milliGRAM(s) Oral daily  multivitamin 1 Tablet(s) Oral daily  piperacillin/tazobactam IVPB. 3.375 Gram(s) IV Intermittent every 8 hours  ranolazine 500 milliGRAM(s) Oral two times a day  simvastatin 80 milliGRAM(s) Oral at bedtime  vancomycin  IVPB 1000 milliGRAM(s) IV Intermittent every 12 hours    MEDICATIONS  (PRN):  dextrose 40% Gel 15 Gram(s) Oral once PRN Blood Glucose LESS THAN 70 milliGRAM(s)/deciliter  glucagon  Injectable 1 milliGRAM(s) IntraMuscular once PRN Glucose LESS THAN 70 milligrams/deciliter        CAPILLARY BLOOD GLUCOSE      POCT Blood Glucose.: 195 mg/dL (17 Sep 2018 08:58)  POCT Blood Glucose.: 171 mg/dL (16 Sep 2018 22:00)  POCT Blood Glucose.: 178 mg/dL (16 Sep 2018 17:17)  POCT Blood Glucose.: 194 mg/dL (16 Sep 2018 12:26)    I&O's Summary    16 Sep 2018 07:01  -  17 Sep 2018 07:00  --------------------------------------------------------  IN: 2070 mL / OUT: 0 mL / NET: 2070 mL        PHYSICAL EXAM:  GENERAL: NAD, well-developed  HEAD:  Atraumatic, Normocephalic  EYES: EOMI, PERRLA, conjunctiva and sclera clear  NECK: Supple, No JVD  CHEST/LUNG: Clear to auscultation bilaterally; No wheeze  HEART: Regular rate and rhythm; S1S2  ABDOMEN: Soft, Nontender, Nondistended; Bowel sounds present  EXTREMITIES:  2+ Peripheral Pulses, No clubbing, cyanosis, or edema, left 2nd digit swelling  PSYCH: AAOx3  NEUROLOGY: non-focal  SKIN: No rashes or lesions    LABS:                        15.6   5.63  )-----------( 271      ( 17 Sep 2018 09:26 )             44.9     09-17    132<L>  |  95<L>  |  22  ----------------------------<  232<H>  3.7   |  26  |  1.26    Ca    10.0      17 Sep 2018 07:04      PT/INR - ( 17 Sep 2018 09:26 )   PT: 11.5 sec;   INR: 1.02 ratio         PTT - ( 17 Sep 2018 09:26 )  PTT:28.5 sec          RADIOLOGY & ADDITIONAL TESTS:    Imaging Personally Reviewed:    Consultant(s) Notes Reviewed:  cardiology, podiatry     Care Discussed with Consultants/Other Providers:
Patient is a 68y old  Male who presents with a chief complaint of left second toe wound (19 Sep 2018 10:26)      SUBJECTIVE / OVERNIGHT EVENTS: Pt seen post partial resection of left foot second digit, no pain in foot, no n/v, no cp, wants to eat, family at bedside     MEDICATIONS  (STANDING):  aspirin enteric coated 81 milliGRAM(s) Oral daily  cilostazol 50 milliGRAM(s) Oral two times a day  clopidogrel Tablet 75 milliGRAM(s) Oral daily  dextrose 5%. 1000 milliLiter(s) (50 mL/Hr) IV Continuous <Continuous>  dextrose 50% Injectable 12.5 Gram(s) IV Push once  dextrose 50% Injectable 25 Gram(s) IV Push once  dextrose 50% Injectable 25 Gram(s) IV Push once  docusate sodium 100 milliGRAM(s) Oral two times a day  heparin  Injectable 5000 Unit(s) SubCutaneous every 8 hours  influenza   Vaccine 0.5 milliLiter(s) IntraMuscular once  insulin lispro (HumaLOG) corrective regimen sliding scale   SubCutaneous three times a day before meals  insulin lispro Injectable (HumaLOG) 6 Unit(s) SubCutaneous three times a day before meals  lactobacillus acidophilus 1 Tablet(s) Oral three times a day with meals  metoprolol succinate ER 25 milliGRAM(s) Oral daily  multivitamin 1 Tablet(s) Oral daily  piperacillin/tazobactam IVPB. 3.375 Gram(s) IV Intermittent every 8 hours  polyethylene glycol 3350 17 Gram(s) Oral daily  ranolazine 500 milliGRAM(s) Oral two times a day  senna 2 Tablet(s) Oral at bedtime  simvastatin 80 milliGRAM(s) Oral at bedtime  sodium chloride 0.9%. 1000 milliLiter(s) (50 mL/Hr) IV Continuous <Continuous>  vancomycin  IVPB 1000 milliGRAM(s) IV Intermittent every 12 hours    MEDICATIONS  (PRN):  acetaminophen   Tablet .. 650 milliGRAM(s) Oral every 6 hours PRN Mild Pain (1 - 3)  dextrose 40% Gel 15 Gram(s) Oral once PRN Blood Glucose LESS THAN 70 milliGRAM(s)/deciliter  glucagon  Injectable 1 milliGRAM(s) IntraMuscular once PRN Glucose LESS THAN 70 milligrams/deciliter  morphine  - Injectable 2 milliGRAM(s) IV Push every 4 hours PRN Severe Pain (7 - 10)  oxyCODONE    5 mG/acetaminophen 325 mG 1 Tablet(s) Oral every 6 hours PRN Moderate Pain (4 - 6)        CAPILLARY BLOOD GLUCOSE      POCT Blood Glucose.: 185 mg/dL (19 Sep 2018 06:22)  POCT Blood Glucose.: 158 mg/dL (18 Sep 2018 22:24)  POCT Blood Glucose.: 252 mg/dL (18 Sep 2018 17:11)  POCT Blood Glucose.: 286 mg/dL (18 Sep 2018 13:23)    I&O's Summary    18 Sep 2018 07:01  -  19 Sep 2018 07:00  --------------------------------------------------------  IN: 930 mL / OUT: 0 mL / NET: 930 mL        PHYSICAL EXAM:  GENERAL: NAD, well-developed  HEAD:  Atraumatic, normocephalic  EYES: conjunctiva and sclera clear  NECK:  No JVD  CHEST/LUNG: Clear to auscultation bilaterally; No wheeze  HEART: Regular rate and rhythm; S1S2  ABDOMEN: Soft, Nontender, Nondistended; Bowel sounds present  EXTREMITIES:  2+ Peripheral Pulses, No clubbing, cyanosis, or edema RLE, left foot dressing/ace wrap   PSYCH: AAOx3  NEUROLOGY: non-focal  SKIN: No rashes or lesions    LABS:                        13.7   4.07  )-----------( 207      ( 19 Sep 2018 08:00 )             40.8     09-19    138  |  101  |  19  ----------------------------<  184<H>  4.3   |  24  |  1.08    Ca    9.1      19 Sep 2018 07:00  Phos  2.8     -18  Mg     2.0     09-18      PT/INR - ( 19 Sep 2018 08:02 )   PT: 12.6 sec;   INR: 1.11 ratio         PTT - ( 19 Sep 2018 08:02 )  PTT:27.1 sec      Urinalysis Basic - ( 18 Sep 2018 09:09 )    Color: Yellow / Appearance: Clear / S.024 / pH: x  Gluc: x / Ketone: Trace  / Bili: Negative / Urobili: Negative mg/dL   Blood: x / Protein: Negative mg/dL / Nitrite: Negative   Leuk Esterase: Negative / RBC: x / WBC x   Sq Epi: x / Non Sq Epi: x / Bacteria: x        RADIOLOGY & ADDITIONAL TESTS:    Imaging Personally Reviewed:    Consultant(s) Notes Reviewed:  podiatry     Care Discussed with Consultants/Other Providers:
Subjective:  pt seen and examined, no complaints, ROS - .     aspirin enteric coated 81 milliGRAM(s) Oral daily  cilostazol 50 milliGRAM(s) Oral two times a day  clopidogrel Tablet 75 milliGRAM(s) Oral daily  dextrose 40% Gel 15 Gram(s) Oral once PRN  dextrose 5%. 1000 milliLiter(s) IV Continuous <Continuous>  dextrose 50% Injectable 12.5 Gram(s) IV Push once  dextrose 50% Injectable 25 Gram(s) IV Push once  dextrose 50% Injectable 25 Gram(s) IV Push once  glucagon  Injectable 1 milliGRAM(s) IntraMuscular once PRN  heparin  Injectable 5000 Unit(s) SubCutaneous every 8 hours  influenza   Vaccine 0.5 milliLiter(s) IntraMuscular once  insulin glargine Injectable (LANTUS) 6 Unit(s) SubCutaneous at bedtime  insulin lispro (HumaLOG) corrective regimen sliding scale   SubCutaneous three times a day before meals  insulin lispro Injectable (HumaLOG) 3 Unit(s) SubCutaneous three times a day before meals  metoprolol succinate ER 25 milliGRAM(s) Oral daily  multivitamin 1 Tablet(s) Oral daily  piperacillin/tazobactam IVPB. 3.375 Gram(s) IV Intermittent every 8 hours  ranolazine 500 milliGRAM(s) Oral two times a day  simvastatin 80 milliGRAM(s) Oral at bedtime  vancomycin  IVPB 1000 milliGRAM(s) IV Intermittent every 12 hours          Hemoglobin: 14.3 g/dL (09-13 @ 14:31)            Creatinine Trend: 1.10<--    COAGS:           T(C): 36.6 (09-15-18 @ 20:54), Max: 36.8 (09-15-18 @ 09:03)  HR: 83 (09-15-18 @ 20:54) (80 - 85)  BP: 100/66 (09-15-18 @ 20:54) (100/66 - 119/78)  RR: 18 (09-15-18 @ 20:54) (18 - 18)  SpO2: 96% (09-15-18 @ 20:54) (96% - 96%)  Wt(kg): --    I&O's Summary    14 Sep 2018 07:01  -  15 Sep 2018 07:00  --------------------------------------------------------  IN: 950 mL / OUT: 1 mL / NET: 949 mL    15 Sep 2018 07:01  -  16 Sep 2018 04:58  --------------------------------------------------------  IN: 710 mL / OUT: 0 mL / NET: 710 mL        Appearance: Normal	  HEENT:   Normal oral mucosa, PERRL, EOMI	  Lymphatic: No lymphadenopathy , no edema  Cardiovascular: Normal S1 S2, No JVD, No murmurs , Peripheral pulses palpable 2+ bilaterally  Respiratory: Lungs clear to auscultation, normal effort 	  Gastrointestinal:  Soft, Non-tender, + BS	  Skin: No rashes, No ecchymoses, No cyanosis, warm to touch  Musculoskeletal: Normal range of motion, normal strength  Psychiatry:  Mood & affect appropriate    TELEMETRY: 	  none     DIAGNOSTIC TESTING:  [ ] Echocardiogram: < from: Transthoracic Echocardiogram w/Doppler (10.24.12 @ 16:16) >  Conclusions:  1. Mitral annular calcification, otherwise normal mitral  valve. No mitral valve regurgitation seen.  2. Normal trileaflet aortic valve. No aortic valve  regurgitation seen.  3. Normal left atrium.  LA volume index = 20 cc/m2.  4. Normal left ventricular internal dimensions and wall  thickness.  5. Low normal left ventricular systolic function. EF 55%.  The basal inferior and infero-septal walls are severely  hypokinetic.  6. Mild diastolic dysfunction (Stage I).  7. Normal right atrium.  8. Normal right ventricular size and function.  9. Estimated right ventricular systolic pressure equals 27  mm Hg, assuming right atrial pressure equals 10 mm Hg,  consistent with normal pulmonary pressures.  10. Normal tricuspid valve. Mild tricuspid regurgitation.    < end of copied text >    [ ]  Catheterization:  [ ] Stress Test:   < from: Nuclear Stress Test, Exercise (06.27.12 @ 00:00) >  ------------------------------------------------------------------------  IMPRESSIONS:Abnormal Study  * Exercise capacity: 11 METS, Excellent for age and  gender.  * Chest Pain: No chest pain with exercise.  * Symptom: No Symptom.  * HR Response: Appropriate.  * BP Response: Appropriate.  * Heart Rhythm: Normal Sinus Rhythm - 79 BPM.  * ECG Changes: ST Depression: 2 mm slowly upsloping in  leads V4, V5, V6 started at 06:00 min of exercise at HR of  130 and persisted 07:00 min into recovery.  All Changes  resolved by 7 minutes of recovery.  * Arrhythmia: None.  * Myocardial Perfusion SPECT results are abnormal at 97 %  of MPHR.  * There are medium sized, moderate defects in the inferior  and inferoseptal walls that are fixed, consistent with  infarct; this defect partially corrects with prone  imgaing, suggesting a significant component of  diaphragmatic attenuation artifact is contributing to its  extent.  There is no evidence of significant ischemia.  * Post-stress gated wall motion analysis was performed  (LVEF = 49 %;LVEDV = 99 ml.)with  mild hypokinesis of the  inferior wall.  * Artifact was present as noted above.    < end of copied text >    OTHER: 	  MRI: < from: MR Foot w/wo IV Cont, Left (09.14.18 @ 21:14) >  Impression: Sinus tracts at the tip of the second digit with   osteomyelitis throughout the second distal and middle phalanges and at   the head of the second proximal phalanx. Marrow edema within the   remainder of the second proximal phalanx is favored to be reactive at   this time. Adjacent cellulitis and nonspecific myositis.    < end of copied text >        ASSESSMENT/PLAN: 	68y Male hx of HTN, Chol , DM, CAD with stents , now left foot non healing wound.     POD follow up appreciated   Vascular follow up, peripheral angio monday   DAPT , pletal ,   Cont IV ABX , F/U on Blood cultures   Cont BB , stable BP   D/W Dr Szymanski
Follow Up:  osteo of 2nd lsft toe    Interval History/ROS: was doing well when seen this am - anxious about procedure     Allergies  No Known Allergies    ANTIMICROBIALS:  piperacillin/tazobactam IVPB. 3.375 every 8 hours  vancomycin  IVPB 1000 every 12 hours    OTHER MEDS:  MEDICATIONS  (STANDING):  aspirin enteric coated 81 daily  cilostazol 50 two times a day  clopidogrel Tablet 75 daily  dextrose 40% Gel 15 once PRN  dextrose 50% Injectable 12.5 once  dextrose 50% Injectable 25 once  dextrose 50% Injectable 25 once  docusate sodium 100 two times a day  glucagon  Injectable 1 once PRN  heparin  Injectable 5000 every 8 hours  influenza   Vaccine 0.5 once  insulin glargine Injectable (LANTUS) 12 at bedtime  insulin lispro (HumaLOG) corrective regimen sliding scale  three times a day before meals  insulin lispro Injectable (HumaLOG) 6 three times a day before meals  metoprolol succinate ER 25 daily  polyethylene glycol 3350 17 daily  ranolazine 500 two times a day  senna 2 at bedtime  simvastatin 80 at bedtime      Vital Signs Last 24 Hrs  T(C): 36.3 (18 Sep 2018 12:14), Max: 36.9 (17 Sep 2018 20:39)  T(F): 97.4 (18 Sep 2018 12:14), Max: 98.4 (17 Sep 2018 20:39)  HR: 96 (18 Sep 2018 12:14) (72 - 96)  BP: 113/62 (18 Sep 2018 12:14) (113/62 - 135/76)  BP(mean): --  RR: 18 (18 Sep 2018 12:14) (17 - 18)  SpO2: 96% (18 Sep 2018 12:14) (95% - 96%)    PHYSICAL EXAM:  General: WN/WD NAD, Non-toxic  Neurology: A&Ox3, nonfocal  Respiratory: Clear to auscultation bilaterally  CV: RRR, S1S2, no murmurs, rubs or gallops  Abdominal: Soft, Non-tender, non-distended  Extremities: No edema, dressing clean and dry  Line Sites: Clear  Skin: No rash               14.8   4.67  )-----------( 239      ( 18 Sep 2018 09:06 )             43.6   Sedimentation Rate, Erythrocyte (18 @ 14:31)    Sedimentation Rate, Erythrocyte: 16 mm/hr  C-Reactive Protein, Serum (18 @ 16:22)    C-Reactive Protein, Serum: 0.11 mg/dL              136  |  100  |  20  ----------------------------<  218<H>  4.4   |  26  |  1.22    Ca    9.6      18 Sep 2018 07:24  Phos  2.8       Mg     2.0             Urinalysis Basic - ( 18 Sep 2018 09:09 )    Color: Yellow / Appearance: Clear / S.024 / pH: x  Gluc: x / Ketone: Trace  / Bili: Negative / Urobili: Negative mg/dL   Blood: x / Protein: Negative mg/dL / Nitrite: Negative   Leuk Esterase: Negative / RBC: x / WBC x   Sq Epi: x / Non Sq Epi: x / Bacteria: x        MICROBIOLOGY:  .Abscess Wound  18   Moderate Corynebacterium species  "Susceptibilities not performed"  --  --      .Blood Blood  18   No growth to date.  --  --    Vancomycin Level, Random: 14.4 ug/mL (18 @ 21:41)      RADIOLOGY:    Boris Jefferson MD; Division of Infectious Disease; Pager: [phone]; nights and weekends: [phone]
Follow Up:  s/p amputation of left 2nd toe for osteo    Interval History/ROS: in good spirits, no complaints    Allergies  No Known Allergies    ANTIMICROBIALS:  piperacillin/tazobactam IVPB. 3.375 every 8 hours  vancomycin  IVPB 1000 every 12 hours    OTHER MEDS:  MEDICATIONS  (STANDING):  acetaminophen   Tablet .. 650 every 6 hours PRN  acetaminophen   Tablet .. 650 every 6 hours PRN  aspirin enteric coated 81 daily  bisacodyl Suppository 10 daily PRN  cilostazol 50 two times a day  clopidogrel Tablet 75 daily  dextrose 40% Gel 15 once PRN  dextrose 50% Injectable 12.5 once  dextrose 50% Injectable 25 once  dextrose 50% Injectable 25 once  docusate sodium 100 two times a day  glucagon  Injectable 1 once PRN  heparin  Injectable 5000 every 8 hours  insulin glargine Injectable (LANTUS) 12 at bedtime  insulin lispro (HumaLOG) corrective regimen sliding scale  three times a day before meals  insulin lispro Injectable (HumaLOG) 7 three times a day before meals  metoprolol succinate ER 25 daily  oxyCODONE    IR 5 every 6 hours PRN  oxyCODONE    IR 10 every 6 hours PRN  polyethylene glycol 3350 17 daily  ranolazine 500 two times a day  senna 2 at bedtime  simvastatin 80 at bedtime      Vital Signs Last 24 Hrs  T(C): 37.1 (21 Sep 2018 10:19), Max: 37.4 (20 Sep 2018 20:40)  T(F): 98.7 (21 Sep 2018 10:19), Max: 99.3 (20 Sep 2018 20:40)  HR: 101 (21 Sep 2018 11:10) (92 - 103)  BP: 107/72 (21 Sep 2018 11:10) (98/60 - 127/75)  BP(mean): --  RR: 18 (21 Sep 2018 11:10) (18 - 18)  SpO2: 97% (21 Sep 2018 11:10) (95% - 100%)    PHYSICAL EXAM:  General: WN/WD NAD, Non-toxic  Neurology: A&Ox3, nonfocal  Respiratory: Clear to auscultation bilaterally  CV: RRR, S1S2, no murmurs, rubs or gallops  Abdominal: Soft, Non-tender, non-distended, normal bowel sounds  Extremities: No edema, + peripheral pulses  Line Sites: Clear  Skin: No rash                     13.4   6.37  )-----------( 172      ( 21 Sep 2018 07:52 )             38.4       09-21    137  |  102  |  13  ----------------------------<  149<H>  3.9   |  26  |  1.10    Ca    9.6      21 Sep 2018 05:49  Phos  2.6     09-20  Mg     1.8     09-20    MICROBIOLOGY:  .Tissue deep wound left foot  09-19-18   Testing in progress  --    No polymorphonuclear cells seen  No organisms seen      .Abscess Wound  09-13-18   Moderate Corynebacterium species  "Susceptibilities not performed"  --  --      .Blood Blood  09-13-18   No growth at 5 days.  --  --      RADIOLOGY:   i reviewed image  < from: Xray Foot AP + Lateral + Oblique, Left (09.19.18 @ 09:35) >  IMPRESSION:  Patient is status post partial left second ray resection, with the   surgical border at the distal second metatarsal. Surgical margins are   sharp. Expected postoperative soft tissue changes. Vascular   calcifications.   Mild spurring at the dorsal midfoot. Tiny linear ossific density inferior   to the calcaneus, chronic.    < end of copied text >      Boris Jefferson MD; Division of Infectious Disease; Pager: [phone]; nights and weekends: [phone]
Follow Up:  s/p left toe amputation for OM    Interval History/ROS: pain last night, doing well today    Allergies  No Known Allergies      ANTIMICROBIALS:  piperacillin/tazobactam IVPB. 3.375 every 8 hours  vancomycin  IVPB 1000 every 12 hours    OTHER MEDS:  MEDICATIONS  (STANDING):  acetaminophen   Tablet .. 650 every 6 hours PRN  aspirin enteric coated 81 daily  bisacodyl Suppository 10 daily PRN  cilostazol 50 two times a day  clopidogrel Tablet 75 daily  dextrose 40% Gel 15 once PRN  dextrose 50% Injectable 12.5 once  dextrose 50% Injectable 25 once  dextrose 50% Injectable 25 once  docusate sodium 100 two times a day  glucagon  Injectable 1 once PRN  heparin  Injectable 5000 every 8 hours  influenza   Vaccine 0.5 once  insulin glargine Injectable (LANTUS) 12 at bedtime  insulin lispro (HumaLOG) corrective regimen sliding scale  three times a day before meals  insulin lispro Injectable (HumaLOG) 6 three times a day before meals  metoprolol succinate ER 25 daily  oxyCODONE    IR 5 every 6 hours PRN  oxyCODONE    IR 10 every 6 hours PRN  polyethylene glycol 3350 17 daily  ranolazine 500 two times a day  senna 2 at bedtime  simvastatin 80 at bedtime    Vital Signs Last 24 Hrs  T(C): 36.8 (20 Sep 2018 13:48), Max: 37.3 (20 Sep 2018 00:30)  T(F): 98.2 (20 Sep 2018 13:48), Max: 99.1 (20 Sep 2018 00:30)  HR: 100 (20 Sep 2018 13:48) (78 - 100)  BP: 122/69 (20 Sep 2018 13:48) (92/59 - 122/69)  BP(mean): --  RR: 18 (20 Sep 2018 13:48) (18 - 18)  SpO2: 97% (20 Sep 2018 13:48) (96% - 98%)    PHYSICAL EXAM:  General: WN/WD NAD, Non-toxic  Neurology: A&Ox3, nonfocal  Respiratory: Clear to auscultation bilaterally  CV: RRR, S1S2, no murmurs, rubs or gallops  Abdominal: Soft, Non-tender, non-distended  Extremities: No edema, dressing clean and dry  Line Sites: Clear  Skin: No rash                        13.4   7.20  )-----------( 186      ( 20 Sep 2018 07:43 )             39.5       09-20    138  |  104  |  14  ----------------------------<  162<H>  3.9   |  22  |  1.00    Ca    9.1      20 Sep 2018 07:11  Phos  2.6     09-20  Mg     1.8     09-20    MICROBIOLOGY:  .Tissue deep wound left foot  09-19-18   Testing in progress  --    No polymorphonuclear cells seen  No organisms seen      .Abscess Wound  09-13-18   Moderate Corynebacterium species  "Susceptibilities not performed"  --  --      .Blood Blood  09-13-18   No growth at 5 days.  --  --    Vancomycin Level, Trough: 10.2 ug/mL (09-19-18 @ 20:28)            RADIOLOGY:    Boris Jefferson MD; Division of Infectious Disease; Pager: [phone]; nights and weekends: [phone]
Follow Up:  toe osteo    Interval History/ROS: no complaints - when seen thsi am he was awaiting angiogram    Allergies  No Known Allergies    ANTIMICROBIALS:  piperacillin/tazobactam IVPB. 3.375 every 8 hours  vancomycin  IVPB 1000 every 12 hours    OTHER MEDS:  MEDICATIONS  (STANDING):  aspirin enteric coated 81 daily  cilostazol 50 two times a day  clopidogrel Tablet 75 daily  dextrose 40% Gel 15 once PRN  dextrose 50% Injectable 12.5 once  dextrose 50% Injectable 25 once  dextrose 50% Injectable 25 once  glucagon  Injectable 1 once PRN  heparin  Injectable 5000 every 8 hours  influenza   Vaccine 0.5 once  insulin glargine Injectable (LANTUS) 10 at bedtime  insulin lispro (HumaLOG) corrective regimen sliding scale  three times a day before meals  insulin lispro Injectable (HumaLOG) 6 three times a day before meals  metoprolol succinate ER 25 daily  ranolazine 500 two times a day  simvastatin 80 at bedtime    Vital Signs Last 24 Hrs  T(C): 36.5 (17 Sep 2018 15:30), Max: 36.9 (17 Sep 2018 06:15)  T(F): 97.7 (17 Sep 2018 15:30), Max: 98.4 (17 Sep 2018 06:15)  HR: 73 (17 Sep 2018 15:30) (70 - 87)  BP: 123/77 (17 Sep 2018 15:30) (113/69 - 123/77)  BP(mean): --  RR: 18 (17 Sep 2018 15:30) (18 - 18)  SpO2: 97% (17 Sep 2018 15:30) (95% - 98%)    PHYSICAL EXAM:  General: WN/WD NAD, Non-toxic  Neurology: A&Ox3, nonfocal  Respiratory: Clear to auscultation bilaterally  CV: RRR, S1S2,   Abdominal:non-distended  Extremities: No edema, dressing in place  Line Sites: Clear  Skin: No rash                        15.6   5.63  )-----------( 271      ( 17 Sep 2018 09:26 )             44.9       Sedimentation Rate, Erythrocyte (09.13.18 @ 14:31)    Sedimentation Rate, Erythrocyte: 16 mm/hr  C-Reactive Protein, Serum (09.13.18 @ 16:22)    C-Reactive Protein, Serum: 0.11 mg/dL    Hemoglobin A1C, Whole Blood (09.15.18 @ 08:27)    Hemoglobin A1C, Whole Blood: 7.1    09-17    132<L>  |  95<L>  |  22  ----------------------------<  232<H>  3.7   |  26  |  1.26    Ca    10.0      17 Sep 2018 07:04      MICROBIOLOGY:  .Abscess Wound  09-13-18   Moderate Corynebacterium species  "Susceptibilities not performed"  --  --      .Blood Blood  09-13-18   No growth to date.  --  --    RADIOLOGY:  < from: MR Foot w/wo IV Cont, Left (09.14.18 @ 21:14) >  Impression: Sinus tracts at the tip of the second digit with   osteomyelitis throughout the second distal and middle phalanges and at   the head of the second proximal phalanx. Marrow edema within the   remainder of the second proximal phalanx is favored to be reactive at   this time. Adjacent cellulitis and nonspecific myositis.    < end of copied text >      Boris Jefferson MD; Division of Infectious Disease; Pager: [phone]; nights and weekends: [phone]
Ibrahima Suarez MD  Pager [phone]  Spectra [phone]  Cell Phone [phone]    Patient is a 68y old  Male who presents with a chief complaint of left second toe wound (14 Sep 2018 14:50)        SUBJECTIVE / OVERNIGHT EVENTS: Patient feels well. Denies pain.       MEDICATIONS  (STANDING):  aspirin enteric coated 81 milliGRAM(s) Oral daily  cilostazol 50 milliGRAM(s) Oral two times a day  clopidogrel Tablet 75 milliGRAM(s) Oral daily  dextrose 5%. 1000 milliLiter(s) (50 mL/Hr) IV Continuous <Continuous>  dextrose 50% Injectable 12.5 Gram(s) IV Push once  dextrose 50% Injectable 25 Gram(s) IV Push once  dextrose 50% Injectable 25 Gram(s) IV Push once  insulin lispro (HumaLOG) corrective regimen sliding scale   SubCutaneous three times a day before meals  metoprolol succinate ER 25 milliGRAM(s) Oral daily  multivitamin 1 Tablet(s) Oral daily  piperacillin/tazobactam IVPB. 3.375 Gram(s) IV Intermittent every 8 hours  ranolazine 500 milliGRAM(s) Oral two times a day  simvastatin 80 milliGRAM(s) Oral at bedtime  vancomycin  IVPB 1000 milliGRAM(s) IV Intermittent every 12 hours    MEDICATIONS  (PRN):  dextrose 40% Gel 15 Gram(s) Oral once PRN Blood Glucose LESS THAN 70 milliGRAM(s)/deciliter  glucagon  Injectable 1 milliGRAM(s) IntraMuscular once PRN Glucose LESS THAN 70 milligrams/deciliter      Vital Signs Last 24 Hrs  T(C): 36.5 (14 Sep 2018 08:00), Max: 36.9 (14 Sep 2018 06:06)  T(F): 97.7 (14 Sep 2018 08:00), Max: 98.5 (14 Sep 2018 06:06)  HR: 75 (14 Sep 2018 08:00) (75 - 88)  BP: 115/76 (14 Sep 2018 08:00) (109/72 - 151/79)  BP(mean): 94 (13 Sep 2018 16:28) (94 - 94)  RR: 19 (14 Sep 2018 08:00) (16 - 19)  SpO2: 98% (14 Sep 2018 08:00) (97% - 98%)  CAPILLARY BLOOD GLUCOSE      POCT Blood Glucose.: 236 mg/dL (14 Sep 2018 13:08)  POCT Blood Glucose.: 131 mg/dL (13 Sep 2018 20:26)    I&O's Summary        PHYSICAL EXAM  GENERAL: NAD, well-developed  HEAD:  Atraumatic, Normocephalic  EYES: EOMI, PERRLA, conjunctiva and sclera clear  CHEST/LUNG: Clear to auscultation bilaterally; No wheeze  HEART: Regular rate and rhythm; No murmurs, rubs, or gallops  ABDOMEN: Soft, Nontender, Nondistended; Bowel sounds present  EXTREMITIES:  2+ Peripheral Pulses, No clubbing, cyanosis, or edema; L foot bandage in place, clean and dry.   PSYCH: AAOx3      LABS:                        14.3   7.0   )-----------( 356      ( 13 Sep 2018 14:31 )             42.6     09-13    137  |  100  |  22  ----------------------------<  216<H>  4.7   |  24  |  1.10    Ca    9.9      13 Sep 2018 14:31    TPro  7.9  /  Alb  4.3  /  TBili  0.3  /  DBili  x   /  AST  23  /  ALT  18  /  AlkPhos  75  09-13                RADIOLOGY & ADDITIONAL TESTS:    Consultant(s) Notes Reviewed: Podiatry, cardiology
Patient is a 68y old  Male who presents with a chief complaint of left second toe wound (13 Sep 2018 16:28)     INTERVAL HPI/OVERNIGHT EVENTS:  Patient seen and evaluated at bedside.  Pt is resting comfortable in NAD. Denies N/V/F/C.  Pain rated at 0/10    Allergies    No Known Allergies    Intolerances    Vital Signs Last 24 Hrs  T(C): 36.3 (13 Sep 2018 16:58), Max: 36.8 (13 Sep 2018 16:38)  T(F): 97.4 (13 Sep 2018 16:58), Max: 98.2 (13 Sep 2018 16:38)  HR: 83 (13 Sep 2018 16:58) (83 - 94)  BP: 127/69 (13 Sep 2018 16:58) (119/74 - 127/69)  BP(mean): 94 (13 Sep 2018 16:28) (94 - 94)  RR: 189 (13 Sep 2018 16:58) (18 - 189)  SpO2: 98% (13 Sep 2018 16:38) (97% - 98%)    LABS:                        14.3   7.0   )-----------( 356      ( 13 Sep 2018 14:31 )             42.6     09-13    137  |  100  |  22  ----------------------------<  216<H>  4.7   |  24  |  1.10    Ca    9.9      13 Sep 2018 14:31    TPro  7.9  /  Alb  4.3  /  TBili  0.3  /  DBili  x   /  AST  23  /  ALT  18  /  AlkPhos  75  09-13    CAPILLARY BLOOD GLUCOSE    Lower Extremity Physical Exam:  Vascular: DP/PT 1/4 B/L, CFT <3 sec x 10, Temp gradient warm to warm B/L  Neurology: Epicritic sensation diminished to level of ankle, B/L  Musculoskeletal/Ortho: No pain with palpation at any pedal sites.   Skin:   Wound #1: LEFT foot  Location: 2nd digit   Size: Ulceration at distal tip of digit as well as the DIPJ. ulceration 0.3 x 0.4   Etiology: Pressure  Depth: down to distal phalanx   Wound bed: necrotic   Drainage: none   Odor: none     Dactylitis of the digit with associated necrotic distal digit. Exposed DIPJ joint through adjacent ulceration. No purulent drainage, malodor, Erythema localized to distal digit to the PIPJ.     RADIOLOGY & ADDITIONAL TESTS:  < from: Xray Foot AP + Lateral + Oblique, Left (09.13.18 @ 15:01) >    EXAM:  FOOT COMPLETE LEFT (MIN 3 VIEWS)                            PROCEDURE DATE:  09/13/2018            INTERPRETATION:  CLINICAL INFORMATION: Left second toe wound, evaluate   for osteomyelitis.    EXAM: 3 views of the left  foot with no prior comparisons.    FINDINGS:  There is erosion of the second distal phalanx with only a portion of the   base remaining, which is dorsally displaced. Ulcer is noted at the tip of   the second digit with surrounding soft tissue swelling.    The remaining visualized joint spaces are unremarkable. Vascular   calcifications noted.    IMPRESSION:  There is erosion of the second distal phalanx with only a portion of the   base remaining, which is dorsally displaced. Findings are compatible with   osteomyelitis.     Ulcer is noted at the tip of the second digit with surrounding soft   tissue swelling.                    LANDRY KWON M.D., RADIOLOGY RESIDENT  This document has been electronically signed.  NALINI DAVIS M.D., ATTENDING RADIOLOGIST  This document has been electronically signed. Sep 13 2018  5:05PM              < end of copied text >
Patient is a 68y old  Male who presents with a chief complaint of left second toe wound (14 Sep 2018 15:32)       INTERVAL HPI/OVERNIGHT EVENTS:  Patient seen and evaluated at bedside.  Pt is resting comfortable in NAD. Denies N/V/F/C.  Pain rated at X/10    Allergies    No Known Allergies    Intolerances        Vital Signs Last 24 Hrs  T(C): 36.5 (14 Sep 2018 08:00), Max: 36.9 (14 Sep 2018 06:06)  T(F): 97.7 (14 Sep 2018 08:00), Max: 98.5 (14 Sep 2018 06:06)  HR: 75 (14 Sep 2018 08:00) (75 - 85)  BP: 115/76 (14 Sep 2018 08:00) (109/72 - 151/79)  BP(mean): --  RR: 19 (14 Sep 2018 08:00) (16 - 19)  SpO2: 98% (14 Sep 2018 08:00) (97% - 98%)    LABS:                        14.3   7.0   )-----------( 356      ( 13 Sep 2018 14:31 )             42.6     09-13    137  |  100  |  22  ----------------------------<  216<H>  4.7   |  24  |  1.10    Ca    9.9      13 Sep 2018 14:31    TPro  7.9  /  Alb  4.3  /  TBili  0.3  /  DBili  x   /  AST  23  /  ALT  18  /  AlkPhos  75  09-13        CAPILLARY BLOOD GLUCOSE      POCT Blood Glucose.: 124 mg/dL (14 Sep 2018 18:21)  POCT Blood Glucose.: 236 mg/dL (14 Sep 2018 13:08)  POCT Blood Glucose.: 131 mg/dL (13 Sep 2018 20:26)      Lower Extremity Physical Exam:  Vascular: DP/PT 1/4 B/L, CFT <3 sec x 10, Temp gradient warm to warm B/L  Neurology: Epicritic sensation diminished to level of ankle, B/L  Musculoskeletal/Ortho: No pain with palpation at any pedal sites.   Skin:   Wound #1: LEFT foot  Location: 2nd digit   Size: Ulceration at distal tip of digit as well as the DIPJ. ulceration 0.3 x 0.4   Etiology: Pressure  Depth: down to distal phalanx   Wound bed: necrotic   Drainage: none   Odor: none     Dactylitis of the digit with associated necrotic distal digit. Exposed DIPJ joint through adjacent ulceration. No purulent drainage, malodor, Erythema localized to distal digit to the PIPJ.     RADIOLOGY & ADDITIONAL TESTS:
Patient is a 68y old  Male who presents with a chief complaint of left second toe wound (15 Sep 2018 03:00)      SUBJECTIVE / OVERNIGHT EVENTS:  Pt seen and examined at bedside. No acute events overnight. Pt denies symptoms of cp, sob, N/V, fever, chills, abd pain.   ROS:  All other review of systems negative    Allergies    No Known Allergies    Intolerances        MEDICATIONS  (STANDING):  aspirin enteric coated 81 milliGRAM(s) Oral daily  cilostazol 50 milliGRAM(s) Oral two times a day  clopidogrel Tablet 75 milliGRAM(s) Oral daily  dextrose 5%. 1000 milliLiter(s) (50 mL/Hr) IV Continuous <Continuous>  dextrose 50% Injectable 12.5 Gram(s) IV Push once  dextrose 50% Injectable 25 Gram(s) IV Push once  dextrose 50% Injectable 25 Gram(s) IV Push once  influenza   Vaccine 0.5 milliLiter(s) IntraMuscular once  insulin lispro (HumaLOG) corrective regimen sliding scale   SubCutaneous three times a day before meals  metoprolol succinate ER 25 milliGRAM(s) Oral daily  multivitamin 1 Tablet(s) Oral daily  piperacillin/tazobactam IVPB. 3.375 Gram(s) IV Intermittent every 8 hours  ranolazine 500 milliGRAM(s) Oral two times a day  simvastatin 80 milliGRAM(s) Oral at bedtime  vancomycin  IVPB 1000 milliGRAM(s) IV Intermittent every 12 hours    MEDICATIONS  (PRN):  dextrose 40% Gel 15 Gram(s) Oral once PRN Blood Glucose LESS THAN 70 milliGRAM(s)/deciliter  glucagon  Injectable 1 milliGRAM(s) IntraMuscular once PRN Glucose LESS THAN 70 milligrams/deciliter      Vital Signs Last 24 Hrs  T(C): 36.8 (15 Sep 2018 09:03), Max: 36.8 (15 Sep 2018 09:03)  T(F): 98.2 (15 Sep 2018 09:03), Max: 98.2 (15 Sep 2018 09:03)  HR: 85 (15 Sep 2018 09:03) (80 - 85)  BP: 108/70 (15 Sep 2018 09:03) (108/67 - 119/78)  BP(mean): --  RR: 18 (15 Sep 2018 09:03) (18 - 18)  SpO2: 96% (15 Sep 2018 09:03) (96% - 98%)  CAPILLARY BLOOD GLUCOSE      POCT Blood Glucose.: 177 mg/dL (15 Sep 2018 08:28)  POCT Blood Glucose.: 177 mg/dL (14 Sep 2018 22:12)  POCT Blood Glucose.: 124 mg/dL (14 Sep 2018 18:21)  POCT Blood Glucose.: 236 mg/dL (14 Sep 2018 13:08)    I&O's Summary    14 Sep 2018 07:01  -  15 Sep 2018 07:00  --------------------------------------------------------  IN: 950 mL / OUT: 1 mL / NET: 949 mL        PHYSICAL EXAM:  GENERAL: NAD, well-developed  HEAD:  Atraumatic, Normocephalic  EYES: EOMI, PERRLA, conjunctiva and sclera clear  CHEST/LUNG: Clear to auscultation bilaterally; No wheeze  HEART: Regular rate and rhythm; No murmurs, rubs, or gallops  ABDOMEN: Soft, Nontender, Nondistended; Bowel sounds present  EXTREMITIES:  2+ Peripheral Pulses, No clubbing, cyanosis, or edema; L foot 2nd toe bandage in place, clean and dry.   PSYCH: AAOx3    LABS:                        14.3   7.0   )-----------( 356      ( 13 Sep 2018 14:31 )             42.6     09-13    137  |  100  |  22  ----------------------------<  216<H>  4.7   |  24  |  1.10    Ca    9.9      13 Sep 2018 14:31    TPro  7.9  /  Alb  4.3  /  TBili  0.3  /  DBili  x   /  AST  23  /  ALT  18  /  AlkPhos  75  09-13              RADIOLOGY & ADDITIONAL TESTS:    Imaging Personally Reviewed:    Consultant(s) Notes Reviewed:      Care Discussed with Consultants/Other Providers:    Case Discussed with Family:    Goals of Care:
Patient is a 68y old  Male who presents with a chief complaint of left second toe wound (16 Sep 2018 04:56)      SUBJECTIVE / OVERNIGHT EVENTS:  Pt seen and examined at bedside. No acute events overnight. Pt denies symptoms of cp, sob, N/V, fever, chills, abd pain.     ROS:  All other review of systems negative    Allergies    No Known Allergies    Intolerances        MEDICATIONS  (STANDING):  aspirin enteric coated 81 milliGRAM(s) Oral daily  cilostazol 50 milliGRAM(s) Oral two times a day  clopidogrel Tablet 75 milliGRAM(s) Oral daily  dextrose 5%. 1000 milliLiter(s) (50 mL/Hr) IV Continuous <Continuous>  dextrose 50% Injectable 12.5 Gram(s) IV Push once  dextrose 50% Injectable 25 Gram(s) IV Push once  dextrose 50% Injectable 25 Gram(s) IV Push once  heparin  Injectable 5000 Unit(s) SubCutaneous every 8 hours  influenza   Vaccine 0.5 milliLiter(s) IntraMuscular once  insulin glargine Injectable (LANTUS) 10 Unit(s) SubCutaneous at bedtime  insulin lispro (HumaLOG) corrective regimen sliding scale   SubCutaneous three times a day before meals  insulin lispro Injectable (HumaLOG) 6 Unit(s) SubCutaneous three times a day before meals  metoprolol succinate ER 25 milliGRAM(s) Oral daily  multivitamin 1 Tablet(s) Oral daily  piperacillin/tazobactam IVPB. 3.375 Gram(s) IV Intermittent every 8 hours  ranolazine 500 milliGRAM(s) Oral two times a day  simvastatin 80 milliGRAM(s) Oral at bedtime  vancomycin  IVPB 1000 milliGRAM(s) IV Intermittent every 12 hours    MEDICATIONS  (PRN):  dextrose 40% Gel 15 Gram(s) Oral once PRN Blood Glucose LESS THAN 70 milliGRAM(s)/deciliter  glucagon  Injectable 1 milliGRAM(s) IntraMuscular once PRN Glucose LESS THAN 70 milligrams/deciliter      Vital Signs Last 24 Hrs  T(C): 36.4 (16 Sep 2018 06:25), Max: 36.6 (15 Sep 2018 20:54)  T(F): 97.6 (16 Sep 2018 06:25), Max: 97.9 (15 Sep 2018 20:54)  HR: 69 (16 Sep 2018 06:25) (69 - 83)  BP: 123/73 (16 Sep 2018 06:25) (100/66 - 123/73)  BP(mean): --  RR: 18 (16 Sep 2018 06:25) (18 - 18)  SpO2: 95% (16 Sep 2018 06:25) (95% - 96%)  CAPILLARY BLOOD GLUCOSE      POCT Blood Glucose.: 233 mg/dL (16 Sep 2018 08:18)  POCT Blood Glucose.: 183 mg/dL (15 Sep 2018 22:27)  POCT Blood Glucose.: 266 mg/dL (15 Sep 2018 17:25)  POCT Blood Glucose.: 216 mg/dL (15 Sep 2018 12:21)    I&O's Summary    15 Sep 2018 07:01  -  16 Sep 2018 07:00  --------------------------------------------------------  IN: 770 mL / OUT: 0 mL / NET: 770 mL        PHYSICAL EXAM:  GENERAL: NAD, well-developed  HEAD:  Atraumatic, Normocephalic  EYES: EOMI, PERRLA, conjunctiva and sclera clear  CHEST/LUNG: Clear to auscultation bilaterally; No wheeze  HEART: Regular rate and rhythm; No murmurs, rubs, or gallops  ABDOMEN: Soft, Nontender, Nondistended; Bowel sounds present  EXTREMITIES:  2+ Peripheral Pulses, No clubbing, cyanosis, or edema; L foot 2nd toe swelling with erythema and distal eschar noted.   PSYCH: AAOx3    LABS:                        14.6   5.85  )-----------( 261      ( 16 Sep 2018 09:15 )             43.3     09-16    138  |  102  |  20  ----------------------------<  215<H>  4.5   |  26  |  1.10    Ca    9.4      16 Sep 2018 07:16
Patient is a 68y old  Male who presents with a chief complaint of left second toe wound (16 Sep 2018 12:56)      Vascular Surgery Attending Progress Note    Interval HPI: pt w/o c/o pt wants to proceed w lle angio     Medications:  aspirin enteric coated 81 milliGRAM(s) Oral daily  cilostazol 50 milliGRAM(s) Oral two times a day  clopidogrel Tablet 75 milliGRAM(s) Oral daily  dextrose 40% Gel 15 Gram(s) Oral once PRN  dextrose 5%. 1000 milliLiter(s) IV Continuous <Continuous>  dextrose 50% Injectable 12.5 Gram(s) IV Push once  dextrose 50% Injectable 25 Gram(s) IV Push once  dextrose 50% Injectable 25 Gram(s) IV Push once  glucagon  Injectable 1 milliGRAM(s) IntraMuscular once PRN  heparin  Injectable 5000 Unit(s) SubCutaneous every 8 hours  influenza   Vaccine 0.5 milliLiter(s) IntraMuscular once  insulin glargine Injectable (LANTUS) 10 Unit(s) SubCutaneous at bedtime  insulin lispro (HumaLOG) corrective regimen sliding scale   SubCutaneous three times a day before meals  insulin lispro Injectable (HumaLOG) 6 Unit(s) SubCutaneous three times a day before meals  metoprolol succinate ER 25 milliGRAM(s) Oral daily  multivitamin 1 Tablet(s) Oral daily  piperacillin/tazobactam IVPB. 3.375 Gram(s) IV Intermittent every 8 hours  ranolazine 500 milliGRAM(s) Oral two times a day  simvastatin 80 milliGRAM(s) Oral at bedtime  vancomycin  IVPB 1000 milliGRAM(s) IV Intermittent every 12 hours      Vital Signs Last 24 Hrs  T(C): 36.9 (16 Sep 2018 12:09), Max: 36.9 (16 Sep 2018 12:09)  T(F): 98.4 (16 Sep 2018 12:09), Max: 98.4 (16 Sep 2018 12:09)  HR: 76 (16 Sep 2018 12:09) (69 - 83)  BP: 109/69 (16 Sep 2018 12:09) (100/66 - 123/73)  BP(mean): --  RR: 18 (16 Sep 2018 12:09) (18 - 18)  SpO2: 98% (16 Sep 2018 12:09) (95% - 98%)  I&O's Summary    15 Sep 2018 07:01  -  16 Sep 2018 07:00  --------------------------------------------------------  IN: 770 mL / OUT: 0 mL / NET: 770 mL        Physical Exam:  Neuro  A&Ox3 VSS  Vascular:  lt toe wound stable     LABS:                        14.6   5.85  )-----------( 261      ( 16 Sep 2018 09:15 )             43.3     09-16    138  |  102  |  20  ----------------------------<  215<H>  4.5   |  26  |  1.10    Ca    9.4      16 Sep 2018 07:16          JORDAN RAUSCH MD  [phone]
Patient is a 68y old  Male who presents with a chief complaint of left second toe wound (17 Sep 2018 05:35)       INTERVAL HPI/OVERNIGHT EVENTS:  Patient seen and evaluated at bedside.  Pt is resting comfortable in NAD. Denies N/V/F/C.     Allergies    No Known Allergies    Intolerances        Vital Signs Last 24 Hrs  T(C): 36.9 (17 Sep 2018 06:15), Max: 36.9 (16 Sep 2018 12:09)  T(F): 98.4 (17 Sep 2018 06:15), Max: 98.4 (16 Sep 2018 12:09)  HR: 70 (17 Sep 2018 06:15) (70 - 87)  BP: 113/69 (17 Sep 2018 06:15) (109/69 - 121/77)  BP(mean): --  RR: 18 (17 Sep 2018 06:15) (18 - 18)  SpO2: 95% (17 Sep 2018 06:15) (95% - 98%)    LABS:                        14.6   5.85  )-----------( 261      ( 16 Sep 2018 09:15 )             43.3     09-17    132<L>  |  95<L>  |  22  ----------------------------<  232<H>  3.7   |  26  |  1.26    Ca    10.0      17 Sep 2018 07:04          CAPILLARY BLOOD GLUCOSE      POCT Blood Glucose.: 171 mg/dL (16 Sep 2018 22:00)  POCT Blood Glucose.: 178 mg/dL (16 Sep 2018 17:17)  POCT Blood Glucose.: 194 mg/dL (16 Sep 2018 12:26)      Lower Extremity Physical Exam:  Vascular: DP/PT 1/4 B/L, CFT <3 sec x 10, Temp gradient warm to warm B/L  Neurology: Epicritic sensation diminished to level of ankle, B/L  Musculoskeletal/Ortho: No pain with palpation at any pedal sites.   Skin:   Wound #1: LEFT foot  Location: 2nd digit   Size: Ulceration at distal tip of digit as well as the DIPJ. ulceration 0.3 x 0.4   Etiology: Pressure  Depth: down to distal phalanx   Wound bed: necrotic   Drainage: none   Odor: none     Dactylitis of the digit with associated necrotic distal digit. Exposed DIPJ joint through adjacent ulceration. No purulent drainage, malodor, Erythema localized to distal digit to the PIPJ.     RADIOLOGY & ADDITIONAL TESTS:  < from: MR Foot w/wo IV Cont, Left (09.14.18 @ 21:14) >    EXAM:  MR FOOT WAW IC LT                            PROCEDURE DATE:  09/14/2018            INTERPRETATION:  Clinical Information: Second digit ostomy myelitis.    Comparison: Radiograph the left foot from 9/13/2018.    Technique: MRI of the left forefoot was performed utilizing multiplanar   imaging before and after the administration gadolinium based contrast.    Findings:     There is hypointense T1 marrow signal within the head of the second   proximal phalanx, and the entire second middle and distal phalanges   consistent with osteomyelitis. Destruction at the second distal phalanx   is again noted. There is a distal dorsal ulceration again noted. There   are associated dorsal and distal sinus tracts which extend down to the   second distal and middle phalanges. There is adjacent edema and   enhancement consistent with cellulitis. There is mild marrow edema within   the remainder of the second proximal phalanx which is favored to be   reactive at this time.    There is mild intramuscular edema and enhancement around the second   metatarsal. There is small fluid around the flexor hallucis longus tendon   proximally. There is minimal signal within the plantar aspect of the   flexor hallucis longus tendon at the level the mid foot.    Impression: Sinus tracts at the tip of the second digit with   osteomyelitis throughout the second distal and middle phalanges and at   the head of the second proximal phalanx. Marrow edema within the   remainder of the second proximal phalanx is favored to be reactive at   this time. Adjacent cellulitis and nonspecific myositis.                      MIKE WU M.D., ATTENDING RADIOLOGIST  This document has been electronically signed. Sep 15 2018 12:18PM                < end of copied text >
Patient is a 68y old  Male who presents with a chief complaint of left second toe wound (18 Sep 2018 08:09)      SUBJECTIVE / OVERNIGHT EVENTS: Reports pain in left foot is better, has not had a bm in 2 days, no cp, sob, chills, n/v    MEDICATIONS  (STANDING):  aspirin enteric coated 81 milliGRAM(s) Oral daily  cilostazol 50 milliGRAM(s) Oral two times a day  clopidogrel Tablet 75 milliGRAM(s) Oral daily  dextrose 5%. 1000 milliLiter(s) (50 mL/Hr) IV Continuous <Continuous>  dextrose 50% Injectable 12.5 Gram(s) IV Push once  dextrose 50% Injectable 25 Gram(s) IV Push once  dextrose 50% Injectable 25 Gram(s) IV Push once  docusate sodium 100 milliGRAM(s) Oral two times a day  heparin  Injectable 5000 Unit(s) SubCutaneous every 8 hours  influenza   Vaccine 0.5 milliLiter(s) IntraMuscular once  insulin glargine Injectable (LANTUS) 12 Unit(s) SubCutaneous at bedtime  insulin lispro (HumaLOG) corrective regimen sliding scale   SubCutaneous three times a day before meals  insulin lispro Injectable (HumaLOG) 6 Unit(s) SubCutaneous three times a day before meals  lactobacillus acidophilus 1 Tablet(s) Oral three times a day with meals  metoprolol succinate ER 25 milliGRAM(s) Oral daily  multivitamin 1 Tablet(s) Oral daily  piperacillin/tazobactam IVPB. 3.375 Gram(s) IV Intermittent every 8 hours  polyethylene glycol 3350 17 Gram(s) Oral daily  ranolazine 500 milliGRAM(s) Oral two times a day  senna 2 Tablet(s) Oral at bedtime  simvastatin 80 milliGRAM(s) Oral at bedtime  sodium chloride 0.9%. 1000 milliLiter(s) (50 mL/Hr) IV Continuous <Continuous>  vancomycin  IVPB 1000 milliGRAM(s) IV Intermittent every 12 hours    MEDICATIONS  (PRN):  dextrose 40% Gel 15 Gram(s) Oral once PRN Blood Glucose LESS THAN 70 milliGRAM(s)/deciliter  glucagon  Injectable 1 milliGRAM(s) IntraMuscular once PRN Glucose LESS THAN 70 milligrams/deciliter        CAPILLARY BLOOD GLUCOSE      POCT Blood Glucose.: 286 mg/dL (18 Sep 2018 13:23)  POCT Blood Glucose.: 219 mg/dL (18 Sep 2018 08:17)  POCT Blood Glucose.: 213 mg/dL (18 Sep 2018 07:16)  POCT Blood Glucose.: 178 mg/dL (17 Sep 2018 22:26)  POCT Blood Glucose.: 200 mg/dL (17 Sep 2018 17:32)    I&O's Summary    17 Sep 2018 07:  -  18 Sep 2018 07:00  --------------------------------------------------------  IN: 200 mL / OUT: 0 mL / NET: 200 mL    18 Sep 2018 07:  -  18 Sep 2018 13:30  --------------------------------------------------------  IN: 240 mL / OUT: 0 mL / NET: 240 mL        PHYSICAL EXAM:  GENERAL: NAD, well-developed  HEAD:  Atraumatic, Normocephalic  EYES: EOMI, PERRLA, conjunctiva and sclera clear  NECK: No JVD  CHEST/LUNG: Clear to auscultation bilaterally; No wheeze  HEART: Regular rate and rhythm; S1S2  ABDOMEN: Soft, Nontender, Nondistended; Bowel sounds present  EXTREMITIES:  2+ Peripheral Pulses, No clubbing, cyanosis, or edema RLE, LLE dressing in place   PSYCH: AAOx3  NEUROLOGY: non-focal      LABS:                        14.8   4.67  )-----------( 239      ( 18 Sep 2018 09:06 )             43.6     -18    136  |  100  |  20  ----------------------------<  218<H>  4.4   |  26  |  1.22    Ca    9.6      18 Sep 2018 07:24  Phos  2.8       Mg     2.0           PT/INR - ( 17 Sep 2018 09:26 )   PT: 11.5 sec;   INR: 1.02 ratio         PTT - ( 17 Sep 2018 09:26 )  PTT:28.5 sec      Urinalysis Basic - ( 18 Sep 2018 09:09 )    Color: Yellow / Appearance: Clear / S.024 / pH: x  Gluc: x / Ketone: Trace  / Bili: Negative / Urobili: Negative mg/dL   Blood: x / Protein: Negative mg/dL / Nitrite: Negative   Leuk Esterase: Negative / RBC: x / WBC x   Sq Epi: x / Non Sq Epi: x / Bacteria: x        RADIOLOGY & ADDITIONAL TESTS:    Imaging Personally Reviewed:    Consultant(s) Notes Reviewed:  podiatry     Care Discussed with Consultants/Other Providers:
Patient is a 68y old  Male who presents with a chief complaint of left second toe wound (18 Sep 2018 16:53)      Vascular Surgery Attending Progress Note    Interval HPI: pt w/o c/o     Medications:  aspirin enteric coated 81 milliGRAM(s) Oral daily  cilostazol 50 milliGRAM(s) Oral two times a day  clopidogrel Tablet 75 milliGRAM(s) Oral daily  dextrose 40% Gel 15 Gram(s) Oral once PRN  dextrose 5%. 1000 milliLiter(s) IV Continuous <Continuous>  dextrose 50% Injectable 12.5 Gram(s) IV Push once  dextrose 50% Injectable 25 Gram(s) IV Push once  dextrose 50% Injectable 25 Gram(s) IV Push once  docusate sodium 100 milliGRAM(s) Oral two times a day  glucagon  Injectable 1 milliGRAM(s) IntraMuscular once PRN  heparin  Injectable 5000 Unit(s) SubCutaneous every 8 hours  influenza   Vaccine 0.5 milliLiter(s) IntraMuscular once  insulin glargine Injectable (LANTUS) 12 Unit(s) SubCutaneous at bedtime  insulin lispro (HumaLOG) corrective regimen sliding scale   SubCutaneous three times a day before meals  insulin lispro Injectable (HumaLOG) 6 Unit(s) SubCutaneous three times a day before meals  lactobacillus acidophilus 1 Tablet(s) Oral three times a day with meals  metoprolol succinate ER 25 milliGRAM(s) Oral daily  multivitamin 1 Tablet(s) Oral daily  piperacillin/tazobactam IVPB. 3.375 Gram(s) IV Intermittent every 8 hours  polyethylene glycol 3350 17 Gram(s) Oral daily  ranolazine 500 milliGRAM(s) Oral two times a day  senna 2 Tablet(s) Oral at bedtime  simvastatin 80 milliGRAM(s) Oral at bedtime  sodium chloride 0.9%. 1000 milliLiter(s) IV Continuous <Continuous>  vancomycin  IVPB 1000 milliGRAM(s) IV Intermittent every 12 hours      Vital Signs Last 24 Hrs  T(C): 36.2 (18 Sep 2018 17:46), Max: 36.8 (18 Sep 2018 06:08)  T(F): 97.2 (18 Sep 2018 17:46), Max: 98.3 (18 Sep 2018 06:08)  HR: 89 (18 Sep 2018 17:46) (72 - 96)  BP: 123/80 (18 Sep 2018 17:46) (113/62 - 135/76)  BP(mean): --  RR: 18 (18 Sep 2018 17:46) (17 - 18)  SpO2: 99% (18 Sep 2018 17:46) (95% - 99%)  I&O's Summary    17 Sep 2018 07:01  -  18 Sep 2018 07:00  --------------------------------------------------------  IN: 200 mL / OUT: 0 mL / NET: 200 mL    18 Sep 2018 07:01  -  18 Sep 2018 21:19  --------------------------------------------------------  IN: 690 mL / OUT: 0 mL / NET: 690 mL        Physical Exam:  Neuro  A&Ox3 VSS  Vascular: left toe 2 ischemia stable w no healing      LABS:                        14.8   4.67  )-----------( 239      ( 18 Sep 2018 09:06 )             43.6     09-18    136  |  100  |  20  ----------------------------<  218<H>  4.4   |  26  |  1.22    Ca    9.6      18 Sep 2018 07:24  Phos  2.8     09-18  Mg     2.0     09-18      PT/INR - ( 17 Sep 2018 09:26 )   PT: 11.5 sec;   INR: 1.02 ratio         PTT - ( 17 Sep 2018 09:26 )  PTT:28.5 sec    JORDAN RAUSCH MD  [phone]
Patient is a 68y old  Male who presents with a chief complaint of left second toe wound (19 Sep 2018 12:49)      Vascular Surgery Attending Progress Note    Interval HPI: pt w/o c/o     Medications:  acetaminophen   Tablet .. 650 milliGRAM(s) Oral every 6 hours PRN  aspirin enteric coated 81 milliGRAM(s) Oral daily  cilostazol 50 milliGRAM(s) Oral two times a day  clopidogrel Tablet 75 milliGRAM(s) Oral daily  dextrose 40% Gel 15 Gram(s) Oral once PRN  dextrose 5%. 1000 milliLiter(s) IV Continuous <Continuous>  dextrose 50% Injectable 12.5 Gram(s) IV Push once  dextrose 50% Injectable 25 Gram(s) IV Push once  dextrose 50% Injectable 25 Gram(s) IV Push once  docusate sodium 100 milliGRAM(s) Oral two times a day  glucagon  Injectable 1 milliGRAM(s) IntraMuscular once PRN  heparin  Injectable 5000 Unit(s) SubCutaneous every 8 hours  influenza   Vaccine 0.5 milliLiter(s) IntraMuscular once  insulin glargine Injectable (LANTUS) 12 Unit(s) SubCutaneous at bedtime  insulin lispro (HumaLOG) corrective regimen sliding scale   SubCutaneous three times a day before meals  insulin lispro Injectable (HumaLOG) 6 Unit(s) SubCutaneous three times a day before meals  lactobacillus acidophilus 1 Tablet(s) Oral three times a day with meals  metoprolol succinate ER 25 milliGRAM(s) Oral daily  multivitamin 1 Tablet(s) Oral daily  oxyCODONE    5 mG/acetaminophen 325 mG 1 Tablet(s) Oral every 6 hours PRN  piperacillin/tazobactam IVPB. 3.375 Gram(s) IV Intermittent every 8 hours  polyethylene glycol 3350 17 Gram(s) Oral daily  ranolazine 500 milliGRAM(s) Oral two times a day  senna 2 Tablet(s) Oral at bedtime  simvastatin 80 milliGRAM(s) Oral at bedtime  sodium chloride 0.9%. 1000 milliLiter(s) IV Continuous <Continuous>  vancomycin  IVPB 1000 milliGRAM(s) IV Intermittent every 12 hours      Vital Signs Last 24 Hrs  T(C): 36.4 (19 Sep 2018 12:00), Max: 36.7 (19 Sep 2018 06:08)  T(F): 97.5 (19 Sep 2018 12:00), Max: 98.1 (19 Sep 2018 06:08)  HR: 76 (19 Sep 2018 12:00) (60 - 89)  BP: 119/70 (19 Sep 2018 12:00) (104/59 - 123/80)  BP(mean): 77 (19 Sep 2018 10:15) (76 - 88)  RR: 18 (19 Sep 2018 12:00) (14 - 18)  SpO2: 97% (19 Sep 2018 12:00) (95% - 100%)  I&O's Summary    18 Sep 2018 07:01  -  19 Sep 2018 07:00  --------------------------------------------------------  IN: 930 mL / OUT: 0 mL / NET: 930 mL        Physical Exam:  Neuro  A&Ox3 VSS  Vascular: stable dressing c/d/i     LABS:                        13.7   4.07  )-----------( 207      ( 19 Sep 2018 08:00 )             40.8     09-19    138  |  101  |  19  ----------------------------<  184<H>  4.3   |  24  |  1.08    Ca    9.1      19 Sep 2018 07:00  Phos  2.8     09-18  Mg     2.0     09-18      PT/INR - ( 19 Sep 2018 08:02 )   PT: 12.6 sec;   INR: 1.11 ratio         PTT - ( 19 Sep 2018 08:02 )  PTT:27.1 sec    JORDAN RAUSCH MD  [phone]
Patient is a 68y old  Male who presents with a chief complaint of left second toe wound (20 Sep 2018 06:24)      Vascular Surgery Attending Progress Note    Interval HPI: pt w/o c/o  s/p toe amp     Medications:  acetaminophen   Tablet .. 650 milliGRAM(s) Oral every 6 hours PRN  aspirin enteric coated 81 milliGRAM(s) Oral daily  bisacodyl Suppository 10 milliGRAM(s) Rectal daily PRN  cilostazol 50 milliGRAM(s) Oral two times a day  clopidogrel Tablet 75 milliGRAM(s) Oral daily  dextrose 40% Gel 15 Gram(s) Oral once PRN  dextrose 5%. 1000 milliLiter(s) IV Continuous <Continuous>  dextrose 50% Injectable 12.5 Gram(s) IV Push once  dextrose 50% Injectable 25 Gram(s) IV Push once  dextrose 50% Injectable 25 Gram(s) IV Push once  docusate sodium 100 milliGRAM(s) Oral two times a day  glucagon  Injectable 1 milliGRAM(s) IntraMuscular once PRN  heparin  Injectable 5000 Unit(s) SubCutaneous every 8 hours  influenza   Vaccine 0.5 milliLiter(s) IntraMuscular once  insulin glargine Injectable (LANTUS) 12 Unit(s) SubCutaneous at bedtime  insulin lispro (HumaLOG) corrective regimen sliding scale   SubCutaneous three times a day before meals  insulin lispro Injectable (HumaLOG) 6 Unit(s) SubCutaneous three times a day before meals  lactobacillus acidophilus 1 Tablet(s) Oral three times a day with meals  metoprolol succinate ER 25 milliGRAM(s) Oral daily  multivitamin 1 Tablet(s) Oral daily  oxyCODONE    5 mG/acetaminophen 325 mG 1 Tablet(s) Oral every 6 hours PRN  piperacillin/tazobactam IVPB. 3.375 Gram(s) IV Intermittent every 8 hours  polyethylene glycol 3350 17 Gram(s) Oral daily  ranolazine 500 milliGRAM(s) Oral two times a day  senna 2 Tablet(s) Oral at bedtime  simvastatin 80 milliGRAM(s) Oral at bedtime  sodium chloride 0.9%. 1000 milliLiter(s) IV Continuous <Continuous>  vancomycin  IVPB 1000 milliGRAM(s) IV Intermittent every 12 hours      Vital Signs Last 24 Hrs  T(C): 36.8 (20 Sep 2018 13:48), Max: 37.3 (20 Sep 2018 00:30)  T(F): 98.2 (20 Sep 2018 13:48), Max: 99.1 (20 Sep 2018 00:30)  HR: 100 (20 Sep 2018 13:48) (78 - 100)  BP: 122/69 (20 Sep 2018 13:48) (92/59 - 122/69)  BP(mean): --  RR: 18 (20 Sep 2018 13:48) (18 - 18)  SpO2: 97% (20 Sep 2018 13:48) (96% - 98%)  I&O's Summary    19 Sep 2018 07:01  -  20 Sep 2018 07:00  --------------------------------------------------------  IN: 1870 mL / OUT: 1600 mL / NET: 270 mL    20 Sep 2018 07:01  -  20 Sep 2018 14:30  --------------------------------------------------------  IN: 980 mL / OUT: 500 mL / NET: 480 mL      Physical Exam:  Neuro  A&Ox3 VSS  Vascular:  stable dressing c/d/i    LABS:                        13.4   7.20  )-----------( 186      ( 20 Sep 2018 07:43 )             39.5     09-20    138  |  104  |  14  ----------------------------<  162<H>  3.9   |  22  |  1.00    Ca    9.1      20 Sep 2018 07:11  Phos  2.6     09-20  Mg     1.8     09-20      PT/INR - ( 19 Sep 2018 08:02 )   PT: 12.6 sec;   INR: 1.11 ratio         PTT - ( 19 Sep 2018 08:02 )  PTT:27.1 sec    JORDAN RAUSCH MD  [phone]
Patient is a 68y old  Male who presents with a chief complaint of left second toe wound (20 Sep 2018 14:30)      SUBJECTIVE / OVERNIGHT EVENTS: Reports some pain in left foot, has not moved bowels in 2 days, denies cp, sob, abdominal pain, chills     MEDICATIONS  (STANDING):  aspirin enteric coated 81 milliGRAM(s) Oral daily  cilostazol 50 milliGRAM(s) Oral two times a day  clopidogrel Tablet 75 milliGRAM(s) Oral daily  dextrose 5%. 1000 milliLiter(s) (50 mL/Hr) IV Continuous <Continuous>  dextrose 50% Injectable 12.5 Gram(s) IV Push once  dextrose 50% Injectable 25 Gram(s) IV Push once  dextrose 50% Injectable 25 Gram(s) IV Push once  docusate sodium 100 milliGRAM(s) Oral two times a day  heparin  Injectable 5000 Unit(s) SubCutaneous every 8 hours  influenza   Vaccine 0.5 milliLiter(s) IntraMuscular once  insulin glargine Injectable (LANTUS) 12 Unit(s) SubCutaneous at bedtime  insulin lispro (HumaLOG) corrective regimen sliding scale   SubCutaneous three times a day before meals  insulin lispro Injectable (HumaLOG) 6 Unit(s) SubCutaneous three times a day before meals  lactobacillus acidophilus 1 Tablet(s) Oral three times a day with meals  metoprolol succinate ER 25 milliGRAM(s) Oral daily  multivitamin 1 Tablet(s) Oral daily  piperacillin/tazobactam IVPB. 3.375 Gram(s) IV Intermittent every 8 hours  polyethylene glycol 3350 17 Gram(s) Oral daily  ranolazine 500 milliGRAM(s) Oral two times a day  senna 2 Tablet(s) Oral at bedtime  simvastatin 80 milliGRAM(s) Oral at bedtime  vancomycin  IVPB 1000 milliGRAM(s) IV Intermittent every 12 hours    MEDICATIONS  (PRN):  acetaminophen   Tablet .. 650 milliGRAM(s) Oral every 6 hours PRN Mild Pain (1 - 3)  bisacodyl Suppository 10 milliGRAM(s) Rectal daily PRN Constipation  dextrose 40% Gel 15 Gram(s) Oral once PRN Blood Glucose LESS THAN 70 milliGRAM(s)/deciliter  glucagon  Injectable 1 milliGRAM(s) IntraMuscular once PRN Glucose LESS THAN 70 milligrams/deciliter  oxyCODONE    IR 5 milliGRAM(s) Oral every 6 hours PRN Moderate Pain (4 - 6)  oxyCODONE    IR 10 milliGRAM(s) Oral every 6 hours PRN Severe Pain (7 - 10)        CAPILLARY BLOOD GLUCOSE      POCT Blood Glucose.: 226 mg/dL (20 Sep 2018 12:19)  POCT Blood Glucose.: 149 mg/dL (20 Sep 2018 08:37)  POCT Blood Glucose.: 188 mg/dL (19 Sep 2018 21:43)  POCT Blood Glucose.: 170 mg/dL (19 Sep 2018 17:18)    I&O's Summary    19 Sep 2018 07:01  -  20 Sep 2018 07:00  --------------------------------------------------------  IN: 1870 mL / OUT: 1600 mL / NET: 270 mL    20 Sep 2018 07:01  -  20 Sep 2018 14:51  --------------------------------------------------------  IN: 980 mL / OUT: 500 mL / NET: 480 mL        PHYSICAL EXAM:  GENERAL: NAD, well-developed  HEAD:  Atraumatic, Normocephalic  EYES: EOMI, PERRLA, conjunctiva and sclera clear  NECK: Supple, No JVD  CHEST/LUNG: Clear to auscultation bilaterally; No wheeze  HEART: Regular rate and rhythm; S1S2  ABDOMEN: Soft, Nontender, Nondistended; Bowel sounds present  EXTREMITIES:  2+ Peripheral Pulses, No clubbing, cyanosis, or edema rle, left foot dressing   PSYCH: AAOx3  NEUROLOGY: sensation intact in foot and LE b/l   SKIN: No rashes or lesions    LABS:                        13.4   7.20  )-----------( 186      ( 20 Sep 2018 07:43 )             39.5     09-20    138  |  104  |  14  ----------------------------<  162<H>  3.9   |  22  |  1.00    Ca    9.1      20 Sep 2018 07:11  Phos  2.6     09-20  Mg     1.8     09-20      PT/INR - ( 19 Sep 2018 08:02 )   PT: 12.6 sec;   INR: 1.11 ratio         PTT - ( 19 Sep 2018 08:02 )  PTT:27.1 sec          RADIOLOGY & ADDITIONAL TESTS:    Imaging Personally Reviewed:    Consultant(s) Notes Reviewed:  vascular     Care Discussed with Consultants/Other Providers:
Patient is a 68y old  Male who presents with a chief complaint of left second toe wound (20 Sep 2018 14:50)       INTERVAL HPI/OVERNIGHT EVENTS:  Patient seen and evaluated at bedside.  Pt is resting comfortable in NAD. Denies N/V/F/C.  Allergies    No Known Allergies    Intolerances        Vital Signs Last 24 Hrs  T(C): 36.8 (20 Sep 2018 13:48), Max: 37.3 (20 Sep 2018 00:30)  T(F): 98.2 (20 Sep 2018 13:48), Max: 99.1 (20 Sep 2018 00:30)  HR: 100 (20 Sep 2018 13:48) (78 - 100)  BP: 122/69 (20 Sep 2018 13:48) (92/59 - 122/69)  BP(mean): --  RR: 18 (20 Sep 2018 13:48) (18 - 18)  SpO2: 97% (20 Sep 2018 13:48) (96% - 98%)    LABS:                        13.4   7.20  )-----------( 186      ( 20 Sep 2018 07:43 )             39.5     09-20    138  |  104  |  14  ----------------------------<  162<H>  3.9   |  22  |  1.00    Ca    9.1      20 Sep 2018 07:11  Phos  2.6     09-20  Mg     1.8     09-20      PT/INR - ( 19 Sep 2018 08:02 )   PT: 12.6 sec;   INR: 1.11 ratio         PTT - ( 19 Sep 2018 08:02 )  PTT:27.1 sec    CAPILLARY BLOOD GLUCOSE      POCT Blood Glucose.: 226 mg/dL (20 Sep 2018 12:19)  POCT Blood Glucose.: 149 mg/dL (20 Sep 2018 08:37)  POCT Blood Glucose.: 188 mg/dL (19 Sep 2018 21:43)  POCT Blood Glucose.: 170 mg/dL (19 Sep 2018 17:18)      Lower Extremity Physical Exam:  surgical site well coapted with packing proximally, pulled, minimal sanguinous expression, no SOI nor avascularity    RADIOLOGY & ADDITIONAL TESTS:  Culture - Tissue with Gram Stain (09.19.18 @ 17:31)    Gram Stain:   No polymorphonuclear cells seen  No organisms seen    Specimen Source: .Tissue Other, deep wound left foot
Patient is a 68y old  Male who presents with a chief complaint of left second toe wound (21 Sep 2018 05:00)       INTERVAL HPI/OVERNIGHT EVENTS:  Patient seen and evaluated at bedside.  Pt is resting comfortable in NAD. Denies N/V/F/C.  Pain rated at 0/10    Allergies    No Known Allergies    Intolerances        Vital Signs Last 24 Hrs  T(C): 37.1 (21 Sep 2018 06:18), Max: 38.1 (20 Sep 2018 16:45)  T(F): 98.7 (21 Sep 2018 06:18), Max: 100.5 (20 Sep 2018 16:45)  HR: 95 (21 Sep 2018 06:18) (93 - 103)  BP: 127/75 (21 Sep 2018 06:18) (104/61 - 127/75)  BP(mean): --  RR: 18 (21 Sep 2018 06:18) (18 - 18)  SpO2: 100% (21 Sep 2018 06:18) (95% - 100%)    LABS:                        13.4   6.37  )-----------( 172      ( 21 Sep 2018 07:52 )             38.4     09-21    137  |  102  |  13  ----------------------------<  149<H>  3.9   |  26  |  1.10    Ca    9.6      21 Sep 2018 05:49  Phos  2.6     09-20  Mg     1.8     09-20          CAPILLARY BLOOD GLUCOSE      POCT Blood Glucose.: 184 mg/dL (21 Sep 2018 08:43)  POCT Blood Glucose.: 220 mg/dL (20 Sep 2018 21:50)  POCT Blood Glucose.: 159 mg/dL (20 Sep 2018 17:26)  POCT Blood Glucose.: 226 mg/dL (20 Sep 2018 12:19)      Lower Extremity Physical Exam:  surgical site well coapted with packing proximally, pulled, minimal sanguinous expression, no SOI no avascularity    RADIOLOGY & ADDITIONAL TESTS:  Culture - Tissue with Gram Stain (09.19.18 @ 17:31)    Gram Stain:   No polymorphonuclear cells seen  No organisms seen    Specimen Source: .Tissue Other, deep wound left foot    Culture Results:   No growth
Patient is a 68y old  Male who presents with a chief complaint of left second toe wound (21 Sep 2018 10:55)      SUBJECTIVE / OVERNIGHT EVENTS: Pt denies foot pain, no cp, sob, chills, had bm     MEDICATIONS  (STANDING):  aspirin enteric coated 81 milliGRAM(s) Oral daily  cilostazol 50 milliGRAM(s) Oral two times a day  clopidogrel Tablet 75 milliGRAM(s) Oral daily  dextrose 5%. 1000 milliLiter(s) (50 mL/Hr) IV Continuous <Continuous>  dextrose 50% Injectable 12.5 Gram(s) IV Push once  dextrose 50% Injectable 25 Gram(s) IV Push once  dextrose 50% Injectable 25 Gram(s) IV Push once  docusate sodium 100 milliGRAM(s) Oral two times a day  heparin  Injectable 5000 Unit(s) SubCutaneous every 8 hours  influenza   Vaccine 0.5 milliLiter(s) IntraMuscular once  insulin glargine Injectable (LANTUS) 12 Unit(s) SubCutaneous at bedtime  insulin lispro (HumaLOG) corrective regimen sliding scale   SubCutaneous three times a day before meals  insulin lispro Injectable (HumaLOG) 7 Unit(s) SubCutaneous three times a day before meals  lactobacillus acidophilus 1 Tablet(s) Oral three times a day with meals  metoprolol succinate ER 25 milliGRAM(s) Oral daily  multivitamin 1 Tablet(s) Oral daily  piperacillin/tazobactam IVPB. 3.375 Gram(s) IV Intermittent every 8 hours  polyethylene glycol 3350 17 Gram(s) Oral daily  ranolazine 500 milliGRAM(s) Oral two times a day  senna 2 Tablet(s) Oral at bedtime  simvastatin 80 milliGRAM(s) Oral at bedtime  vancomycin  IVPB 1000 milliGRAM(s) IV Intermittent every 12 hours    MEDICATIONS  (PRN):  acetaminophen   Tablet .. 650 milliGRAM(s) Oral every 6 hours PRN Mild Pain (1 - 3)  acetaminophen   Tablet .. 650 milliGRAM(s) Oral every 6 hours PRN Temp greater or equal to 38.5C (101.3F)  bisacodyl Suppository 10 milliGRAM(s) Rectal daily PRN Constipation  dextrose 40% Gel 15 Gram(s) Oral once PRN Blood Glucose LESS THAN 70 milliGRAM(s)/deciliter  glucagon  Injectable 1 milliGRAM(s) IntraMuscular once PRN Glucose LESS THAN 70 milligrams/deciliter  oxyCODONE    IR 5 milliGRAM(s) Oral every 6 hours PRN Moderate Pain (4 - 6)  oxyCODONE    IR 10 milliGRAM(s) Oral every 6 hours PRN Severe Pain (7 - 10)        CAPILLARY BLOOD GLUCOSE      POCT Blood Glucose.: 184 mg/dL (21 Sep 2018 08:43)  POCT Blood Glucose.: 220 mg/dL (20 Sep 2018 21:50)  POCT Blood Glucose.: 159 mg/dL (20 Sep 2018 17:26)    I&O's Summary    20 Sep 2018 07:01  -  21 Sep 2018 07:00  --------------------------------------------------------  IN: 2380 mL / OUT: 8700 mL / NET: -6320 mL        PHYSICAL EXAM:  GENERAL: NAD, well-developed  HEAD:  Atraumatic, Normocephalic  EYES: EOMI, PERRLA, conjunctiva and sclera clear  NECK: Supple, No JVD  CHEST/LUNG: Clear to auscultation bilaterally; No wheeze  HEART: Regular rate and rhythm; No murmurs, rubs, or gallops  ABDOMEN: Soft, Nontender, Nondistended; Bowel sounds present  EXTREMITIES:  2+ Peripheral Pulses, No clubbing, cyanosis, or edema rle, left foot dressing   PSYCH: AAOx3  NEUROLOGY: non-focal  SKIN: No rashes or lesions    LABS:                        13.4   6.37  )-----------( 172      ( 21 Sep 2018 07:52 )             38.4     09-21    137  |  102  |  13  ----------------------------<  149<H>  3.9   |  26  |  1.10    Ca    9.6      21 Sep 2018 05:49  Phos  2.6     09-20  Mg     1.8     09-20                RADIOLOGY & ADDITIONAL TESTS:    Imaging Personally Reviewed:    Consultant(s) Notes Reviewed:  podiatry    Care Discussed with Consultants/Other Providers:
Patient is a 68y old  Male who presents with a chief complaint of left second toe wound (21 Sep 2018 18:11)      Vascular Surgery Attending Progress Note    Interval HPI: pt w/o c/o good pain control pt was seen and examined by me at the bedside at 8am     Medications:  acetaminophen   Tablet .. 650 milliGRAM(s) Oral every 6 hours PRN  acetaminophen   Tablet .. 650 milliGRAM(s) Oral every 6 hours PRN  amoxicillin  875 milliGRAM(s)/clavulanate 1 Tablet(s) Oral two times a day  aspirin enteric coated 81 milliGRAM(s) Oral daily  bisacodyl Suppository 10 milliGRAM(s) Rectal daily PRN  cilostazol 50 milliGRAM(s) Oral two times a day  clopidogrel Tablet 75 milliGRAM(s) Oral daily  dextrose 40% Gel 15 Gram(s) Oral once PRN  dextrose 5%. 1000 milliLiter(s) IV Continuous <Continuous>  dextrose 50% Injectable 12.5 Gram(s) IV Push once  dextrose 50% Injectable 25 Gram(s) IV Push once  dextrose 50% Injectable 25 Gram(s) IV Push once  docusate sodium 100 milliGRAM(s) Oral two times a day  glucagon  Injectable 1 milliGRAM(s) IntraMuscular once PRN  heparin  Injectable 5000 Unit(s) SubCutaneous every 8 hours  insulin glargine Injectable (LANTUS) 12 Unit(s) SubCutaneous at bedtime  insulin lispro (HumaLOG) corrective regimen sliding scale   SubCutaneous three times a day before meals  insulin lispro Injectable (HumaLOG) 7 Unit(s) SubCutaneous three times a day before meals  lactobacillus acidophilus 1 Tablet(s) Oral three times a day with meals  metoprolol succinate ER 25 milliGRAM(s) Oral daily  multivitamin 1 Tablet(s) Oral daily  oxyCODONE    IR 5 milliGRAM(s) Oral every 6 hours PRN  oxyCODONE    IR 10 milliGRAM(s) Oral every 6 hours PRN  polyethylene glycol 3350 17 Gram(s) Oral daily  ranolazine 500 milliGRAM(s) Oral two times a day  senna 2 Tablet(s) Oral at bedtime  simvastatin 80 milliGRAM(s) Oral at bedtime      Vital Signs Last 24 Hrs  T(C): 37.1 (21 Sep 2018 10:19), Max: 37.4 (20 Sep 2018 20:40)  T(F): 98.7 (21 Sep 2018 10:19), Max: 99.3 (20 Sep 2018 20:40)  HR: 101 (21 Sep 2018 11:10) (92 - 103)  BP: 107/72 (21 Sep 2018 11:10) (98/60 - 127/75)  BP(mean): --  RR: 18 (21 Sep 2018 11:10) (18 - 18)  SpO2: 97% (21 Sep 2018 11:10) (95% - 100%)  I&O's Summary    20 Sep 2018 07:01  -  21 Sep 2018 07:00  --------------------------------------------------------  IN: 2380 mL / OUT: 8700 mL / NET: -6320 mL    21 Sep 2018 07:01  -  21 Sep 2018 20:30  --------------------------------------------------------  IN: 120 mL / OUT: 0 mL / NET: 120 mL        Physical Exam:  Neuro  A&Ox3 VSS  Vascular:  dressing c/d/i incision stable and healing well       LABS:                        13.4   6.37  )-----------( 172      ( 21 Sep 2018 07:52 )             38.4     09-21    137  |  102  |  13  ----------------------------<  149<H>  3.9   |  26  |  1.10    Ca    9.6      21 Sep 2018 05:49  Phos  2.6     09-20  Mg     1.8     09-20          JORDAN RAUSCH MD  [phone]
Patient with no anginal chest pain or shortness of breath  patient tolerated toe amputation well this morning        MEDICATIONS  (STANDING):  aspirin enteric coated 81 milliGRAM(s) Oral daily  cilostazol 50 milliGRAM(s) Oral two times a day  clopidogrel Tablet 75 milliGRAM(s) Oral daily  dextrose 5%. 1000 milliLiter(s) (50 mL/Hr) IV Continuous <Continuous>  dextrose 50% Injectable 12.5 Gram(s) IV Push once  dextrose 50% Injectable 25 Gram(s) IV Push once  dextrose 50% Injectable 25 Gram(s) IV Push once  docusate sodium 100 milliGRAM(s) Oral two times a day  heparin  Injectable 5000 Unit(s) SubCutaneous every 8 hours  influenza   Vaccine 0.5 milliLiter(s) IntraMuscular once  insulin glargine Injectable (LANTUS) 12 Unit(s) SubCutaneous at bedtime  insulin lispro (HumaLOG) corrective regimen sliding scale   SubCutaneous three times a day before meals  insulin lispro Injectable (HumaLOG) 6 Unit(s) SubCutaneous three times a day before meals  lactobacillus acidophilus 1 Tablet(s) Oral three times a day with meals  metoprolol succinate ER 25 milliGRAM(s) Oral daily  multivitamin 1 Tablet(s) Oral daily  piperacillin/tazobactam IVPB. 3.375 Gram(s) IV Intermittent every 8 hours  polyethylene glycol 3350 17 Gram(s) Oral daily  ranolazine 500 milliGRAM(s) Oral two times a day  senna 2 Tablet(s) Oral at bedtime  simvastatin 80 milliGRAM(s) Oral at bedtime  sodium chloride 0.9%. 1000 milliLiter(s) (50 mL/Hr) IV Continuous <Continuous>  vancomycin  IVPB 1000 milliGRAM(s) IV Intermittent every 12 hours    MEDICATIONS  (PRN):  acetaminophen   Tablet .. 650 milliGRAM(s) Oral every 6 hours PRN Mild Pain (1 - 3)  dextrose 40% Gel 15 Gram(s) Oral once PRN Blood Glucose LESS THAN 70 milliGRAM(s)/deciliter  glucagon  Injectable 1 milliGRAM(s) IntraMuscular once PRN Glucose LESS THAN 70 milligrams/deciliter  oxyCODONE    5 mG/acetaminophen 325 mG 1 Tablet(s) Oral every 6 hours PRN Moderate Pain (4 - 6)      LABS:                        13.7   4.07  )-----------( 207      ( 19 Sep 2018 08:00 )             40.8     Hemoglobin: 13.7 g/dL (09-19 @ 08:00)  Hemoglobin: 14.8 g/dL (09-18 @ 09:06)  Hemoglobin: 15.6 g/dL (09-17 @ 09:26)  Hemoglobin: 14.6 g/dL (09-16 @ 09:15)    09-19    138  |  101  |  19  ----------------------------<  184<H>  4.3   |  24  |  1.08    Ca    9.1      19 Sep 2018 07:00  Phos  2.8     09-18  Mg     2.0     09-18      Creatinine Trend: 1.08<--, 1.22<--, 1.26<--, 1.10<--, 1.10<--   PT/INR - ( 19 Sep 2018 08:02 )   PT: 12.6 sec;   INR: 1.11 ratio         PTT - ( 19 Sep 2018 08:02 )  PTT:27.1 sec        PHYSICAL EXAM  Vital Signs Last 24 Hrs  T(C): 36.4 (19 Sep 2018 12:00), Max: 36.7 (19 Sep 2018 06:08)  T(F): 97.5 (19 Sep 2018 12:00), Max: 98.1 (19 Sep 2018 06:08)  HR: 76 (19 Sep 2018 12:00) (60 - 89)  BP: 119/70 (19 Sep 2018 12:00) (104/59 - 123/80)  BP(mean): 77 (19 Sep 2018 10:15) (76 - 88)  RR: 18 (19 Sep 2018 12:00) (14 - 18)  SpO2: 97% (19 Sep 2018 12:00) (95% - 100%)      Appearance: Normal	  HEENT:   Normal oral mucosa, PERRL, EOMI	  Lymphatic: No lymphadenopathy , no edema  Cardiovascular: Normal S1 S2, No JVD, No murmurs , Peripheral pulses palpable 2+ bilaterally  Respiratory: Lungs clear to auscultation, normal effort 	  Gastrointestinal:  Soft, Non-tender, + BS	  Skin: No rashes, No ecchymoses, No cyanosis, warm to touch  Musculoskeletal: Normal range of motion, normal strength  Psychiatry:  Mood & affect appropriate    TELEMETRY: 	  none     DIAGNOSTIC TESTING:  [ ] Echocardiogram: < from: Transthoracic Echocardiogram w/Doppler (10.24.12 @ 16:16) >  Conclusions:  1. Mitral annular calcification, otherwise normal mitral  valve. No mitral valve regurgitation seen.  2. Normal trileaflet aortic valve. No aortic valve  regurgitation seen.  3. Normal left atrium.  LA volume index = 20 cc/m2.  4. Normal left ventricular internal dimensions and wall  thickness.  5. Low normal left ventricular systolic function. EF 55%.  The basal inferior and infero-septal walls are severely  hypokinetic.  6. Mild diastolic dysfunction (Stage I).  7. Normal right atrium.  8. Normal right ventricular size and function.  9. Estimated right ventricular systolic pressure equals 27  mm Hg, assuming right atrial pressure equals 10 mm Hg,  consistent with normal pulmonary pressures.  10. Normal tricuspid valve. Mild tricuspid regurgitation.    < end of copied text >    [ ]  Catheterization:  [ ] Stress Test:   < from: Nuclear Stress Test, Exercise (06.27.12 @ 00:00) >  ------------------------------------------------------------------------  IMPRESSIONS:Abnormal Study  * Exercise capacity: 11 METS, Excellent for age and  gender.  * Chest Pain: No chest pain with exercise.  * Symptom: No Symptom.  * HR Response: Appropriate.  * BP Response: Appropriate.  * Heart Rhythm: Normal Sinus Rhythm - 79 BPM.  * ECG Changes: ST Depression: 2 mm slowly upsloping in  leads V4, V5, V6 started at 06:00 min of exercise at HR of  130 and persisted 07:00 min into recovery.  All Changes  resolved by 7 minutes of recovery.  * Arrhythmia: None.  * Myocardial Perfusion SPECT results are abnormal at 97 %  of MPHR.  * There are medium sized, moderate defects in the inferior  and inferoseptal walls that are fixed, consistent with  infarct; this defect partially corrects with prone  imgaing, suggesting a significant component of  diaphragmatic attenuation artifact is contributing to its  extent.  There is no evidence of significant ischemia.  * Post-stress gated wall motion analysis was performed  (LVEF = 49 %;LVEDV = 99 ml.)with  mild hypokinesis of the  inferior wall.  * Artifact was present as noted above.    < end of copied text >    OTHER: 	  MRI: < from: MR Foot w/wo IV Cont, Left (09.14.18 @ 21:14) >  Impression: Sinus tracts at the tip of the second digit with   osteomyelitis throughout the second distal and middle phalanges and at   the head of the second proximal phalanx. Marrow edema within the   remainder of the second proximal phalanx is favored to be reactive at   this time. Adjacent cellulitis and nonspecific myositis.    < end of copied text >      CATH: < from: Cardiac Cath Lab - Adult (09.17.18 @ 13:33) >  posterior tibial: Angiography showed moderate to severe diffuse  atherosclerosis. Distal left posterior tibial: Angiography showed mild to  moderate diffuse atherosclerosis. distal to the malleolus there is mod to  severe diffuse small vessel dz of the entire foot the medila plantar is  not vix the lateral plantar has mod diffuse dz Ostial left peroneal: There  was a 100 % stenosis. Proximal left peroneal: There was a 100% stenosis.  Mid left peroneal: There was a 100 % stenosis. Distal left peroneal:  Angiography showed mild atherosclerosis.    < end of copied text >  < from: Cardiac Cath Lab - Adult (09.17.18 @ 13:33) >  RIGHT LOWER EXTREMITY VESSELS: Ostial right common iliac: Angiography  showed mild atherosclerosis. right common femoral artery access 5 fr  sheath placed using guidewire and seldinger tech omniflush catheter was  advanced reformed and parked into the infrarenal aorta dx aortogram w  bilateral iliac arteries performed there is mild infrarenal dz then using  guidewire and seldinger tech the left iliac arterial system was accessed  and the omniflush catheter was advanced reformed and parked into the mid  left external iliac artery lle angio performed Proximal right common  iliac: Angiography showed mild atherosclerosis. Mid right common iliac:        < from: Transthoracic Echocardiogram (09.18.18 @ 15:11) >  Conclusions:  1. Calcified trileaflet aortic valve with decreased  opening. Peak transaortic valve gradient equals 13 mm Hg,  mean transaortic valve gradient equals 8 mm Hg, estimated  aortic valve area equals 1.9 sqcm (by continuity equation),  aortic valve velocity time integral equals 29 cm,  consistent with mild aortic stenosis.  2. Mild segmental left ventricular systolic dysfunction.  the basal segments of the septum and inferior walls are  hypocontractile. The inferior wall is hypokinetic.  3. Normal right ventricular size and function    < end of copied text >      ASSESSMENT/PLAN: 68 year old male well known to our service with HTN HLD DM CAD s/p stents 4 years ago in [place] with a repeat cath in 2016 that revealed diffuse 40% lesion in the LAD, 90% diagonal lesion, 50% obtuse marginal 1 lesion, treated with med management given small size of vessels not amenable to cath, with normal LV function on TTE 1/18 in our office with evidence of prox and mid inferior wall infarct but no ischemia on exercise NST in our office 1/18 (ef 48%) and no ischemia admitted with left non healing toe wound. He has failed oral antibiotics and presents for further management. He has no anginal symptoms.   BL LE angiogram 9/17/18 with 100% ostial peroneal stenosis and 100% left peritoneal stenosis:       -- Patient tolerated left 2nd toe amputation well   -- TTE reviewed  - EF preserved at 46% - mild seg inferior wall dysfxn is consistent with inferior wall MI noted on outpt NST in our office 1/18                             - c/w med management of CAD for now with DAPT/statin/Metoprolol/ Ranexa  -- Vascular / P0d follow up pending   -- blood cx negative   -- IV abx per ID  -- f/u with Dr Morales for cardiology upon DC
Podiatry Pager #: [phone]    Patient is a 68y old  Male who presents with a chief complaint of left second toe wound and art insuff (18 Sep 2018 21:19)      INTERVAL HPI/OVERNIGHT EVENTS:   Pt is scheduled for _left foot parital 2nd ray resection___ with Dr. Corrigan____ at __7:30_____. Patient is aware of procedure and is NPO since midnight.    MEDICATIONS  (STANDING):  aspirin enteric coated 81 milliGRAM(s) Oral daily  cilostazol 50 milliGRAM(s) Oral two times a day  clopidogrel Tablet 75 milliGRAM(s) Oral daily  dextrose 5%. 1000 milliLiter(s) (50 mL/Hr) IV Continuous <Continuous>  dextrose 50% Injectable 12.5 Gram(s) IV Push once  dextrose 50% Injectable 25 Gram(s) IV Push once  dextrose 50% Injectable 25 Gram(s) IV Push once  docusate sodium 100 milliGRAM(s) Oral two times a day  heparin  Injectable 5000 Unit(s) SubCutaneous every 8 hours  influenza   Vaccine 0.5 milliLiter(s) IntraMuscular once  insulin glargine Injectable (LANTUS) 12 Unit(s) SubCutaneous at bedtime  insulin lispro (HumaLOG) corrective regimen sliding scale   SubCutaneous three times a day before meals  insulin lispro Injectable (HumaLOG) 6 Unit(s) SubCutaneous three times a day before meals  lactobacillus acidophilus 1 Tablet(s) Oral three times a day with meals  metoprolol succinate ER 25 milliGRAM(s) Oral daily  multivitamin 1 Tablet(s) Oral daily  piperacillin/tazobactam IVPB. 3.375 Gram(s) IV Intermittent every 8 hours  polyethylene glycol 3350 17 Gram(s) Oral daily  ranolazine 500 milliGRAM(s) Oral two times a day  senna 2 Tablet(s) Oral at bedtime  simvastatin 80 milliGRAM(s) Oral at bedtime  sodium chloride 0.9%. 1000 milliLiter(s) (50 mL/Hr) IV Continuous <Continuous>  vancomycin  IVPB 1000 milliGRAM(s) IV Intermittent every 12 hours    MEDICATIONS  (PRN):  dextrose 40% Gel 15 Gram(s) Oral once PRN Blood Glucose LESS THAN 70 milliGRAM(s)/deciliter  glucagon  Injectable 1 milliGRAM(s) IntraMuscular once PRN Glucose LESS THAN 70 milligrams/deciliter      Allergies    No Known Allergies    Intolerances        Vital Signs Last 24 Hrs  T(C): 36.7 (19 Sep 2018 06:08), Max: 36.7 (19 Sep 2018 06:08)  T(F): 98.1 (19 Sep 2018 06:08), Max: 98.1 (19 Sep 2018 06:08)  HR: 82 (19 Sep 2018 06:08) (82 - 96)  BP: 110/68 (19 Sep 2018 06:08) (110/68 - 123/80)  BP(mean): --  RR: 18 (19 Sep 2018 06:08) (18 - 18)  SpO2: 97% (19 Sep 2018 06:08) (96% - 99%)    LABS:                        14.8   4.67  )-----------( 239      ( 18 Sep 2018 09:06 )             43.6     09-18    136  |  100  |  20  ----------------------------<  218<H>  4.4   |  26  |  1.22    Ca    9.6      18 Sep 2018 07:24  Phos  2.8     18  Mg     2.0     -18      PT/INR - ( 17 Sep 2018 09:26 )   PT: 11.5 sec;   INR: 1.02 ratio         PTT - ( 17 Sep 2018 09:26 )  PTT:28.5 sec  Urinalysis Basic - ( 18 Sep 2018 09:09 )    Color: Yellow / Appearance: Clear / S.024 / pH: x  Gluc: x / Ketone: Trace  / Bili: Negative / Urobili: Negative mg/dL   Blood: x / Protein: Negative mg/dL / Nitrite: Negative   Leuk Esterase: Negative / RBC: x / WBC x   Sq Epi: x / Non Sq Epi: x / Bacteria: x      CAPILLARY BLOOD GLUCOSE      POCT Blood Glucose.: 185 mg/dL (19 Sep 2018 06:22)  POCT Blood Glucose.: 158 mg/dL (18 Sep 2018 22:24)  POCT Blood Glucose.: 252 mg/dL (18 Sep 2018 17:11)  POCT Blood Glucose.: 286 mg/dL (18 Sep 2018 13:23)  POCT Blood Glucose.: 219 mg/dL (18 Sep 2018 08:17)  POCT Blood Glucose.: 213 mg/dL (18 Sep 2018 07:16)      RADIOLOGY & ADDITIONAL TESTS:
Podiatry pager #: [phone]    Patient is a 68y old  Male who presents with a chief complaint of left second toe wound (18 Sep 2018 06:32)       INTERVAL HPI/OVERNIGHT EVENTS:  Patient seen and evaluated at bedside.  Pt is resting comfortable in NAD. Denies N/V/F/C.     Allergies    No Known Allergies    Intolerances        Vital Signs Last 24 Hrs  T(C): 36.8 (18 Sep 2018 06:08), Max: 36.9 (17 Sep 2018 20:39)  T(F): 98.3 (18 Sep 2018 06:08), Max: 98.4 (17 Sep 2018 20:39)  HR: 82 (18 Sep 2018 06:08) (72 - 86)  BP: 114/71 (18 Sep 2018 06:08) (114/71 - 135/76)  BP(mean): --  RR: 17 (18 Sep 2018 06:08) (17 - 18)  SpO2: 95% (18 Sep 2018 06:08) (87% - 97%)    LABS:                        15.6   5.63  )-----------( 271      ( 17 Sep 2018 09:26 )             44.9     09-18    136  |  100  |  20  ----------------------------<  218<H>  4.4   |  26  |  1.22    Ca    9.6      18 Sep 2018 07:24  Phos  2.8     09-18  Mg     2.0     09-18      PT/INR - ( 17 Sep 2018 09:26 )   PT: 11.5 sec;   INR: 1.02 ratio         PTT - ( 17 Sep 2018 09:26 )  PTT:28.5 sec    CAPILLARY BLOOD GLUCOSE      POCT Blood Glucose.: 213 mg/dL (18 Sep 2018 07:16)  POCT Blood Glucose.: 178 mg/dL (17 Sep 2018 22:26)  POCT Blood Glucose.: 200 mg/dL (17 Sep 2018 17:32)  POCT Blood Glucose.: 172 mg/dL (17 Sep 2018 12:43)  POCT Blood Glucose.: 195 mg/dL (17 Sep 2018 08:58)      Lower Extremity Physical Exam:    Vascular: DP/PT 1/4 B/L, CFT <3 sec x 10, Temp gradient warm to warm B/L  Neurology: Epicritic sensation diminished to level of ankle, B/L  Musculoskeletal/Ortho: No pain with palpation at any pedal sites.   Skin:   Wound #1: LEFT foot  Location: 2nd digit   Size: Ulceration at distal tip of digit as well as the DIPJ. ulceration 0.3 x 0.4   Etiology: Pressure  Depth: down to distal phalanx   Wound bed: necrotic   Drainage: none   Odor: none     RADIOLOGY & ADDITIONAL TESTS:
Subjective:  pt seen and examined, no complaints, ROS - .     aspirin enteric coated 81 milliGRAM(s) Oral daily  cilostazol 50 milliGRAM(s) Oral two times a day  clopidogrel Tablet 75 milliGRAM(s) Oral daily  dextrose 40% Gel 15 Gram(s) Oral once PRN  dextrose 5%. 1000 milliLiter(s) IV Continuous <Continuous>  dextrose 50% Injectable 12.5 Gram(s) IV Push once  dextrose 50% Injectable 25 Gram(s) IV Push once  dextrose 50% Injectable 25 Gram(s) IV Push once  glucagon  Injectable 1 milliGRAM(s) IntraMuscular once PRN  heparin  Injectable 5000 Unit(s) SubCutaneous every 8 hours  influenza   Vaccine 0.5 milliLiter(s) IntraMuscular once  insulin glargine Injectable (LANTUS) 10 Unit(s) SubCutaneous at bedtime  insulin lispro (HumaLOG) corrective regimen sliding scale   SubCutaneous three times a day before meals  insulin lispro Injectable (HumaLOG) 6 Unit(s) SubCutaneous three times a day before meals  metoprolol succinate ER 25 milliGRAM(s) Oral daily  multivitamin 1 Tablet(s) Oral daily  piperacillin/tazobactam IVPB. 3.375 Gram(s) IV Intermittent every 8 hours  ranolazine 500 milliGRAM(s) Oral two times a day  simvastatin 80 milliGRAM(s) Oral at bedtime  vancomycin  IVPB 1000 milliGRAM(s) IV Intermittent every 12 hours                            14.6   5.85  )-----------( 261      ( 16 Sep 2018 09:15 )             43.3       Hemoglobin: 14.6 g/dL (09-16 @ 09:15)  Hemoglobin: 14.3 g/dL (09-13 @ 14:31)      09-16    138  |  102  |  20  ----------------------------<  215<H>  4.5   |  26  |  1.10    Ca    9.4      16 Sep 2018 07:16      Creatinine Trend: 1.10<--, 1.10<--    COAGS:           T(C): 36.6 (09-16-18 @ 21:50), Max: 36.9 (09-16-18 @ 12:09)  HR: 87 (09-16-18 @ 21:50) (69 - 87)  BP: 121/77 (09-16-18 @ 21:50) (109/69 - 123/73)  RR: 18 (09-16-18 @ 21:50) (18 - 18)  SpO2: 98% (09-16-18 @ 21:50) (95% - 98%)  Wt(kg): --    I&O's Summary    15 Sep 2018 07:01  -  16 Sep 2018 07:00  --------------------------------------------------------  IN: 770 mL / OUT: 0 mL / NET: 770 mL    16 Sep 2018 07:01  -  17 Sep 2018 05:36  --------------------------------------------------------  IN: 2070 mL / OUT: 0 mL / NET: 2070 mL      Appearance: Normal	  HEENT:   Normal oral mucosa, PERRL, EOMI	  Lymphatic: No lymphadenopathy , no edema  Cardiovascular: Normal S1 S2, No JVD, No murmurs , Peripheral pulses palpable 2+ bilaterally  Respiratory: Lungs clear to auscultation, normal effort 	  Gastrointestinal:  Soft, Non-tender, + BS	  Skin: No rashes, No ecchymoses, No cyanosis, warm to touch  Musculoskeletal: Normal range of motion, normal strength  Psychiatry:  Mood & affect appropriate    TELEMETRY: 	  none     DIAGNOSTIC TESTING:  [ ] Echocardiogram: < from: Transthoracic Echocardiogram w/Doppler (10.24.12 @ 16:16) >  Conclusions:  1. Mitral annular calcification, otherwise normal mitral  valve. No mitral valve regurgitation seen.  2. Normal trileaflet aortic valve. No aortic valve  regurgitation seen.  3. Normal left atrium.  LA volume index = 20 cc/m2.  4. Normal left ventricular internal dimensions and wall  thickness.  5. Low normal left ventricular systolic function. EF 55%.  The basal inferior and infero-septal walls are severely  hypokinetic.  6. Mild diastolic dysfunction (Stage I).  7. Normal right atrium.  8. Normal right ventricular size and function.  9. Estimated right ventricular systolic pressure equals 27  mm Hg, assuming right atrial pressure equals 10 mm Hg,  consistent with normal pulmonary pressures.  10. Normal tricuspid valve. Mild tricuspid regurgitation.    < end of copied text >    [ ]  Catheterization:  [ ] Stress Test:   < from: Nuclear Stress Test, Exercise (06.27.12 @ 00:00) >  ------------------------------------------------------------------------  IMPRESSIONS:Abnormal Study  * Exercise capacity: 11 METS, Excellent for age and  gender.  * Chest Pain: No chest pain with exercise.  * Symptom: No Symptom.  * HR Response: Appropriate.  * BP Response: Appropriate.  * Heart Rhythm: Normal Sinus Rhythm - 79 BPM.  * ECG Changes: ST Depression: 2 mm slowly upsloping in  leads V4, V5, V6 started at 06:00 min of exercise at HR of  130 and persisted 07:00 min into recovery.  All Changes  resolved by 7 minutes of recovery.  * Arrhythmia: None.  * Myocardial Perfusion SPECT results are abnormal at 97 %  of MPHR.  * There are medium sized, moderate defects in the inferior  and inferoseptal walls that are fixed, consistent with  infarct; this defect partially corrects with prone  imgaing, suggesting a significant component of  diaphragmatic attenuation artifact is contributing to its  extent.  There is no evidence of significant ischemia.  * Post-stress gated wall motion analysis was performed  (LVEF = 49 %;LVEDV = 99 ml.)with  mild hypokinesis of the  inferior wall.  * Artifact was present as noted above.    < end of copied text >    OTHER: 	  MRI: < from: MR Foot w/wo IV Cont, Left (09.14.18 @ 21:14) >  Impression: Sinus tracts at the tip of the second digit with   osteomyelitis throughout the second distal and middle phalanges and at   the head of the second proximal phalanx. Marrow edema within the   remainder of the second proximal phalanx is favored to be reactive at   this time. Adjacent cellulitis and nonspecific myositis.    < end of copied text >        ASSESSMENT/PLAN: 	68y Male hx of HTN, Chol , DM, CAD with stents , now left foot non healing wound.     Vascular noted, Pt for OR today   ID follow up , Podiatry follow up   DAPT , pletal ,   Cont IV ABX , F/U on Blood cultures   Cont BB , stable BP   D/W Dr Anthony
Subjective:  pt seen and examined, no complaints, ROS - .     aspirin enteric coated 81 milliGRAM(s) Oral daily  cilostazol 50 milliGRAM(s) Oral two times a day  clopidogrel Tablet 75 milliGRAM(s) Oral daily  dextrose 40% Gel 15 Gram(s) Oral once PRN  dextrose 5%. 1000 milliLiter(s) IV Continuous <Continuous>  dextrose 50% Injectable 12.5 Gram(s) IV Push once  dextrose 50% Injectable 25 Gram(s) IV Push once  dextrose 50% Injectable 25 Gram(s) IV Push once  glucagon  Injectable 1 milliGRAM(s) IntraMuscular once PRN  influenza   Vaccine 0.5 milliLiter(s) IntraMuscular once  insulin lispro (HumaLOG) corrective regimen sliding scale   SubCutaneous three times a day before meals  metoprolol succinate ER 25 milliGRAM(s) Oral daily  multivitamin 1 Tablet(s) Oral daily  piperacillin/tazobactam IVPB. 3.375 Gram(s) IV Intermittent every 8 hours  ranolazine 500 milliGRAM(s) Oral two times a day  simvastatin 80 milliGRAM(s) Oral at bedtime  vancomycin  IVPB 1000 milliGRAM(s) IV Intermittent every 12 hours                            14.3   7.0   )-----------( 356      ( 13 Sep 2018 14:31 )             42.6       Hemoglobin: 14.3 g/dL (09-13 @ 14:31)      09-13    137  |  100  |  22  ----------------------------<  216<H>  4.7   |  24  |  1.10    Ca    9.9      13 Sep 2018 14:31    TPro  7.9  /  Alb  4.3  /  TBili  0.3  /  DBili  x   /  AST  23  /  ALT  18  /  AlkPhos  75  09-13    Creatinine Trend: 1.10<--    COAGS:           T(C): 36.6 (09-15-18 @ 06:06), Max: 36.6 (09-14-18 @ 16:15)  HR: 80 (09-15-18 @ 06:06) (75 - 82)  BP: 119/78 (09-15-18 @ 06:06) (108/67 - 119/78)  RR: 18 (09-15-18 @ 06:06) (18 - 19)  SpO2: 96% (09-15-18 @ 06:06) (96% - 98%)  Wt(kg): --    I&O's Summary    14 Sep 2018 07:01  -  15 Sep 2018 06:39  --------------------------------------------------------  IN: 950 mL / OUT: 1 mL / NET: 949 mL        Appearance: Normal	  HEENT:   Normal oral mucosa, PERRL, EOMI	  Lymphatic: No lymphadenopathy , no edema  Cardiovascular: Normal S1 S2, No JVD, No murmurs , Peripheral pulses palpable 2+ bilaterally  Respiratory: Lungs clear to auscultation, normal effort 	  Gastrointestinal:  Soft, Non-tender, + BS	  Skin: No rashes, No ecchymoses, No cyanosis, warm to touch  Musculoskeletal: Normal range of motion, normal strength  Psychiatry:  Mood & affect appropriate    TELEMETRY: 	  none     DIAGNOSTIC TESTING:  [ ] Echocardiogram: < from: Transthoracic Echocardiogram w/Doppler (10.24.12 @ 16:16) >  Conclusions:  1. Mitral annular calcification, otherwise normal mitral  valve. No mitral valve regurgitation seen.  2. Normal trileaflet aortic valve. No aortic valve  regurgitation seen.  3. Normal left atrium.  LA volume index = 20 cc/m2.  4. Normal left ventricular internal dimensions and wall  thickness.  5. Low normal left ventricular systolic function. EF 55%.  The basal inferior and infero-septal walls are severely  hypokinetic.  6. Mild diastolic dysfunction (Stage I).  7. Normal right atrium.  8. Normal right ventricular size and function.  9. Estimated right ventricular systolic pressure equals 27  mm Hg, assuming right atrial pressure equals 10 mm Hg,  consistent with normal pulmonary pressures.  10. Normal tricuspid valve. Mild tricuspid regurgitation.    < end of copied text >    [ ]  Catheterization:  [ ] Stress Test:   < from: Nuclear Stress Test, Exercise (06.27.12 @ 00:00) >  ------------------------------------------------------------------------  IMPRESSIONS:Abnormal Study  * Exercise capacity: 11 METS, Excellent for age and  gender.  * Chest Pain: No chest pain with exercise.  * Symptom: No Symptom.  * HR Response: Appropriate.  * BP Response: Appropriate.  * Heart Rhythm: Normal Sinus Rhythm - 79 BPM.  * ECG Changes: ST Depression: 2 mm slowly upsloping in  leads V4, V5, V6 started at 06:00 min of exercise at HR of  130 and persisted 07:00 min into recovery.  All Changes  resolved by 7 minutes of recovery.  * Arrhythmia: None.  * Myocardial Perfusion SPECT results are abnormal at 97 %  of MPHR.  * There are medium sized, moderate defects in the inferior  and inferoseptal walls that are fixed, consistent with  infarct; this defect partially corrects with prone  imgaing, suggesting a significant component of  diaphragmatic attenuation artifact is contributing to its  extent.  There is no evidence of significant ischemia.  * Post-stress gated wall motion analysis was performed  (LVEF = 49 %;LVEDV = 99 ml.)with  mild hypokinesis of the  inferior wall.  * Artifact was present as noted above.    < end of copied text >    OTHER: 	        ASSESSMENT/PLAN: 	68y Male hx of HTN, Chol , DM, CAD with stents , now left foot non healing wound.     POD follow up appreciated , pod surg planning digital vs. P2RR  DAPT , pletal ,   Cont IV ABX , F/U on Blood cultures   Cont BB , stable BP   MRI pending   D/W Dr Szymanski
Subjective:  pt seen and examined, no complaints, ROS - .   right groin soft , no hematoma     aspirin enteric coated 81 milliGRAM(s) Oral daily  cilostazol 50 milliGRAM(s) Oral two times a day  clopidogrel Tablet 75 milliGRAM(s) Oral daily  dextrose 40% Gel 15 Gram(s) Oral once PRN  dextrose 5%. 1000 milliLiter(s) IV Continuous <Continuous>  dextrose 50% Injectable 12.5 Gram(s) IV Push once  dextrose 50% Injectable 25 Gram(s) IV Push once  dextrose 50% Injectable 25 Gram(s) IV Push once  glucagon  Injectable 1 milliGRAM(s) IntraMuscular once PRN  heparin  Injectable 5000 Unit(s) SubCutaneous every 8 hours  influenza   Vaccine 0.5 milliLiter(s) IntraMuscular once  insulin glargine Injectable (LANTUS) 10 Unit(s) SubCutaneous at bedtime  insulin lispro (HumaLOG) corrective regimen sliding scale   SubCutaneous three times a day before meals  insulin lispro Injectable (HumaLOG) 6 Unit(s) SubCutaneous three times a day before meals  metoprolol succinate ER 25 milliGRAM(s) Oral daily  multivitamin 1 Tablet(s) Oral daily  piperacillin/tazobactam IVPB. 3.375 Gram(s) IV Intermittent every 8 hours  ranolazine 500 milliGRAM(s) Oral two times a day  simvastatin 80 milliGRAM(s) Oral at bedtime  vancomycin  IVPB 1000 milliGRAM(s) IV Intermittent every 12 hours                            15.6   5.63  )-----------( 271      ( 17 Sep 2018 09:26 )             44.9       Hemoglobin: 15.6 g/dL (09-17 @ 09:26)  Hemoglobin: 14.6 g/dL (09-16 @ 09:15)  Hemoglobin: 14.3 g/dL (09-13 @ 14:31)      09-17    132<L>  |  95<L>  |  22  ----------------------------<  232<H>  3.7   |  26  |  1.26    Ca    10.0      17 Sep 2018 07:04      Creatinine Trend: 1.26<--, 1.10<--, 1.10<--    COAGS:           T(C): 36.8 (09-18-18 @ 06:08), Max: 36.9 (09-17-18 @ 20:39)  HR: 82 (09-18-18 @ 06:08) (72 - 86)  BP: 114/71 (09-18-18 @ 06:08) (114/71 - 135/76)  RR: 17 (09-18-18 @ 06:08) (17 - 18)  SpO2: 95% (09-18-18 @ 06:08) (87% - 97%)  Wt(kg): --    I&O's Summary    16 Sep 2018 07:01  -  17 Sep 2018 07:00  --------------------------------------------------------  IN: 2070 mL / OUT: 0 mL / NET: 2070 mL    17 Sep 2018 07:01  -  18 Sep 2018 06:35  --------------------------------------------------------  IN: 200 mL / OUT: 0 mL / NET: 200 mL        Appearance: Normal	  HEENT:   Normal oral mucosa, PERRL, EOMI	  Lymphatic: No lymphadenopathy , no edema  Cardiovascular: Normal S1 S2, No JVD, No murmurs , Peripheral pulses palpable 2+ bilaterally  Respiratory: Lungs clear to auscultation, normal effort 	  Gastrointestinal:  Soft, Non-tender, + BS	  Skin: No rashes, No ecchymoses, No cyanosis, warm to touch  Musculoskeletal: Normal range of motion, normal strength  Psychiatry:  Mood & affect appropriate    TELEMETRY: 	  none     DIAGNOSTIC TESTING:  [ ] Echocardiogram: < from: Transthoracic Echocardiogram w/Doppler (10.24.12 @ 16:16) >  Conclusions:  1. Mitral annular calcification, otherwise normal mitral  valve. No mitral valve regurgitation seen.  2. Normal trileaflet aortic valve. No aortic valve  regurgitation seen.  3. Normal left atrium.  LA volume index = 20 cc/m2.  4. Normal left ventricular internal dimensions and wall  thickness.  5. Low normal left ventricular systolic function. EF 55%.  The basal inferior and infero-septal walls are severely  hypokinetic.  6. Mild diastolic dysfunction (Stage I).  7. Normal right atrium.  8. Normal right ventricular size and function.  9. Estimated right ventricular systolic pressure equals 27  mm Hg, assuming right atrial pressure equals 10 mm Hg,  consistent with normal pulmonary pressures.  10. Normal tricuspid valve. Mild tricuspid regurgitation.    < end of copied text >    [ ]  Catheterization:  [ ] Stress Test:   < from: Nuclear Stress Test, Exercise (06.27.12 @ 00:00) >  ------------------------------------------------------------------------  IMPRESSIONS:Abnormal Study  * Exercise capacity: 11 METS, Excellent for age and  gender.  * Chest Pain: No chest pain with exercise.  * Symptom: No Symptom.  * HR Response: Appropriate.  * BP Response: Appropriate.  * Heart Rhythm: Normal Sinus Rhythm - 79 BPM.  * ECG Changes: ST Depression: 2 mm slowly upsloping in  leads V4, V5, V6 started at 06:00 min of exercise at HR of  130 and persisted 07:00 min into recovery.  All Changes  resolved by 7 minutes of recovery.  * Arrhythmia: None.  * Myocardial Perfusion SPECT results are abnormal at 97 %  of MPHR.  * There are medium sized, moderate defects in the inferior  and inferoseptal walls that are fixed, consistent with  infarct; this defect partially corrects with prone  imgaing, suggesting a significant component of  diaphragmatic attenuation artifact is contributing to its  extent.  There is no evidence of significant ischemia.  * Post-stress gated wall motion analysis was performed  (LVEF = 49 %;LVEDV = 99 ml.)with  mild hypokinesis of the  inferior wall.  * Artifact was present as noted above.    < end of copied text >    OTHER: 	  MRI: < from: MR Foot w/wo IV Cont, Left (09.14.18 @ 21:14) >  Impression: Sinus tracts at the tip of the second digit with   osteomyelitis throughout the second distal and middle phalanges and at   the head of the second proximal phalanx. Marrow edema within the   remainder of the second proximal phalanx is favored to be reactive at   this time. Adjacent cellulitis and nonspecific myositis.    < end of copied text >      CATH: < from: Cardiac Cath Lab - Adult (09.17.18 @ 13:33) >  posterior tibial: Angiography showed moderate to severe diffuse  atherosclerosis. Distal left posterior tibial: Angiography showed mild to  moderate diffuse atherosclerosis. distal to the malleolus there is mod to  severe diffuse small vessel dz of the entire foot the medila plantar is  not vix the lateral plantar has mod diffuse dz Ostial left peroneal: There  was a 100 % stenosis. Proximal left peroneal: There was a 100% stenosis.  Mid left peroneal: There was a 100 % stenosis. Distal left peroneal:  Angiography showed mild atherosclerosis.    < end of copied text >  < from: Cardiac Cath Lab - Adult (09.17.18 @ 13:33) >  RIGHT LOWER EXTREMITY VESSELS: Ostial right common iliac: Angiography  showed mild atherosclerosis. right common femoral artery access 5 fr  sheath placed using guidewire and "[employer] tech omniflush catheter was  advanced reformed and parked into the infrarenal aorta dx aortogram w  bilateral iliac arteries performed there is mild infrarenal dz then using  guidewire and seldinger tech the left iliac arterial system was accessed  and the omniflush catheter was advanced reformed and parked into the mid  left external iliac artery lle angio performed Proximal right common  iliac: Angiography showed mild atherosclerosis. Mid right common iliac:    < end of copied text >      ASSESSMENT/PLAN: 	68y Male hx of HTN, Chol , DM, CAD with stents , now left foot non healing wound.     Vascular / POd follow up - Amputation pending   ID follow up , Cont IV ABX ,   DAPT , pletal ,   GI / DVT prophylaxis, keep K>4, mag >2.0    Cont BB , stable BP  pt optimized for amputation from cardiac stand point .    D/W Dr Atnhony
VASCULAR SURGERY POST OP NOTE    STATUS POST:  LLE diagnostic angio    POST OPERATIVE DAY #: 0    SUBJECTIVE: Pt seen and examined at bedside. Pt c/o RLE pain, controlled with pain medication. Denies chest pain, sob, palpitations, HA, fever, chills, N/v.  Tolerating diet.    OBJECTIVE:  Vital Signs Last 24 Hrs  T(C): 36.9 (17 Sep 2018 20:39), Max: 36.9 (17 Sep 2018 06:15)  T(F): 98.4 (17 Sep 2018 20:39), Max: 98.4 (17 Sep 2018 06:15)  HR: 86 (17 Sep 2018 20:39) (70 - 86)  BP: 127/79 (17 Sep 2018 20:39) (113/69 - 127/79)  BP(mean): --  RR: 18 (17 Sep 2018 20:39) (18 - 18)  SpO2: 95% (17 Sep 2018 20:39) (87% - 97%)    I&O's Summary    16 Sep 2018 07:01  -  17 Sep 2018 07:00  --------------------------------------------------------  IN: 2070 mL / OUT: 0 mL / NET: 2070 mL    17 Sep 2018 07:01  -  17 Sep 2018 22:52  --------------------------------------------------------  IN: 200 mL / OUT: 0 mL / NET: 200 mL      Physical Exam:  General Appearance: Appears well, NAD, A&O x3  Chest: Equal expansion bilaterally, equal breath sounds  CV: Pulse regular presently  Abdomen: Soft, NT, ND  R groin: soft, no active bleeding or hematoma present, NTTP, dressing c/d/i  b/l Lower Extremities: soft, warm, sensation/motor intact, palp DP b/l, L dopp PT, L foot dressing c/d/i    LABS:                        15.6   5.63  )-----------( 271      ( 17 Sep 2018 09:26 )             44.9     09-17    132<L>  |  95<L>  |  22  ----------------------------<  232<H>  3.7   |  26  |  1.26    Ca    10.0      17 Sep 2018 07:04      PT/INR - ( 17 Sep 2018 09:26 )   PT: 11.5 sec;   INR: 1.02 ratio         PTT - ( 17 Sep 2018 09:26 )  PTT:28.5 sec      RADIOLOGY & ADDITIONAL STUDIES:
pt seen and examined, no complaints, ROS - .     acetaminophen   Tablet .. 650 milliGRAM(s) Oral every 6 hours PRN  acetaminophen   Tablet .. 650 milliGRAM(s) Oral every 6 hours PRN  aspirin enteric coated 81 milliGRAM(s) Oral daily  bisacodyl Suppository 10 milliGRAM(s) Rectal daily PRN  cilostazol 50 milliGRAM(s) Oral two times a day  clopidogrel Tablet 75 milliGRAM(s) Oral daily  dextrose 40% Gel 15 Gram(s) Oral once PRN  dextrose 5%. 1000 milliLiter(s) IV Continuous <Continuous>  dextrose 50% Injectable 12.5 Gram(s) IV Push once  dextrose 50% Injectable 25 Gram(s) IV Push once  dextrose 50% Injectable 25 Gram(s) IV Push once  docusate sodium 100 milliGRAM(s) Oral two times a day  glucagon  Injectable 1 milliGRAM(s) IntraMuscular once PRN  heparin  Injectable 5000 Unit(s) SubCutaneous every 8 hours  influenza   Vaccine 0.5 milliLiter(s) IntraMuscular once  insulin glargine Injectable (LANTUS) 12 Unit(s) SubCutaneous at bedtime  insulin lispro (HumaLOG) corrective regimen sliding scale   SubCutaneous three times a day before meals  insulin lispro Injectable (HumaLOG) 6 Unit(s) SubCutaneous three times a day before meals  lactobacillus acidophilus 1 Tablet(s) Oral three times a day with meals  metoprolol succinate ER 25 milliGRAM(s) Oral daily  multivitamin 1 Tablet(s) Oral daily  oxyCODONE    IR 5 milliGRAM(s) Oral every 6 hours PRN  oxyCODONE    IR 10 milliGRAM(s) Oral every 6 hours PRN  piperacillin/tazobactam IVPB. 3.375 Gram(s) IV Intermittent every 8 hours  polyethylene glycol 3350 17 Gram(s) Oral daily  ranolazine 500 milliGRAM(s) Oral two times a day  senna 2 Tablet(s) Oral at bedtime  simvastatin 80 milliGRAM(s) Oral at bedtime  vancomycin  IVPB 1000 milliGRAM(s) IV Intermittent every 12 hours                            13.4   7.20  )-----------( 186      ( 20 Sep 2018 07:43 )             39.5       Hemoglobin: 13.4 g/dL (09-20 @ 07:43)  Hemoglobin: 13.7 g/dL (09-19 @ 08:00)  Hemoglobin: 14.8 g/dL (09-18 @ 09:06)  Hemoglobin: 15.6 g/dL (09-17 @ 09:26)  Hemoglobin: 14.6 g/dL (09-16 @ 09:15)      09-20    138  |  104  |  14  ----------------------------<  162<H>  3.9   |  22  |  1.00    Ca    9.1      20 Sep 2018 07:11  Phos  2.6     09-20  Mg     1.8     09-20      Creatinine Trend: 1.00<--, 1.08<--, 1.22<--, 1.26<--, 1.10<--, 1.10<--    COAGS:           T(C): 37.3 (09-21-18 @ 00:16), Max: 38.1 (09-20-18 @ 16:45)  HR: 93 (09-21-18 @ 00:16) (92 - 103)  BP: 107/62 (09-21-18 @ 00:16) (92/59 - 122/69)  RR: 18 (09-21-18 @ 00:16) (18 - 18)  SpO2: 97% (09-21-18 @ 00:16) (95% - 98%)  Wt(kg): --    I&O's Summary    19 Sep 2018 07:01  -  20 Sep 2018 07:00  --------------------------------------------------------  IN: 1870 mL / OUT: 1600 mL / NET: 270 mL    20 Sep 2018 07:01  -  21 Sep 2018 05:01  --------------------------------------------------------  IN: 2160 mL / OUT: 8450 mL / NET: -6290 mL      Appearance: Normal	  HEENT:   Normal oral mucosa, PERRL, EOMI	  Lymphatic: No lymphadenopathy , no edema  Cardiovascular: Normal S1 S2, No JVD, No murmurs , Peripheral pulses palpable 2+ bilaterally  Respiratory: Lungs clear to auscultation, normal effort 	  Gastrointestinal:  Soft, Non-tender, + BS	  Skin: No rashes, No ecchymoses, No cyanosis, warm to touch  Musculoskeletal: Normal range of motion, normal strength  Psychiatry:  Mood & affect appropriate    TELEMETRY: 	  none     DIAGNOSTIC TESTING:  [ ] Echocardiogram: < from: Transthoracic Echocardiogram w/Doppler (10.24.12 @ 16:16) >  Conclusions:  1. Mitral annular calcification, otherwise normal mitral  valve. No mitral valve regurgitation seen.  2. Normal trileaflet aortic valve. No aortic valve  regurgitation seen.  3. Normal left atrium.  LA volume index = 20 cc/m2.  4. Normal left ventricular internal dimensions and wall  thickness.  5. Low normal left ventricular systolic function. EF 55%.  The basal inferior and infero-septal walls are severely  hypokinetic.  6. Mild diastolic dysfunction (Stage I).  7. Normal right atrium.  8. Normal right ventricular size and function.  9. Estimated right ventricular systolic pressure equals 27  mm Hg, assuming right atrial pressure equals 10 mm Hg,  consistent with normal pulmonary pressures.  10. Normal tricuspid valve. Mild tricuspid regurgitation.    < end of copied text >    [ ]  Catheterization:  [ ] Stress Test:   < from: Nuclear Stress Test, Exercise (06.27.12 @ 00:00) >  ------------------------------------------------------------------------  IMPRESSIONS:Abnormal Study  * Exercise capacity: 11 METS, Excellent for age and  gender.  * Chest Pain: No chest pain with exercise.  * Symptom: No Symptom.  * HR Response: Appropriate.  * BP Response: Appropriate.  * Heart Rhythm: Normal Sinus Rhythm - 79 BPM.  * ECG Changes: ST Depression: 2 mm slowly upsloping in  leads V4, V5, V6 started at 06:00 min of exercise at HR of  130 and persisted 07:00 min into recovery.  All Changes  resolved by 7 minutes of recovery.  * Arrhythmia: None.  * Myocardial Perfusion SPECT results are abnormal at 97 %  of MPHR.  * There are medium sized, moderate defects in the inferior  and inferoseptal walls that are fixed, consistent with  infarct; this defect partially corrects with prone  imgaing, suggesting a significant component of  diaphragmatic attenuation artifact is contributing to its  extent.  There is no evidence of significant ischemia.  * Post-stress gated wall motion analysis was performed  (LVEF = 49 %;LVEDV = 99 ml.)with  mild hypokinesis of the  inferior wall.  * Artifact was present as noted above.    < end of copied text >    OTHER: 	  MRI: < from: MR Foot w/wo IV Cont, Left (09.14.18 @ 21:14) >  Impression: Sinus tracts at the tip of the second digit with   osteomyelitis throughout the second distal and middle phalanges and at   the head of the second proximal phalanx. Marrow edema within the   remainder of the second proximal phalanx is favored to be reactive at   this time. Adjacent cellulitis and nonspecific myositis.    < end of copied text >      CATH: < from: Cardiac Cath Lab - Adult (09.17.18 @ 13:33) >  posterior tibial: Angiography showed moderate to severe diffuse  atherosclerosis. Distal left posterior tibial: Angiography showed mild to  moderate diffuse atherosclerosis. distal to the malleolus there is mod to  severe diffuse small vessel dz of the entire foot the medila plantar is  not vix the lateral plantar has mod diffuse dz Ostial left peroneal: There  was a 100 % stenosis. Proximal left peroneal: There was a 100% stenosis.  Mid left peroneal: There was a 100 % stenosis. Distal left peroneal:  Angiography showed mild atherosclerosis.    < end of copied text >  < from: Cardiac Cath Lab - Adult (09.17.18 @ 13:33) >  RIGHT LOWER EXTREMITY VESSELS: Ostial right common iliac: Angiography  showed mild atherosclerosis. right common femoral artery access 5 fr  sheath placed using guidewire and [employer] tech omniflush catheter was  advanced reformed and parked into the infrarenal aorta dx aortogram w  bilateral iliac arteries performed there is mild infrarenal dz then using  guidewire and [employer] tech the left iliac arterial system was accessed  and the omniflush catheter was advanced reformed and parked into the mid  left external iliac artery lle angio performed Proximal right common  iliac: Angiography showed mild atherosclerosis. Mid right common iliac:    < end of copied text >      ASSESSMENT/PLAN: 	68y Male hx of HTN, Chol , DM, CAD with stents , now left foot non healing wound.   s/p amputation     Vascular / POd follow up- NO further sx at present   ID follow up , Cont IV ABX ,   DAPT , pletal ,  wound care    GI / DVT prophylaxis, keep K>4, mag >2.0    Cont BB , stable BP  D/W Dr Anthony
pt seen and examined, no complaints, ROS - .     acetaminophen   Tablet .. 650 milliGRAM(s) Oral every 6 hours PRN  aspirin enteric coated 81 milliGRAM(s) Oral daily  cilostazol 50 milliGRAM(s) Oral two times a day  clopidogrel Tablet 75 milliGRAM(s) Oral daily  dextrose 40% Gel 15 Gram(s) Oral once PRN  dextrose 5%. 1000 milliLiter(s) IV Continuous <Continuous>  dextrose 50% Injectable 12.5 Gram(s) IV Push once  dextrose 50% Injectable 25 Gram(s) IV Push once  dextrose 50% Injectable 25 Gram(s) IV Push once  docusate sodium 100 milliGRAM(s) Oral two times a day  glucagon  Injectable 1 milliGRAM(s) IntraMuscular once PRN  heparin  Injectable 5000 Unit(s) SubCutaneous every 8 hours  influenza   Vaccine 0.5 milliLiter(s) IntraMuscular once  insulin glargine Injectable (LANTUS) 12 Unit(s) SubCutaneous at bedtime  insulin lispro (HumaLOG) corrective regimen sliding scale   SubCutaneous three times a day before meals  insulin lispro Injectable (HumaLOG) 6 Unit(s) SubCutaneous three times a day before meals  lactobacillus acidophilus 1 Tablet(s) Oral three times a day with meals  metoprolol succinate ER 25 milliGRAM(s) Oral daily  multivitamin 1 Tablet(s) Oral daily  oxyCODONE    5 mG/acetaminophen 325 mG 1 Tablet(s) Oral every 6 hours PRN  piperacillin/tazobactam IVPB. 3.375 Gram(s) IV Intermittent every 8 hours  polyethylene glycol 3350 17 Gram(s) Oral daily  ranolazine 500 milliGRAM(s) Oral two times a day  senna 2 Tablet(s) Oral at bedtime  simvastatin 80 milliGRAM(s) Oral at bedtime  sodium chloride 0.9%. 1000 milliLiter(s) IV Continuous <Continuous>  vancomycin  IVPB 1000 milliGRAM(s) IV Intermittent every 12 hours                            13.7   4.07  )-----------( 207      ( 19 Sep 2018 08:00 )             40.8       Hemoglobin: 13.7 g/dL (09-19 @ 08:00)  Hemoglobin: 14.8 g/dL (09-18 @ 09:06)  Hemoglobin: 15.6 g/dL (09-17 @ 09:26)  Hemoglobin: 14.6 g/dL (09-16 @ 09:15)      09-19    138  |  101  |  19  ----------------------------<  184<H>  4.3   |  24  |  1.08    Ca    9.1      19 Sep 2018 07:00  Phos  2.8     09-18  Mg     2.0     09-18      Creatinine Trend: 1.08<--, 1.22<--, 1.26<--, 1.10<--, 1.10<--    COAGS:           T(C): 37.2 (09-20-18 @ 05:21), Max: 37.3 (09-20-18 @ 00:30)  HR: 98 (09-20-18 @ 05:21) (60 - 98)  BP: 104/65 (09-20-18 @ 05:21) (94/58 - 121/67)  RR: 18 (09-20-18 @ 05:21) (14 - 18)  SpO2: 98% (09-20-18 @ 05:21) (95% - 100%)  Wt(kg): --    I&O's Summary    18 Sep 2018 07:01  -  19 Sep 2018 07:00  --------------------------------------------------------  IN: 930 mL / OUT: 0 mL / NET: 930 mL    19 Sep 2018 07:01  -  20 Sep 2018 06:25  --------------------------------------------------------  IN: 1870 mL / OUT: 1600 mL / NET: 270 mL        Appearance: Normal	  HEENT:   Normal oral mucosa, PERRL, EOMI	  Lymphatic: No lymphadenopathy , no edema  Cardiovascular: Normal S1 S2, No JVD, No murmurs , Peripheral pulses palpable 2+ bilaterally  Respiratory: Lungs clear to auscultation, normal effort 	  Gastrointestinal:  Soft, Non-tender, + BS	  Skin: No rashes, No ecchymoses, No cyanosis, warm to touch  Musculoskeletal: Normal range of motion, normal strength  Psychiatry:  Mood & affect appropriate    TELEMETRY: 	  none     DIAGNOSTIC TESTING:  [ ] Echocardiogram: < from: Transthoracic Echocardiogram w/Doppler (10.24.12 @ 16:16) >  Conclusions:  1. Mitral annular calcification, otherwise normal mitral  valve. No mitral valve regurgitation seen.  2. Normal trileaflet aortic valve. No aortic valve  regurgitation seen.  3. Normal left atrium.  LA volume index = 20 cc/m2.  4. Normal left ventricular internal dimensions and wall  thickness.  5. Low normal left ventricular systolic function. EF 55%.  The basal inferior and infero-septal walls are severely  hypokinetic.  6. Mild diastolic dysfunction (Stage I).  7. Normal right atrium.  8. Normal right ventricular size and function.  9. Estimated right ventricular systolic pressure equals 27  mm Hg, assuming right atrial pressure equals 10 mm Hg,  consistent with normal pulmonary pressures.  10. Normal tricuspid valve. Mild tricuspid regurgitation.    < end of copied text >    [ ]  Catheterization:  [ ] Stress Test:   < from: Nuclear Stress Test, Exercise (06.27.12 @ 00:00) >  ------------------------------------------------------------------------  IMPRESSIONS:Abnormal Study  * Exercise capacity: 11 METS, Excellent for age and  gender.  * Chest Pain: No chest pain with exercise.  * Symptom: No Symptom.  * HR Response: Appropriate.  * BP Response: Appropriate.  * Heart Rhythm: Normal Sinus Rhythm - 79 BPM.  * ECG Changes: ST Depression: 2 mm slowly upsloping in  leads V4, V5, V6 started at 06:00 min of exercise at HR of  130 and persisted 07:00 min into recovery.  All Changes  resolved by 7 minutes of recovery.  * Arrhythmia: None.  * Myocardial Perfusion SPECT results are abnormal at 97 %  of MPHR.  * There are medium sized, moderate defects in the inferior  and inferoseptal walls that are fixed, consistent with  infarct; this defect partially corrects with prone  imgaing, suggesting a significant component of  diaphragmatic attenuation artifact is contributing to its  extent.  There is no evidence of significant ischemia.  * Post-stress gated wall motion analysis was performed  (LVEF = 49 %;LVEDV = 99 ml.)with  mild hypokinesis of the  inferior wall.  * Artifact was present as noted above.    < end of copied text >    OTHER: 	  MRI: < from: MR Foot w/wo IV Cont, Left (09.14.18 @ 21:14) >  Impression: Sinus tracts at the tip of the second digit with   osteomyelitis throughout the second distal and middle phalanges and at   the head of the second proximal phalanx. Marrow edema within the   remainder of the second proximal phalanx is favored to be reactive at   this time. Adjacent cellulitis and nonspecific myositis.    < end of copied text >      CATH: < from: Cardiac Cath Lab - Adult (09.17.18 @ 13:33) >  posterior tibial: Angiography showed moderate to severe diffuse  atherosclerosis. Distal left posterior tibial: Angiography showed mild to  moderate diffuse atherosclerosis. distal to the malleolus there is mod to  severe diffuse small vessel dz of the entire foot the medila plantar is  not vix the lateral plantar has mod diffuse dz Ostial left peroneal: There  was a 100 % stenosis. Proximal left peroneal: There was a 100% stenosis.  Mid left peroneal: There was a 100 % stenosis. Distal left peroneal:  Angiography showed mild atherosclerosis.    < end of copied text >  < from: Cardiac Cath Lab - Adult (09.17.18 @ 13:33) >  RIGHT LOWER EXTREMITY VESSELS: Ostial right common iliac: Angiography  showed mild atherosclerosis. right common femoral artery access 5 fr  sheath placed using guidewire and [employer] tech omniflush catheter was  advanced reformed and parked into the infrarenal aorta dx aortogram w  bilateral iliac arteries performed there is mild infrarenal dz then using  guidewire and [employer] tech the left iliac arterial system was accessed  and the omniflush catheter was advanced reformed and parked into the mid  left external iliac artery lle angio performed Proximal right common  iliac: Angiography showed mild atherosclerosis. Mid right common iliac:    < end of copied text >      ASSESSMENT/PLAN: 	68y Male hx of HTN, Chol , DM, CAD with stents , now left foot non healing wound.   s/p amputation     Vascular / POd follow up  ID follow up , Cont IV ABX ,   DAPT , pletal ,   GI / DVT prophylaxis, keep K>4, mag >2.0    Cont BB , stable BP  D/W Dr Anthony

## 2018-09-21 NOTE — PROGRESS NOTE ADULT - PROBLEM SELECTOR PLAN 1
- c/w Zosyn/Vancomycin day 8  - MRI reveals Sinus tracts at the tip of the second digit with osteomyelitis throughout the second distal and middle phalanges and at the head of the second proximal phalanx  - FABBY reveals Severe arterial occlusive disease of the left lower extremity at the   level of metatarsals  - Pt s/p Vascular angiogram 9/17  - Pt s/p left 2nd digit resection per podiatry 9/19   - Podiatry and vascular recs appreciated  - PT eval  - Pain control with tylenol and oxycodone prn, bowel regimen with senna, colace, miralax  -ID follow up pending prior to discharge

## 2018-09-21 NOTE — PHYSICAL THERAPY INITIAL EVALUATION ADULT - PERTINENT HX OF CURRENT PROBLEM, REHAB EVAL
68M with h/o  DMT2, HLD, CAD s/p stent on ASA/Plavix who presents  with c/o left second toe wound x 3 weeks. He denies LLE pain, fever/chills, CP, SOB, abd pain, n/v. Pt reports that he first noticed wound while in [place] where he was started on antibiotics. He had no improvement of wound and  saw Podiatry 1 week ago who prescribed Amoxicillin. He followed up with Podiatry today and was noted to have worsening of wound so he was sent to ED for further management.

## 2018-09-21 NOTE — PROGRESS NOTE ADULT - PROBLEM SELECTOR PLAN 6
- Heparin SC for DVT ppx

## 2018-09-21 NOTE — PHYSICAL THERAPY INITIAL EVALUATION ADULT - ADDITIONAL COMMENTS
Pt lives in a private house, 4 steps to enter with 2 wide handrails; 8-9 steps to the 2nd floor, 1 handrail

## 2018-09-21 NOTE — PROGRESS NOTE ADULT - ASSESSMENT
ASSESSMENT: Patient is a 68M with L 2nd toe wound, planned for podiatry intervention. Vascular surgery consulted to evaluate blood flow to lower extremity.    PLAN:  -s/p lt toe 2 amp   continue pletal 50 bid   will follow as outpt

## 2018-09-21 NOTE — PROGRESS NOTE ADULT - ASSESSMENT
68 year old male with PMH DM2 (A1C 7.1), CAD s/p stent on ASA/Plavix, HLD who presented to [facility] on 9/13 with left second toe wound of ~3 week duration.  In the ED afebrile, normotensive and not tachycardic. WBC on presentation of 7.0 without bands. ESR 16. CRP of 0.11. XR of L foot with erosion of the second distal phalanx with only a portion of the base remaining, which is dorsally displaced. Findings are compatible with osteomyelitis. Patient started on Vancomycin and Zosyn. MRI of L foot with sinus tracts at the tip of the second digit with osteomyelitis throughout the second distal and middle phalanges and at the head of the second proximal phalanx. Superficial wound cultures with Corynebacterium.     2nd digit OM on L Foot - status post amputation of left second toe 9/19/18  Peripheral Vascular Disease  Podiatry follow up appreciated: wound appears clear    Suggest  Doscontinue vancomycin/zosyn 9/13 -->9/21  Begin po Augmented 835 mg po twice daily through 9/26    Please call ID if needed over weekend

## 2018-09-21 NOTE — PROGRESS NOTE ADULT - PROBLEM SELECTOR PROBLEM 1
Osteomyelitis of left foot, unspecified type
Osteomyelitis of left foot, unspecified type
Arterial insufficiency with ischemic ulcer
Osteomyelitis of left foot, unspecified type

## 2018-09-21 NOTE — PROGRESS NOTE ADULT - REASON FOR ADMISSION
left second toe wound
left second toe wound and art insuff
left second toe wound

## 2018-09-21 NOTE — PROGRESS NOTE ADULT - PROBLEM SELECTOR PLAN 3
- s/p stent  - c/w Plavix, ASA, Statin  - cardiology following
- s/p stent  - c/w Plavix, ASA, Statin  - cardiology following for risk stratification for OR
- s/p stent  - c/w Plavix, ASA, Statin  - cardiology following
- s/p stent  - c/w Plavix, ASA, Statin  - cardiology following for risk stratification for OR

## 2018-09-21 NOTE — PROGRESS NOTE ADULT - PROBLEM SELECTOR PLAN 5
- c/w Simvastatin 80mg PO qhs

## 2018-09-21 NOTE — PROGRESS NOTE ADULT - PROVIDER SPECIALTY LIST ADULT
Cardiology
Hospitalist
Infectious Disease
Internal Medicine
Podiatry
Vascular Surgery
Cardiology
Hospitalist
Hospitalist

## 2018-09-22 LAB
-  AMPICILLIN/SULBACTAM: SIGNIFICANT CHANGE UP
-  CEFAZOLIN: SIGNIFICANT CHANGE UP
-  CLINDAMYCIN: SIGNIFICANT CHANGE UP
-  ERYTHROMYCIN: SIGNIFICANT CHANGE UP
-  GENTAMICIN: SIGNIFICANT CHANGE UP
-  OXACILLIN: SIGNIFICANT CHANGE UP
-  PENICILLIN: SIGNIFICANT CHANGE UP
-  RIFAMPIN: SIGNIFICANT CHANGE UP
-  TETRACYCLINE: SIGNIFICANT CHANGE UP
-  TRIMETHOPRIM/SULFAMETHOXAZOLE: SIGNIFICANT CHANGE UP
-  VANCOMYCIN: SIGNIFICANT CHANGE UP
METHOD TYPE: SIGNIFICANT CHANGE UP

## 2018-09-24 LAB
CULTURE RESULTS: SIGNIFICANT CHANGE UP
ORGANISM # SPEC MICROSCOPIC CNT: SIGNIFICANT CHANGE UP
ORGANISM # SPEC MICROSCOPIC CNT: SIGNIFICANT CHANGE UP
SPECIMEN SOURCE: SIGNIFICANT CHANGE UP

## 2018-10-01 ENCOUNTER — APPOINTMENT (OUTPATIENT)
Dept: WOUND CARE | Facility: CLINIC | Age: 68
End: 2018-10-01
Payer: MEDICARE

## 2018-10-01 VITALS
SYSTOLIC BLOOD PRESSURE: 114 MMHG | HEART RATE: 82 BPM | TEMPERATURE: 97.8 F | DIASTOLIC BLOOD PRESSURE: 73 MMHG | HEIGHT: 69 IN

## 2018-10-01 DIAGNOSIS — L97.519 NON-PRESSURE CHRONIC ULCER OF OTHER PART OF RIGHT FOOT WITH UNSPECIFIED SEVERITY: ICD-10-CM

## 2018-10-01 DIAGNOSIS — E11.49 TYPE 2 DIABETES MELLITUS WITH OTHER DIABETIC NEUROLOGICAL COMPLICATION: ICD-10-CM

## 2018-10-01 PROCEDURE — 99213 OFFICE O/P EST LOW 20 MIN: CPT

## 2018-10-08 ENCOUNTER — APPOINTMENT (OUTPATIENT)
Dept: WOUND CARE | Facility: CLINIC | Age: 68
End: 2018-10-08

## 2018-10-20 LAB
CULTURE RESULTS: SIGNIFICANT CHANGE UP
SPECIMEN SOURCE: SIGNIFICANT CHANGE UP

## 2018-10-25 PROCEDURE — 88305 TISSUE EXAM BY PATHOLOGIST: CPT

## 2018-10-25 PROCEDURE — 86140 C-REACTIVE PROTEIN: CPT

## 2018-10-25 PROCEDURE — 86850 RBC ANTIBODY SCREEN: CPT

## 2018-10-25 PROCEDURE — 80053 COMPREHEN METABOLIC PANEL: CPT

## 2018-10-25 PROCEDURE — 88311 DECALCIFY TISSUE: CPT

## 2018-10-25 PROCEDURE — 87186 SC STD MICRODIL/AGAR DIL: CPT

## 2018-10-25 PROCEDURE — 85652 RBC SED RATE AUTOMATED: CPT

## 2018-10-25 PROCEDURE — 81003 URINALYSIS AUTO W/O SCOPE: CPT

## 2018-10-25 PROCEDURE — 90686 IIV4 VACC NO PRSV 0.5 ML IM: CPT

## 2018-10-25 PROCEDURE — 75716 ARTERY X-RAYS ARMS/LEGS: CPT

## 2018-10-25 PROCEDURE — 93005 ELECTROCARDIOGRAM TRACING: CPT

## 2018-10-25 PROCEDURE — 84100 ASSAY OF PHOSPHORUS: CPT

## 2018-10-25 PROCEDURE — 93923 UPR/LXTR ART STDY 3+ LVLS: CPT

## 2018-10-25 PROCEDURE — C1760: CPT

## 2018-10-25 PROCEDURE — 73720 MRI LWR EXTREMITY W/O&W/DYE: CPT

## 2018-10-25 PROCEDURE — 86900 BLOOD TYPING SEROLOGIC ABO: CPT

## 2018-10-25 PROCEDURE — 73630 X-RAY EXAM OF FOOT: CPT

## 2018-10-25 PROCEDURE — 80202 ASSAY OF VANCOMYCIN: CPT

## 2018-10-25 PROCEDURE — 96374 THER/PROPH/DIAG INJ IV PUSH: CPT

## 2018-10-25 PROCEDURE — 71045 X-RAY EXAM CHEST 1 VIEW: CPT

## 2018-10-25 PROCEDURE — 83036 HEMOGLOBIN GLYCOSYLATED A1C: CPT

## 2018-10-25 PROCEDURE — 85730 THROMBOPLASTIN TIME PARTIAL: CPT

## 2018-10-25 PROCEDURE — 93306 TTE W/DOPPLER COMPLETE: CPT

## 2018-10-25 PROCEDURE — 87040 BLOOD CULTURE FOR BACTERIA: CPT

## 2018-10-25 PROCEDURE — 87070 CULTURE OTHR SPECIMN AEROBIC: CPT

## 2018-10-25 PROCEDURE — 97161 PT EVAL LOW COMPLEX 20 MIN: CPT

## 2018-10-25 PROCEDURE — 87102 FUNGUS ISOLATION CULTURE: CPT

## 2018-10-25 PROCEDURE — 85610 PROTHROMBIN TIME: CPT

## 2018-10-25 PROCEDURE — C1887: CPT

## 2018-10-25 PROCEDURE — 86901 BLOOD TYPING SEROLOGIC RH(D): CPT

## 2018-10-25 PROCEDURE — 96375 TX/PRO/DX INJ NEW DRUG ADDON: CPT

## 2018-10-25 PROCEDURE — 85027 COMPLETE CBC AUTOMATED: CPT

## 2018-10-25 PROCEDURE — 83735 ASSAY OF MAGNESIUM: CPT

## 2018-10-25 PROCEDURE — 99285 EMERGENCY DEPT VISIT HI MDM: CPT | Mod: 25

## 2018-10-25 PROCEDURE — C1769: CPT

## 2018-10-25 PROCEDURE — 93970 EXTREMITY STUDY: CPT

## 2018-10-25 PROCEDURE — 36246 INS CATH ABD/L-EXT ART 2ND: CPT

## 2018-10-25 PROCEDURE — C1894: CPT

## 2018-10-25 PROCEDURE — 87205 SMEAR GRAM STAIN: CPT

## 2018-10-25 PROCEDURE — 82962 GLUCOSE BLOOD TEST: CPT

## 2018-10-25 PROCEDURE — 80048 BASIC METABOLIC PNL TOTAL CA: CPT

## 2022-02-03 ENCOUNTER — APPOINTMENT (OUTPATIENT)
Dept: VASCULAR SURGERY | Facility: CLINIC | Age: 72
End: 2022-02-03
Payer: MEDICARE

## 2022-02-03 DIAGNOSIS — L97.514 NON-PRESSURE CHRONIC ULCER OF OTHER PART OF RIGHT FOOT WITH NECROSIS OF BONE: ICD-10-CM

## 2022-02-03 LAB
ANION GAP SERPL CALC-SCNC: 13 MMOL/L
BASOPHILS # BLD AUTO: 0.03 K/UL
BASOPHILS NFR BLD AUTO: 0.5 %
BUN SERPL-MCNC: 18 MG/DL
CALCIUM SERPL-MCNC: 9.9 MG/DL
CHLORIDE SERPL-SCNC: 104 MMOL/L
CO2 SERPL-SCNC: 24 MMOL/L
CREAT SERPL-MCNC: 0.98 MG/DL
EOSINOPHIL # BLD AUTO: 0.08 K/UL
EOSINOPHIL NFR BLD AUTO: 1.3 %
GLUCOSE SERPL-MCNC: 179 MG/DL
HCT VFR BLD CALC: 39.3 %
HGB BLD-MCNC: 13 G/DL
IMM GRANULOCYTES NFR BLD AUTO: 0.6 %
INR PPP: 1.02 RATIO
LYMPHOCYTES # BLD AUTO: 1.25 K/UL
LYMPHOCYTES NFR BLD AUTO: 19.7 %
MAN DIFF?: NORMAL
MCHC RBC-ENTMCNC: 30.4 PG
MCHC RBC-ENTMCNC: 33.1 GM/DL
MCV RBC AUTO: 91.8 FL
MONOCYTES # BLD AUTO: 0.65 K/UL
MONOCYTES NFR BLD AUTO: 10.3 %
NEUTROPHILS # BLD AUTO: 4.28 K/UL
NEUTROPHILS NFR BLD AUTO: 67.6 %
PLATELET # BLD AUTO: 235 K/UL
POTASSIUM SERPL-SCNC: 4.5 MMOL/L
PT BLD: 12 SEC
RBC # BLD: 4.28 M/UL
RBC # FLD: 13.1 %
SODIUM SERPL-SCNC: 141 MMOL/L
WBC # FLD AUTO: 6.33 K/UL

## 2022-02-03 PROCEDURE — 93923 UPR/LXTR ART STDY 3+ LVLS: CPT

## 2022-02-03 PROCEDURE — 99203 OFFICE O/P NEW LOW 30 MIN: CPT

## 2022-02-03 NOTE — CONSULT LETTER
[Dear  ___] : Dear  [unfilled], [Consult Letter:] : I had the pleasure of evaluating your patient, [unfilled]. [Please see my note below.] : Please see my note below. [Consult Closing:] : Thank you very much for allowing me to participate in the care of this patient.  If you have any questions, please do not hesitate to contact me. [Sincerely,] : Sincerely, [FreeTextEntry3] : Jemal Childs M.D., HOLLAND., R.P.V.I.\par \par  [facility]\par  of  Surgery\par Assistant Professor of Radiology\par Vascular Surgery at [place]

## 2022-02-03 NOTE — PHYSICAL EXAM
[JVD] : no jugular venous distention  [Ankle Swelling (On Exam)] : not present [Varicose Veins Of Lower Extremities] : not present [] : not present [Abdomen Tenderness] : ~T ~M No abdominal tenderness [de-identified] : Appears well [Normal Breath Sounds] : Normal breath sounds [Normal Rate and Rhythm] : normal rate and rhythm [2+] : left 2+ [1+] : left 1+ [0] : left 0 [No Rash or Lesion] : No rash or lesion [Skin Ulcer] : ulcer [Skin Induration] : induration [Alert] : alert [Calm] : calm

## 2022-02-03 NOTE — ASSESSMENT
[FreeTextEntry1] : In the office today patient underwent arterial Dopplers with toe pressures which show severe disease of the tibial vessels with a right toe pressure of 25.  Given the severity of disease would recommend urgent angiogram to prevent further tissue loss.  Patient to be scheduled as soon as possible.

## 2022-02-04 ENCOUNTER — NON-APPOINTMENT (OUTPATIENT)
Age: 72
End: 2022-02-04

## 2022-02-04 ENCOUNTER — APPOINTMENT (OUTPATIENT)
Dept: ENDOVASCULAR SURGERY | Facility: CLINIC | Age: 72
End: 2022-02-04
Payer: MEDICARE

## 2022-02-04 ENCOUNTER — RESULT REVIEW (OUTPATIENT)
Age: 72
End: 2022-02-04

## 2022-02-04 VITALS
BODY MASS INDEX: 25.61 KG/M2 | RESPIRATION RATE: 20 BRPM | SYSTOLIC BLOOD PRESSURE: 161 MMHG | WEIGHT: 169 LBS | HEIGHT: 68 IN | DIASTOLIC BLOOD PRESSURE: 79 MMHG | TEMPERATURE: 98.3 F | HEART RATE: 85 BPM | OXYGEN SATURATION: 99 %

## 2022-02-04 PROCEDURE — 75625 CONTRAST EXAM ABDOMINL AORTA: CPT

## 2022-02-04 PROCEDURE — 37229Z: CUSTOM | Mod: RT

## 2022-02-04 RX ORDER — ISOSORBIDE MONONITRATE 30 MG/1
30 TABLET, EXTENDED RELEASE ORAL
Qty: 30 | Refills: 0 | Status: DISCONTINUED | COMMUNITY
Start: 2017-02-13 | End: 2022-02-04

## 2022-02-04 RX ORDER — SIMVASTATIN 40 MG/1
40 TABLET, FILM COATED ORAL
Refills: 0 | Status: ACTIVE | COMMUNITY

## 2022-02-04 RX ORDER — RANOLAZINE 500 MG/1
500 TABLET, EXTENDED RELEASE ORAL
Refills: 0 | Status: ACTIVE | COMMUNITY

## 2022-02-04 RX ORDER — CANAGLIFLOZIN 100 MG/1
100 TABLET, FILM COATED ORAL
Qty: 30 | Refills: 0 | Status: DISCONTINUED | COMMUNITY
Start: 2017-03-13 | End: 2022-02-04

## 2022-02-04 RX ORDER — CANAGLIFLOZIN 100 MG/1
100 TABLET, FILM COATED ORAL
Refills: 0 | Status: ACTIVE | COMMUNITY

## 2022-02-04 NOTE — PHYSICAL EXAM
[JVD] : no jugular venous distention  [Ankle Swelling (On Exam)] : not present [Varicose Veins Of Lower Extremities] : not present [] : not present [Abdomen Tenderness] : ~T ~M No abdominal tenderness [de-identified] : Appears well

## 2022-02-04 NOTE — REASON FOR VISIT
[Consultation] : a consultation visit [FreeTextEntry1] : Right toe ulceration [Other ___] : a [unfilled] visit for [Spouse] : spouse

## 2022-02-04 NOTE — PAST MEDICAL HISTORY
[FreeTextEntry1] : Malignant Hyperthermia Screening Tool and Risk of Bleeding Assessment\par Mr. JALEESA JUAN denies family history of unexpected death following Anesthesia or Exercise.\par Denies Family history of Malignant Hyperthermia, Muscle or Neuromuscular disorder and High Temperature following exercise.\par \par Mr. JALEESA JUAN denies history of Muscle Spasm, Dark or Chocolate - Colored urine and Unanticipated fever immediately following anesthesia or serious exercise. \par Mr. JUAN also denies bleeding tendencies/ Risks of Bleeding.\par

## 2022-02-04 NOTE — HISTORY OF PRESENT ILLNESS
[FreeTextEntry1] : accompanied by wife Verenice [phone]\par covid testing rapid 2-4-2022 not detected\par Cr: 0.98\par took aspirin/plavix yesterday 6pm \par no reported falls\par alert and oriented\par  [FreeTextEntry5] : 6pm [FreeTextEntry6] : Dr. Sullivan

## 2022-02-04 NOTE — PROCEDURE
[Groin check for bleeding/hematoma, vitals checked, and Doppler/pulse checked on admission, then every 15 minutes for an hour and every 30 minutes for 3 hours thereafter.] : Groin check for bleeding/hematoma, vitals checked, and Doppler/pulse checked on admission, then every 15 minutes for an hour and every 30 minutes for 3 hours thereafter.  [Resume Diet] : resume diet [Discharge at _____] : Discharge at [unfilled]. [FreeTextEntry1] : aortogram, right leg angiogram/athrectomy/angioplasty

## 2022-02-04 NOTE — ASSESSMENT
[FreeTextEntry1] :  arterial Doppler toe pressures  show severe disease of the tibial vessels with a right toe pressure of 25.  Given the severity of disease plan for right angiogram and possible intervention

## 2022-02-08 ENCOUNTER — APPOINTMENT (OUTPATIENT)
Dept: VASCULAR SURGERY | Facility: CLINIC | Age: 72
End: 2022-02-08
Payer: MEDICARE

## 2022-02-08 PROCEDURE — 93923 UPR/LXTR ART STDY 3+ LVLS: CPT

## 2022-02-08 PROCEDURE — 99213 OFFICE O/P EST LOW 20 MIN: CPT

## 2022-02-08 PROCEDURE — 93926 LOWER EXTREMITY STUDY: CPT

## 2022-02-08 NOTE — ASSESSMENT
[FreeTextEntry1] : In the office today patient underwent arterial Dopplers which are slightly improved.  In addition, he underwent a duplex study which shows a patent peroneal artery.  At this time would continue observation as toe appears slightly improved.  He will follow-up in 1 week.

## 2022-02-08 NOTE — HISTORY OF PRESENT ILLNESS
[FreeTextEntry1] : 71-year-old gentleman with a history of diabetes presents to the office with a 1 week history of right third toe pain with discoloration and ulceration.  Patient noted that it began after a visit to the podiatrist.  Patient underwent a right leg angiogram with angioplasty of his peroneal artery.  He is here for follow-up.

## 2022-02-08 NOTE — PHYSICAL EXAM
[JVD] : no jugular venous distention  [Normal Breath Sounds] : Normal breath sounds [Normal Rate and Rhythm] : normal rate and rhythm [2+] : left 2+ [1+] : left 1+ [0] : left 0 [Ankle Swelling (On Exam)] : not present [Varicose Veins Of Lower Extremities] : not present [] : not present [Abdomen Tenderness] : ~T ~M No abdominal tenderness [No Rash or Lesion] : No rash or lesion [Skin Ulcer] : ulcer [Skin Induration] : induration [Alert] : alert [Calm] : calm [de-identified] : Appears well

## 2022-02-08 NOTE — REASON FOR VISIT
[Follow-Up: _____] : a [unfilled] follow-up visit [Consultation] : a consultation visit [FreeTextEntry1] : Right toe ulceration

## 2022-02-15 ENCOUNTER — APPOINTMENT (OUTPATIENT)
Dept: VASCULAR SURGERY | Facility: CLINIC | Age: 72
End: 2022-02-15

## 2022-03-15 ENCOUNTER — APPOINTMENT (OUTPATIENT)
Dept: VASCULAR SURGERY | Facility: CLINIC | Age: 72
End: 2022-03-15

## 2022-05-17 ENCOUNTER — INPATIENT (INPATIENT)
Facility: HOSPITAL | Age: 72
LOS: 2 days | Discharge: HOME | DRG: 475 | End: 2022-05-20
Attending: INTERNAL MEDICINE | Admitting: INTERNAL MEDICINE
Payer: MEDICARE

## 2022-05-17 VITALS
TEMPERATURE: 98 F | HEIGHT: 69 IN | WEIGHT: 153 LBS | DIASTOLIC BLOOD PRESSURE: 72 MMHG | SYSTOLIC BLOOD PRESSURE: 139 MMHG | HEART RATE: 79 BPM | OXYGEN SATURATION: 98 % | RESPIRATION RATE: 19 BRPM

## 2022-05-17 DIAGNOSIS — L08.9 LOCAL INFECTION OF THE SKIN AND SUBCUTANEOUS TISSUE, UNSPECIFIED: ICD-10-CM

## 2022-05-17 DIAGNOSIS — Z90.49 ACQUIRED ABSENCE OF OTHER SPECIFIED PARTS OF DIGESTIVE TRACT: Chronic | ICD-10-CM

## 2022-05-17 DIAGNOSIS — Z95.5 PRESENCE OF CORONARY ANGIOPLASTY IMPLANT AND GRAFT: Chronic | ICD-10-CM

## 2022-05-17 LAB
ALBUMIN SERPL ELPH-MCNC: 4.4 G/DL — SIGNIFICANT CHANGE UP (ref 3.3–5)
ALP SERPL-CCNC: 90 U/L — SIGNIFICANT CHANGE UP (ref 40–120)
ALT FLD-CCNC: 19 U/L — SIGNIFICANT CHANGE UP (ref 10–45)
ANION GAP SERPL CALC-SCNC: 13 MMOL/L — SIGNIFICANT CHANGE UP (ref 5–17)
AST SERPL-CCNC: 21 U/L — SIGNIFICANT CHANGE UP (ref 10–40)
BASE EXCESS BLDV CALC-SCNC: 4.1 MMOL/L — HIGH (ref -2–2)
BASOPHILS # BLD AUTO: 0.02 K/UL — SIGNIFICANT CHANGE UP (ref 0–0.2)
BASOPHILS NFR BLD AUTO: 0.4 % — SIGNIFICANT CHANGE UP (ref 0–2)
BILIRUB SERPL-MCNC: 0.5 MG/DL — SIGNIFICANT CHANGE UP (ref 0.2–1.2)
BUN SERPL-MCNC: 18 MG/DL — SIGNIFICANT CHANGE UP (ref 7–23)
CA-I SERPL-SCNC: 1.28 MMOL/L — SIGNIFICANT CHANGE UP (ref 1.15–1.33)
CALCIUM SERPL-MCNC: 10 MG/DL — SIGNIFICANT CHANGE UP (ref 8.4–10.5)
CHLORIDE BLDV-SCNC: 104 MMOL/L — SIGNIFICANT CHANGE UP (ref 96–108)
CHLORIDE SERPL-SCNC: 104 MMOL/L — SIGNIFICANT CHANGE UP (ref 96–108)
CO2 BLDV-SCNC: 32 MMOL/L — HIGH (ref 22–26)
CO2 SERPL-SCNC: 26 MMOL/L — SIGNIFICANT CHANGE UP (ref 22–31)
CREAT SERPL-MCNC: 0.92 MG/DL — SIGNIFICANT CHANGE UP (ref 0.5–1.3)
CRP SERPL-MCNC: <3 MG/L — SIGNIFICANT CHANGE UP (ref 0–4)
EGFR: 89 ML/MIN/1.73M2 — SIGNIFICANT CHANGE UP
EOSINOPHIL # BLD AUTO: 0.16 K/UL — SIGNIFICANT CHANGE UP (ref 0–0.5)
EOSINOPHIL NFR BLD AUTO: 3.3 % — SIGNIFICANT CHANGE UP (ref 0–6)
GAS PNL BLDV: 139 MMOL/L — SIGNIFICANT CHANGE UP (ref 136–145)
GAS PNL BLDV: SIGNIFICANT CHANGE UP
GAS PNL BLDV: SIGNIFICANT CHANGE UP
GLUCOSE BLDC GLUCOMTR-MCNC: 141 MG/DL — HIGH (ref 70–99)
GLUCOSE BLDV-MCNC: 71 MG/DL — SIGNIFICANT CHANGE UP (ref 70–99)
GLUCOSE SERPL-MCNC: 76 MG/DL — SIGNIFICANT CHANGE UP (ref 70–99)
HCO3 BLDV-SCNC: 31 MMOL/L — HIGH (ref 22–29)
HCT VFR BLD CALC: 39.1 % — SIGNIFICANT CHANGE UP (ref 39–50)
HCT VFR BLDA CALC: 40 % — SIGNIFICANT CHANGE UP (ref 39–51)
HGB BLD CALC-MCNC: 13.4 G/DL — SIGNIFICANT CHANGE UP (ref 12.6–17.4)
HGB BLD-MCNC: 13 G/DL — SIGNIFICANT CHANGE UP (ref 13–17)
IMM GRANULOCYTES NFR BLD AUTO: 1 % — SIGNIFICANT CHANGE UP (ref 0–1.5)
LACTATE BLDV-MCNC: 1.7 MMOL/L — SIGNIFICANT CHANGE UP (ref 0.7–2)
LYMPHOCYTES # BLD AUTO: 1.4 K/UL — SIGNIFICANT CHANGE UP (ref 1–3.3)
LYMPHOCYTES # BLD AUTO: 28.7 % — SIGNIFICANT CHANGE UP (ref 13–44)
MCHC RBC-ENTMCNC: 30.2 PG — SIGNIFICANT CHANGE UP (ref 27–34)
MCHC RBC-ENTMCNC: 33.2 GM/DL — SIGNIFICANT CHANGE UP (ref 32–36)
MCV RBC AUTO: 90.9 FL — SIGNIFICANT CHANGE UP (ref 80–100)
MONOCYTES # BLD AUTO: 0.53 K/UL — SIGNIFICANT CHANGE UP (ref 0–0.9)
MONOCYTES NFR BLD AUTO: 10.9 % — SIGNIFICANT CHANGE UP (ref 2–14)
NEUTROPHILS # BLD AUTO: 2.72 K/UL — SIGNIFICANT CHANGE UP (ref 1.8–7.4)
NEUTROPHILS NFR BLD AUTO: 55.7 % — SIGNIFICANT CHANGE UP (ref 43–77)
NRBC # BLD: 0 /100 WBCS — SIGNIFICANT CHANGE UP (ref 0–0)
PCO2 BLDV: 53 MMHG — SIGNIFICANT CHANGE UP (ref 42–55)
PH BLDV: 7.37 — SIGNIFICANT CHANGE UP (ref 7.32–7.43)
PLATELET # BLD AUTO: 232 K/UL — SIGNIFICANT CHANGE UP (ref 150–400)
PO2 BLDV: 30 MMHG — SIGNIFICANT CHANGE UP (ref 25–45)
POTASSIUM BLDV-SCNC: 4.5 MMOL/L — SIGNIFICANT CHANGE UP (ref 3.5–5.1)
POTASSIUM SERPL-MCNC: 4.5 MMOL/L — SIGNIFICANT CHANGE UP (ref 3.5–5.3)
POTASSIUM SERPL-SCNC: 4.5 MMOL/L — SIGNIFICANT CHANGE UP (ref 3.5–5.3)
PROT SERPL-MCNC: 7.4 G/DL — SIGNIFICANT CHANGE UP (ref 6–8.3)
RBC # BLD: 4.3 M/UL — SIGNIFICANT CHANGE UP (ref 4.2–5.8)
RBC # FLD: 12.9 % — SIGNIFICANT CHANGE UP (ref 10.3–14.5)
SAO2 % BLDV: 44.3 % — LOW (ref 67–88)
SARS-COV-2 RNA SPEC QL NAA+PROBE: SIGNIFICANT CHANGE UP
SODIUM SERPL-SCNC: 143 MMOL/L — SIGNIFICANT CHANGE UP (ref 135–145)
WBC # BLD: 4.88 K/UL — SIGNIFICANT CHANGE UP (ref 3.8–10.5)
WBC # FLD AUTO: 4.88 K/UL — SIGNIFICANT CHANGE UP (ref 3.8–10.5)

## 2022-05-17 PROCEDURE — 73630 X-RAY EXAM OF FOOT: CPT | Mod: 26,RT

## 2022-05-17 PROCEDURE — 93923 UPR/LXTR ART STDY 3+ LVLS: CPT | Mod: 26

## 2022-05-17 PROCEDURE — 99285 EMERGENCY DEPT VISIT HI MDM: CPT

## 2022-05-17 RX ORDER — SODIUM CHLORIDE 9 MG/ML
1000 INJECTION, SOLUTION INTRAVENOUS
Refills: 0 | Status: DISCONTINUED | OUTPATIENT
Start: 2022-05-17 | End: 2022-05-20

## 2022-05-17 RX ORDER — AMPICILLIN SODIUM AND SULBACTAM SODIUM 250; 125 MG/ML; MG/ML
3 INJECTION, POWDER, FOR SUSPENSION INTRAMUSCULAR; INTRAVENOUS ONCE
Refills: 0 | Status: COMPLETED | OUTPATIENT
Start: 2022-05-17 | End: 2022-05-17

## 2022-05-17 RX ORDER — DEXTROSE 50 % IN WATER 50 %
12.5 SYRINGE (ML) INTRAVENOUS ONCE
Refills: 0 | Status: DISCONTINUED | OUTPATIENT
Start: 2022-05-17 | End: 2022-05-20

## 2022-05-17 RX ORDER — DEXTROSE 50 % IN WATER 50 %
25 SYRINGE (ML) INTRAVENOUS ONCE
Refills: 0 | Status: DISCONTINUED | OUTPATIENT
Start: 2022-05-17 | End: 2022-05-20

## 2022-05-17 RX ORDER — ATORVASTATIN CALCIUM 80 MG/1
40 TABLET, FILM COATED ORAL AT BEDTIME
Refills: 0 | Status: DISCONTINUED | OUTPATIENT
Start: 2022-05-17 | End: 2022-05-20

## 2022-05-17 RX ORDER — GLUCAGON INJECTION, SOLUTION 0.5 MG/.1ML
1 INJECTION, SOLUTION SUBCUTANEOUS ONCE
Refills: 0 | Status: DISCONTINUED | OUTPATIENT
Start: 2022-05-17 | End: 2022-05-20

## 2022-05-17 RX ORDER — HEPARIN SODIUM 5000 [USP'U]/ML
5000 INJECTION INTRAVENOUS; SUBCUTANEOUS EVERY 8 HOURS
Refills: 0 | Status: DISCONTINUED | OUTPATIENT
Start: 2022-05-17 | End: 2022-05-20

## 2022-05-17 RX ORDER — ASPIRIN/CALCIUM CARB/MAGNESIUM 324 MG
81 TABLET ORAL DAILY
Refills: 0 | Status: DISCONTINUED | OUTPATIENT
Start: 2022-05-17 | End: 2022-05-20

## 2022-05-17 RX ORDER — METOPROLOL TARTRATE 50 MG
25 TABLET ORAL DAILY
Refills: 0 | Status: DISCONTINUED | OUTPATIENT
Start: 2022-05-17 | End: 2022-05-20

## 2022-05-17 RX ORDER — CLOPIDOGREL BISULFATE 75 MG/1
75 TABLET, FILM COATED ORAL DAILY
Refills: 0 | Status: DISCONTINUED | OUTPATIENT
Start: 2022-05-17 | End: 2022-05-20

## 2022-05-17 RX ORDER — INSULIN LISPRO 100/ML
VIAL (ML) SUBCUTANEOUS
Refills: 0 | Status: DISCONTINUED | OUTPATIENT
Start: 2022-05-17 | End: 2022-05-18

## 2022-05-17 RX ORDER — DEXTROSE 50 % IN WATER 50 %
15 SYRINGE (ML) INTRAVENOUS ONCE
Refills: 0 | Status: DISCONTINUED | OUTPATIENT
Start: 2022-05-17 | End: 2022-05-20

## 2022-05-17 RX ORDER — CILOSTAZOL 100 MG/1
50 TABLET ORAL
Refills: 0 | Status: DISCONTINUED | OUTPATIENT
Start: 2022-05-17 | End: 2022-05-20

## 2022-05-17 RX ORDER — VANCOMYCIN HCL 1 G
1000 VIAL (EA) INTRAVENOUS ONCE
Refills: 0 | Status: DISCONTINUED | OUTPATIENT
Start: 2022-05-17 | End: 2022-05-17

## 2022-05-17 RX ORDER — CEFEPIME 1 G/1
2000 INJECTION, POWDER, FOR SOLUTION INTRAMUSCULAR; INTRAVENOUS ONCE
Refills: 0 | Status: DISCONTINUED | OUTPATIENT
Start: 2022-05-17 | End: 2022-05-17

## 2022-05-17 RX ORDER — RANOLAZINE 500 MG/1
500 TABLET, FILM COATED, EXTENDED RELEASE ORAL
Refills: 0 | Status: DISCONTINUED | OUTPATIENT
Start: 2022-05-17 | End: 2022-05-20

## 2022-05-17 RX ORDER — INSULIN LISPRO 100/ML
VIAL (ML) SUBCUTANEOUS AT BEDTIME
Refills: 0 | Status: DISCONTINUED | OUTPATIENT
Start: 2022-05-17 | End: 2022-05-18

## 2022-05-17 RX ADMIN — HEPARIN SODIUM 5000 UNIT(S): 5000 INJECTION INTRAVENOUS; SUBCUTANEOUS at 23:07

## 2022-05-17 RX ADMIN — ATORVASTATIN CALCIUM 40 MILLIGRAM(S): 80 TABLET, FILM COATED ORAL at 23:07

## 2022-05-17 RX ADMIN — AMPICILLIN SODIUM AND SULBACTAM SODIUM 200 GRAM(S): 250; 125 INJECTION, POWDER, FOR SUSPENSION INTRAMUSCULAR; INTRAVENOUS at 16:20

## 2022-05-17 NOTE — ED PROVIDER NOTE - ATTENDING CONTRIBUTION TO CARE
I, Dania Gloria, performed a history and physical exam of the patient and discussed their management with the resident and /or advanced care provider. I reviewed the resident and /or ACP's note and agree with the documented findings and plan of care except where otherwise noted in my note. I was present and available for all procedures.     71 yo M pmh htn, hld, hld, cad on AC presents with R foot infection. Had R third digit partial amputation in Feb 2022, has been noticing 1 week of increased drainage. started on abx but podiatrist (unknown abx)  and had follow up appt with podiatrist today who told patient to go to ER for concern for exposed bone. no fevers.     On exam, patient is well appearing. Purple discoloration to R third toe, +sensation intact, +exposed bone. No active drainage     concern for osteo. labs, iv abx, screening xray, podiatry consult, admission

## 2022-05-17 NOTE — ED PROVIDER NOTE - NS ED ROS FT
CONST: no fevers, no chills, no trauma  EYES: no pain, no blurry vision   ENT: no sore throat, no epistaxis, no rhinorrhea  CV: no chest pain, no palpitations, no orthopnea, no extremity pain or swelling  RESP: no shortness of breath, no cough, no sputum, no pleurisy, no wheezing  ABD: no abdominal pain, no nausea, no vomiting, no diarrhea, no black or bloody stool  : no dysuria, no hematuria, no frequency, no urgency  MSK: no back pain, no neck pain, +R second toe pain   NEURO: no headache, no sensory disturbances, no focal weakness, no dizziness  HEME: no easy bleeding or bruising  SKIN: no diaphoresis, no rash CONST: no fevers, no chills, no trauma  EYES: no pain, no blurry vision   ENT: no sore throat, no epistaxis, no rhinorrhea  CV: no chest pain, no palpitations, no orthopnea, no extremity pain or swelling  RESP: no shortness of breath, no cough, no sputum, no pleurisy, no wheezing  ABD: no abdominal pain, no nausea, no vomiting, no diarrhea, no black or bloody stool  : no dysuria, no hematuria, no frequency, no urgency  MSK: no back pain, no neck pain, +R third toe pain   NEURO: no headache, no sensory disturbances, no focal weakness, no dizziness  HEME: no easy bleeding or bruising  SKIN: no diaphoresis, no rash

## 2022-05-17 NOTE — ED PROVIDER NOTE - NSICDXFAMILYHX_GEN_ALL_CORE_FT
FAMILY HISTORY:  Sibling  Still living? Yes, Estimated age: Age Unknown  Family history of diabetes mellitus in brother, Age at diagnosis: Age Unknown

## 2022-05-17 NOTE — ED ADULT NURSE REASSESSMENT NOTE - NS ED NURSE REASSESS COMMENT FT1
Patient received from ED RN Enmanuel. Patient in stretcher resting comfortably. Breathing spontaneous and unlabored on room air, awaiting admission bed. Patient received from ED RN Enmanuel. Patient in stretcher resting comfortably. Breathing spontaneous and unlabored on room air, awaiting admission bed to medicine.

## 2022-05-17 NOTE — ED ADULT NURSE NOTE - OBJECTIVE STATEMENT
pt presented to ed sent by podiatrist for IV antibiotics due to RT foot 3rd toe infection, osteomyelitis. Pt denies nausea, vomiting, fever, chills, sob, chest pain, headache, dizziness. Will continue to monitor.

## 2022-05-17 NOTE — ED PROVIDER NOTE - NSICDXPASTMEDICALHX_GEN_ALL_CORE_FT
PAST MEDICAL HISTORY:  CAD (coronary artery disease)     Diabetes Type 2, Controoled, Uncomplicated, A1c: 6.4    HLD (hyperlipidemia)     HTN (hypertension)

## 2022-05-17 NOTE — H&P ADULT - NSICDXPASTSURGICALHX_GEN_ALL_CORE_FT
PAST SURGICAL HISTORY:  History of cholecystectomy     S/P primary angioplasty with coronary stent x 4 stents

## 2022-05-17 NOTE — H&P ADULT - ASSESSMENT
72M DMT2, HLD, CAD s/p stent on ASA/Plavix who presents with CC of R foot infection. Patient follows with Dr. Park (podiatrist) - has had a partial digit amputation of the second digit but a few days ago noticing increased drainage from the area. Denies pain, swelling, f/c. Had the area cultured in Dr. Park's office, and started on abx (unsure which one). Told to come to the ER because bone was visible. No other complaints at his time.      # Rt foot wound R foot XR wet read: 3rd proximal phalanx distal lateral bony erosion  right foot partial 3rd digit amputation with lateral wound with exposed bone,   Podiatry consulted   FABBY/PVR   Vascular surgery consult called   Plan for right foot partial 3rd ray resection on Thursday 5/19/22 pending FABBY/vasc recs    # DM HISS follow up A1C.     #CAD s/p stent Aspirin/ Plavix  Cards pre op evaluation   Plan for Rt foot partial 3rd ray resection on Thursday 5/19/22 pending FABBY/vasc recs

## 2022-05-17 NOTE — ED PROVIDER NOTE - PHYSICAL EXAMINATION
Const: Well-nourished, Well-developed, appearing stated age.  Eyes: no conjunctival injection, and symmetrical lids.  HEENT: Head NCAT, no lesions. Atraumatic external nose and ears.   Neck: Symmetric, trachea midline.   CVS: +S1/S2, Radial and DP pulses 2+ b/l.   RESP: Unlabored respiratory effort. Clear to auscultation bilaterally.  GI: Nontender/Nondistended, No CVA tenderness b/l.   MSK: Normocephalic/Atraumatic, Lower Extremities w/o edema b/l.   Skin: +visible R second digit amputation with some yellow drainage from site.   Neuro: Fluent Speech. Motor & Sensation grossly intact.  Psych: Awake, Alert, & Oriented (AAO) x3. Appropriate mood and affect. Const: Well-nourished, Well-developed, appearing stated age.  Eyes: no conjunctival injection, and symmetrical lids.  HEENT: Head NCAT, no lesions. Atraumatic external nose and ears.   Neck: Symmetric, trachea midline.   CVS: +S1/S2, Radial and DP pulses 2+ b/l.   RESP: Unlabored respiratory effort. Clear to auscultation bilaterally.  GI: Nontender/Nondistended, No CVA tenderness b/l.   MSK: Normocephalic/Atraumatic, Lower Extremities w/o edema b/l.   Skin: +visible R third digit amputation with some yellow drainage from site.   Neuro: Fluent Speech. Motor & Sensation grossly intact.  Psych: Awake, Alert, & Oriented (AAO) x3. Appropriate mood and affect.

## 2022-05-17 NOTE — CONSULT NOTE ADULT - ASSESSMENT
73 yo M with right foot wound  -pt seen and evaluated  -afebrile, no leukocytosis, ESR, CRP pending  -R foot XR wet read: 3rd proximal phalanx distal lateral bony erosion  -right foot partial 3rd digit amputation, L foot partial 2nd ray resection healed, right foot partial 3rd digit amputation with medial wound with exposed bone, 3rd digit dactylitis, no drainage, no malodor, etiology 2/2 diabetic neuropathy  -Rec IV Unasyn  -Rec admission to medicine  -Ordered FABBY/PVR  -Rec vasc consult if abnormal FABBY  -pod plan for right foot partial 3rd ray resection on Thursday 5/19/22 pending FABBY/vasc recs  -please document medical optimization for surgery under sedation with local anaesthesia  -discussed with attending   73 yo M with right foot wound  -pt seen and evaluated  -afebrile, no leukocytosis, ESR, CRP pending  -R foot XR wet read: 3rd proximal phalanx distal lateral bony erosion  -right foot partial 3rd digit amputation, L foot partial 2nd ray resection healed, right foot partial 3rd digit amputation with lateral wound with exposed bone, 3rd digit dactylitis, no drainage, no malodor, etiology 2/2 diabetic neuropathy  -Rec IV Unasyn  -Rec admission to medicine  -Ordered FABBY/PVR  -Rec vasc consult if abnormal FABBY  -pod plan for right foot partial 3rd ray resection on Thursday 5/19/22 pending FABBY/vasc recs  -please document medical optimization for surgery under sedation with local anaesthesia  -discussed with attending

## 2022-05-17 NOTE — ED PROVIDER NOTE - CLINICAL SUMMARY MEDICAL DECISION MAKING FREE TEXT BOX
72M presenting for R foot infection. On exam, VSS w foot wound as described above. Will obtain xr to assess for erosions/extent of infection. Likely osteo, will order labs including blood cultures/esr/crp, start broad spectrum antibiotics. Reassess and dispo. Laura Huynh, NANCY PGY3

## 2022-05-17 NOTE — H&P ADULT - HISTORY OF PRESENT ILLNESS
72M DMT2, HLD, CAD s/p stent on ASA/Plavix who presents with CC of R foot infection.   Patient had a partial digit amputation of the second digit but a few days ago noticing increased drainage from the area. Denies pain, swelling, f/c. Had the area cultured in Dr. Park's office, and started on abx (unsure which one). Patient was referred to ED for progressively worsening wound.    No chest pain/ palpitations/ SOB   ROS other wise negative

## 2022-05-17 NOTE — CONSULT NOTE ADULT - SUBJECTIVE AND OBJECTIVE BOX
Podiatry pager #: [phone] ([place])/ [phone] ([facility])    Patient is a 72y old  Male who presents with a chief complaint of right foot wound    HPI: 72M DMT2, HLD, CAD s/p stent on ASA/Plavix who presents with CC of R foot infection. Patient follows with Dr. Park (podiatrist) - has had a partial digit amputation of the second digit but a few days ago noticing increased drainage from the area. Denies pain, swelling, f/c. Had the area cultured in Dr. Park's office, and started on abx (unsure which one). Told to come to the ER because bone was visible. No other complaints at his time.      PAST MEDICAL & SURGICAL HISTORY:  CAD (coronary artery disease)      Diabetes  Type 2, Controoled, Uncomplicated, A1c: 6.4      HLD (hyperlipidemia)      HTN (hypertension)      S/P primary angioplasty with coronary stent  x 4 stents      History of cholecystectomy          MEDICATIONS  (STANDING):  ampicillin/sulbactam  IVPB 3 Gram(s) IV Intermittent once    MEDICATIONS  (PRN):      Allergies    No Known Allergies    Intolerances        VITALS:    Vital Signs Last 24 Hrs  T(C): 36.6 (17 May 2022 15:08), Max: 36.6 (17 May 2022 13:16)  T(F): 97.9 (17 May 2022 15:08), Max: 97.9 (17 May 2022 15:08)  HR: 74 (17 May 2022 15:08) (74 - 79)  BP: 119/68 (17 May 2022 15:08) (119/68 - 139/72)  BP(mean): --  RR: 17 (17 May 2022 15:08) (17 - 19)  SpO2: 98% (17 May 2022 15:08) (98% - 98%)    LABS:                          13.0   4.88  )-----------( 232      ( 17 May 2022 15:19 )             39.1               CAPILLARY BLOOD GLUCOSE              LOWER EXTREMITY PHYSICAL EXAM:    Vascular: DP 0/4 B/L, /PT 1/4 B/L, CFT <3 seconds B/L, Temperature gradient warm to cool B/L  Neuro: Epicritic sensation reduced to the level of forefoot B/L  Musculoskeletal/Ortho: right foot partial 3rd digit amputation, L foot partial 2nd ray resection healed  Skin: right foot partial 3rd digit amputation with medial wound with exposed bone, 3rd digit dactylitis, no drainage, no malodor, etiology 2/2 diabetic neuropathy      RADIOLOGY & ADDITIONAL STUDIES:     Podiatry pager #: [phone] ([place])/ [phone] ([facility])    Patient is a 72y old  Male who presents with a chief complaint of right foot wound    HPI: 72M DMT2, HLD, CAD s/p stent on ASA/Plavix who presents with CC of R foot infection. Patient follows with Dr. Park (podiatrist) - has had a partial digit amputation of the second digit but a few days ago noticing increased drainage from the area. Denies pain, swelling, f/c. Had the area cultured in Dr. Park's office, and started on abx (unsure which one). Told to come to the ER because bone was visible. No other complaints at his time.      PAST MEDICAL & SURGICAL HISTORY:  CAD (coronary artery disease)      Diabetes  Type 2, Controoled, Uncomplicated, A1c: 6.4      HLD (hyperlipidemia)      HTN (hypertension)      S/P primary angioplasty with coronary stent  x 4 stents      History of cholecystectomy          MEDICATIONS  (STANDING):  ampicillin/sulbactam  IVPB 3 Gram(s) IV Intermittent once    MEDICATIONS  (PRN):      Allergies    No Known Allergies    Intolerances        VITALS:    Vital Signs Last 24 Hrs  T(C): 36.6 (17 May 2022 15:08), Max: 36.6 (17 May 2022 13:16)  T(F): 97.9 (17 May 2022 15:08), Max: 97.9 (17 May 2022 15:08)  HR: 74 (17 May 2022 15:08) (74 - 79)  BP: 119/68 (17 May 2022 15:08) (119/68 - 139/72)  BP(mean): --  RR: 17 (17 May 2022 15:08) (17 - 19)  SpO2: 98% (17 May 2022 15:08) (98% - 98%)    LABS:                          13.0   4.88  )-----------( 232      ( 17 May 2022 15:19 )             39.1               CAPILLARY BLOOD GLUCOSE              LOWER EXTREMITY PHYSICAL EXAM:    Vascular: DP 0/4 B/L, /PT 1/4 B/L, CFT <3 seconds B/L, Temperature gradient warm to cool B/L  Neuro: Epicritic sensation reduced to the level of forefoot B/L  Musculoskeletal/Ortho: right foot partial 3rd digit amputation, L foot partial 2nd ray resection healed  Skin: right foot partial 3rd digit amputation with lateral wound with exposed bone, 3rd digit dactylitis, no drainage, no malodor, etiology 2/2 diabetic neuropathy      RADIOLOGY & ADDITIONAL STUDIES:

## 2022-05-17 NOTE — ED PROVIDER NOTE - OBJECTIVE STATEMENT
72M DMT2, HLD, CAD s/p stent on ASA/Plavix who presents with CC of R foot infection. Patient follows with Dr. Park (podiatrist) - has had a partial digit amputation of the second digit but a few days ago noticing increased drainage from the area. Denies pain, swelling, f/c. Had the area cultured in Dr. Park's office, and started on abx (unsure which one). Told to come to the ER because bone was visible. No other complaints at his time.

## 2022-05-17 NOTE — ED ADULT NURSE NOTE - CHIEF COMPLAINT
The patient is a 72y Male complaining of 
Skin normal color for race, warm, dry and intact. No evidence of rash.

## 2022-05-18 ENCOUNTER — TRANSCRIPTION ENCOUNTER (OUTPATIENT)
Age: 72
End: 2022-05-18

## 2022-05-18 LAB
A1C WITH ESTIMATED AVERAGE GLUCOSE RESULT: 7.6 % — HIGH (ref 4–5.6)
ERYTHROCYTE [SEDIMENTATION RATE] IN BLOOD: 4 MM/HR — SIGNIFICANT CHANGE UP (ref 0–20)
ESTIMATED AVERAGE GLUCOSE: 171 MG/DL — HIGH (ref 68–114)
GLUCOSE BLDC GLUCOMTR-MCNC: 133 MG/DL — HIGH (ref 70–99)
GLUCOSE BLDC GLUCOMTR-MCNC: 136 MG/DL — HIGH (ref 70–99)
GLUCOSE BLDC GLUCOMTR-MCNC: 155 MG/DL — HIGH (ref 70–99)
GLUCOSE BLDC GLUCOMTR-MCNC: 163 MG/DL — HIGH (ref 70–99)
GLUCOSE BLDC GLUCOMTR-MCNC: 174 MG/DL — HIGH (ref 70–99)
HCV AB S/CO SERPL IA: 0.24 S/CO — SIGNIFICANT CHANGE UP (ref 0–0.99)
HCV AB SERPL-IMP: SIGNIFICANT CHANGE UP
MRSA PCR RESULT.: SIGNIFICANT CHANGE UP
S AUREUS DNA NOSE QL NAA+PROBE: SIGNIFICANT CHANGE UP

## 2022-05-18 PROCEDURE — 93010 ELECTROCARDIOGRAM REPORT: CPT

## 2022-05-18 RX ORDER — ACETAMINOPHEN 500 MG
650 TABLET ORAL ONCE
Refills: 0 | Status: COMPLETED | OUTPATIENT
Start: 2022-05-18 | End: 2022-05-18

## 2022-05-18 RX ORDER — CHLORHEXIDINE GLUCONATE 213 G/1000ML
1 SOLUTION TOPICAL DAILY
Refills: 0 | Status: DISCONTINUED | OUTPATIENT
Start: 2022-05-18 | End: 2022-05-20

## 2022-05-18 RX ORDER — INSULIN LISPRO 100/ML
VIAL (ML) SUBCUTANEOUS EVERY 6 HOURS
Refills: 0 | Status: DISCONTINUED | OUTPATIENT
Start: 2022-05-18 | End: 2022-05-19

## 2022-05-18 RX ADMIN — RANOLAZINE 500 MILLIGRAM(S): 500 TABLET, FILM COATED, EXTENDED RELEASE ORAL at 17:28

## 2022-05-18 RX ADMIN — CLOPIDOGREL BISULFATE 75 MILLIGRAM(S): 75 TABLET, FILM COATED ORAL at 11:56

## 2022-05-18 RX ADMIN — Medication 81 MILLIGRAM(S): at 11:56

## 2022-05-18 RX ADMIN — Medication 2: at 14:04

## 2022-05-18 RX ADMIN — Medication 650 MILLIGRAM(S): at 16:03

## 2022-05-18 RX ADMIN — Medication 650 MILLIGRAM(S): at 16:33

## 2022-05-18 RX ADMIN — RANOLAZINE 500 MILLIGRAM(S): 500 TABLET, FILM COATED, EXTENDED RELEASE ORAL at 05:47

## 2022-05-18 RX ADMIN — HEPARIN SODIUM 5000 UNIT(S): 5000 INJECTION INTRAVENOUS; SUBCUTANEOUS at 14:04

## 2022-05-18 RX ADMIN — HEPARIN SODIUM 5000 UNIT(S): 5000 INJECTION INTRAVENOUS; SUBCUTANEOUS at 21:32

## 2022-05-18 RX ADMIN — Medication 25 MILLIGRAM(S): at 05:46

## 2022-05-18 RX ADMIN — CILOSTAZOL 50 MILLIGRAM(S): 100 TABLET ORAL at 17:28

## 2022-05-18 RX ADMIN — ATORVASTATIN CALCIUM 40 MILLIGRAM(S): 80 TABLET, FILM COATED ORAL at 21:31

## 2022-05-18 RX ADMIN — CILOSTAZOL 50 MILLIGRAM(S): 100 TABLET ORAL at 05:47

## 2022-05-18 RX ADMIN — HEPARIN SODIUM 5000 UNIT(S): 5000 INJECTION INTRAVENOUS; SUBCUTANEOUS at 05:48

## 2022-05-18 NOTE — PATIENT PROFILE ADULT - HISTORY OF COVID-19 VACCINATION
Yes V-Y Flap Text: The defect edges were debeveled with a #15 scalpel blade.  Given the location of the defect, shape of the defect and the proximity to free margins a V-Y flap was deemed most appropriate.  Using a sterile surgical marker, an appropriate advancement flap was drawn incorporating the defect and placing the expected incisions within the relaxed skin tension lines where possible.    The area thus outlined was incised deep to adipose tissue with a #15 scalpel blade.  The skin margins were undermined to an appropriate distance in all directions utilizing iris scissors.

## 2022-05-18 NOTE — CONSULT NOTE ADULT - SUBJECTIVE AND OBJECTIVE BOX
C A R D I O L O G Y  *********************    DATE OF SERVICE: 05-18-22    HISTORY OF PRESENT ILLNESS: HPI:  Patient is a 73 y/o Male well known to our office with PMH of CAD s/p stent, Type 2 DM, HTN, and HLD who is admitted with R foot wound. Cardiology consulted for preop clearance. Patient resting comfortably in no distress. Denies chest pain, SOB, palpitations, dizziness, or syncope.    PAST MEDICAL & SURGICAL HISTORY:  CAD (coronary artery disease)      Diabetes  Type 2, Controoled, Uncomplicated, A1c: 6.4      HLD (hyperlipidemia)      HTN (hypertension)      S/P primary angioplasty with coronary stent  x 4 stents      History of cholecystectomy      MEDICATIONS:  MEDICATIONS  (STANDING):  aspirin enteric coated 81 milliGRAM(s) Oral daily  atorvastatin 40 milliGRAM(s) Oral at bedtime  chlorhexidine 2% Cloths 1 Application(s) Topical daily  cilostazol 50 milliGRAM(s) Oral two times a day  clopidogrel Tablet 75 milliGRAM(s) Oral daily  dextrose 5%. 1000 milliLiter(s) (50 mL/Hr) IV Continuous <Continuous>  dextrose 5%. 1000 milliLiter(s) (100 mL/Hr) IV Continuous <Continuous>  dextrose 50% Injectable 25 Gram(s) IV Push once  dextrose 50% Injectable 12.5 Gram(s) IV Push once  dextrose 50% Injectable 25 Gram(s) IV Push once  glucagon  Injectable 1 milliGRAM(s) IntraMuscular once  heparin   Injectable 5000 Unit(s) SubCutaneous every 8 hours  insulin lispro (ADMELOG) corrective regimen sliding scale   SubCutaneous three times a day before meals  insulin lispro (ADMELOG) corrective regimen sliding scale   SubCutaneous at bedtime  metoprolol succinate ER 25 milliGRAM(s) Oral daily  ranolazine 500 milliGRAM(s) Oral two times a day      Allergies    No Known Allergies    Intolerances        FAMILY HISTORY:  Family history of diabetes mellitus in brother (Sibling)      Non-contributary for premature coronary disease or sudden cardiac death    SOCIAL HISTORY:    [x ] Non-smoker  [ ] Smoker  [ ] Alcohol    FLU VACCINE THIS YEAR STARTS IN AUGUST:  [ ] Yes    [ ] No    IF OVER 65 HAVE YOU EVER HAD A PNA VACCINE:  [ ] Yes    [ ] No       [ ] N/A      REVIEW OF SYSTEMS:  [ ]chest pain  [  ]shortness of breath  [  ]palpitations  [  ]syncope  [ ]near syncope [ ]upper extremity weakness   [ ] lower extremity weakness  [  ]diplopia  [  ]altered mental status   [  ]fevers  [ ]chills [ ]nausea  [ ]vomiting  [  ]dysphagia    [ ]abdominal pain  [ ]melena  [ ]BRBPR    [  ]epistaxis  [  ]rash    [ ]lower extremity edema    +R foot wound    [X] All others negative	  [ ] Unable to obtain      LABS:	 	    CARDIAC MARKERS:                              13.0   4.88  )-----------( 232      ( 17 May 2022 15:19 )             39.1     Hb Trend:     05-17    143  |  104  |  18  ----------------------------<  76  4.5   |  26  |  0.92    Ca    10.0      17 May 2022 15:20    TPro  7.4  /  Alb  4.4  /  TBili  0.5  /  DBili  x   /  AST  21  /  ALT  19  /  AlkPhos  90  05-17    Creatinine Trend: 0.92<--    Coags:      proBNP:   Lipid Profile:   HgA1c:   TSH:         PHYSICAL EXAM:  T(C): 36.5 (05-18-22 @ 13:10), Max: 36.7 (05-17-22 @ 21:05)  HR: 79 (05-18-22 @ 13:10) (73 - 97)  BP: 124/74 (05-18-22 @ 13:10) (114/73 - 135/79)  RR: 18 (05-18-22 @ 13:10) (16 - 18)  SpO2: 99% (05-18-22 @ 13:10) (95% - 99%)  Wt(kg): --   BMI (kg/m2): 22.6 (05-17-22 @ 21:05)  I&O's Summary      Gen: Appears well in NAD  HEENT:  (-)icterus (-)pallor  CV: N S1 S2 1/6 SHERRY (+)2 Pulses B/l  Resp:  Clear to auscultation B/L, normal effort  GI: (+) BS Soft, NT, ND  Lymph:  (-)Edema, (-)obvious lymphadenopathy  Skin: Warm to touch, Normal turgor  Psych: Appropriate mood and affect      TELEMETRY: None	      ECG: Pending (ordered)  	    RADIOLOGY:         CXR: None    ASSESSMENT/PLAN: Patient is a 73 y/o Male well known to our office with PMH of CAD s/p stent, Type 2 DM, HTN, and HLD who is admitted with R foot wound. Cardiology consulted for preop clearance.    - No evidence of clinical HF or anginal symptoms  - Please check preop EKG (ordered)  - Patient with recent cardiac w/u in our office including Exercise NST with no ischemia in 3/2022 and TTE with normal LV function in 1/2022  - Podiatry/Vascular follow up - FABBY/PVR pending, plan for R foot partial 3rd ray resection tomorrow  - Okay to temporarily hold antiplatelets for OR if necessary from CV perspective  - Continue perioperative beta blockers  - Based on patient's RCRI score, would consider him moderate cardiac risk for planned procedures. However, patient is optimized from CV perspective as long as EKG with no sig abnormalities  - No repeat cardiac testing needed at this time prior to OR  - Patient to f/u in our office with Dr. Mckeon after d/c    Moris Villalta PA-C  Pager: [phone]   C A R D I O L O G Y  *********************    DATE OF SERVICE: 05-18-22    HISTORY OF PRESENT ILLNESS: HPI:  Patient is a 73 y/o Male well known to our office with PMH of CAD s/p stent, Type 2 DM, HTN, and HLD who is admitted with R foot wound. Cardiology consulted for preop clearance. Patient resting comfortably in no distress. Denies chest pain, SOB, palpitations, dizziness, or syncope.    PAST MEDICAL & SURGICAL HISTORY:  CAD (coronary artery disease)      Diabetes  Type 2, Controoled, Uncomplicated, A1c: 6.4      HLD (hyperlipidemia)      HTN (hypertension)      S/P primary angioplasty with coronary stent  x 4 stents      History of cholecystectomy      MEDICATIONS:  MEDICATIONS  (STANDING):  aspirin enteric coated 81 milliGRAM(s) Oral daily  atorvastatin 40 milliGRAM(s) Oral at bedtime  chlorhexidine 2% Cloths 1 Application(s) Topical daily  cilostazol 50 milliGRAM(s) Oral two times a day  clopidogrel Tablet 75 milliGRAM(s) Oral daily  dextrose 5%. 1000 milliLiter(s) (50 mL/Hr) IV Continuous <Continuous>  dextrose 5%. 1000 milliLiter(s) (100 mL/Hr) IV Continuous <Continuous>  dextrose 50% Injectable 25 Gram(s) IV Push once  dextrose 50% Injectable 12.5 Gram(s) IV Push once  dextrose 50% Injectable 25 Gram(s) IV Push once  glucagon  Injectable 1 milliGRAM(s) IntraMuscular once  heparin   Injectable 5000 Unit(s) SubCutaneous every 8 hours  insulin lispro (ADMELOG) corrective regimen sliding scale   SubCutaneous three times a day before meals  insulin lispro (ADMELOG) corrective regimen sliding scale   SubCutaneous at bedtime  metoprolol succinate ER 25 milliGRAM(s) Oral daily  ranolazine 500 milliGRAM(s) Oral two times a day      Allergies    No Known Allergies    Intolerances        FAMILY HISTORY:  Family history of diabetes mellitus in brother (Sibling)      Non-contributary for premature coronary disease or sudden cardiac death    SOCIAL HISTORY:    [x ] Non-smoker  [ ] Smoker  [ ] Alcohol    FLU VACCINE THIS YEAR STARTS IN AUGUST:  [ ] Yes    [ ] No    IF OVER 65 HAVE YOU EVER HAD A PNA VACCINE:  [ ] Yes    [ ] No       [ ] N/A      REVIEW OF SYSTEMS:  [ ]chest pain  [  ]shortness of breath  [  ]palpitations  [  ]syncope  [ ]near syncope [ ]upper extremity weakness   [ ] lower extremity weakness  [  ]diplopia  [  ]altered mental status   [  ]fevers  [ ]chills [ ]nausea  [ ]vomiting  [  ]dysphagia    [ ]abdominal pain  [ ]melena  [ ]BRBPR    [  ]epistaxis  [  ]rash    [ ]lower extremity edema    +R foot wound    [X] All others negative	  [ ] Unable to obtain      LABS:	 	    CARDIAC MARKERS:                              13.0   4.88  )-----------( 232      ( 17 May 2022 15:19 )             39.1     Hb Trend:     05-17    143  |  104  |  18  ----------------------------<  76  4.5   |  26  |  0.92    Ca    10.0      17 May 2022 15:20    TPro  7.4  /  Alb  4.4  /  TBili  0.5  /  DBili  x   /  AST  21  /  ALT  19  /  AlkPhos  90  05-17    Creatinine Trend: 0.92<--          PHYSICAL EXAM:  T(C): 36.5 (05-18-22 @ 13:10), Max: 36.7 (05-17-22 @ 21:05)  HR: 79 (05-18-22 @ 13:10) (73 - 97)  BP: 124/74 (05-18-22 @ 13:10) (114/73 - 135/79)  RR: 18 (05-18-22 @ 13:10) (16 - 18)  SpO2: 99% (05-18-22 @ 13:10) (95% - 99%)  Wt(kg): --   BMI (kg/m2): 22.6 (05-17-22 @ 21:05)  I&O's Summary      Gen: Appears well in NAD  HEENT:  (-)icterus (-)pallor  CV: N S1 S2 1/6 SHERRY (+)2 Pulses B/l  Resp:  Clear to auscultation B/L, normal effort  GI: (+) BS Soft, NT, ND  Lymph:  (-)Edema, (-)obvious lymphadenopathy  Skin: Warm to touch, Normal turgor  Psych: Appropriate mood and affect      TELEMETRY: None	      ECG: Pending (ordered)  	    RADIOLOGY:         CXR: None    ASSESSMENT/PLAN: Patient is a 73 y/o Male well known to our office with PMH of CAD s/p stent, Type 2 DM, HTN, and HLD who is admitted with R foot wound. Cardiology consulted for preop clearance.    - No evidence of clinical HF or anginal symptoms  - Please check preop EKG (ordered)  - Patient with recent cardiac w/u in our office including Exercise NST with no ischemia in 3/2022 and TTE with normal LV function in 1/2022  - Podiatry/Vascular follow up - FABBY/PVR pending, plan for R foot partial 3rd ray resection tomorrow  - Okay to temporarily hold antiplatelets for OR if necessary from CV perspective  - Continue perioperative beta blockers  - Based on patient's RCRI score, would consider him moderate cardiac risk for planned procedures. However, patient is optimized from CV perspective as long as EKG with no sig abnormalities  - No repeat cardiac testing needed at this time prior to OR  - Patient to f/u in our office with Dr. Mckeon after d/c    Moris Villalta PA-C  Pager: [phone]

## 2022-05-18 NOTE — CONSULT NOTE ADULT - ASSESSMENT
Patient seen and examined, agree with above assessment and plan as transcribed above.    - Moderate cardiac risk for the planned procedure  - No clinical CHF  - cont BB    Jorden Anthony MD, [facility]  BEEPER [phone]

## 2022-05-18 NOTE — CONSULT NOTE ADULT - ASSESSMENT
Assessment:  72y Male with right 3rd digit non healing wound 1 month post partial toe amputation, with concerns for osteomyelitis. Vascular called to evaluate peripheral vasculature prior to podiatry intervention.     Plan:  - reviewed outpatient records patient had RLE peroneal atherectomy and plasty 3 months ago with Dr Childs  - no further vascular intervention at this time  - plan for RLE 3rd ray resection with podiatry tomorrow  - patient can follow up in office with Dr Childs  - plan dw Dr Childs    Vascular  [id number]

## 2022-05-18 NOTE — CONSULT NOTE ADULT - SUBJECTIVE AND OBJECTIVE BOX
[facility] [id number]  72y Male  1d    HPI:  72M DMT2, HLD, CAD s/p stent on ASA/Plavix who presents with CC of R foot infection.   Patient had a partial digit amputation of the second digit but a few days ago noticing increased drainage from the area. Denies pain, swelling, f/c. Had the area cultured in Dr. Park's office, and started on abx (unsure which one). Patient was referred to ED for progressively worsening wound.    No chest pain/ palpitations/ SOB   ROS other wise negative      (17 May 2022 21:05)    Vascular surgery was consulted for evaluate RLE vasculature prior to planned podiatry 3rd ray resection. Patient had RLE angio with peroneal atherectomy and plasty 3 months ago in office. FABBY showed improvement. Patient has intact pedal arch/DP/PT.      PAST MEDICAL & SURGICAL HISTORY:  CAD (coronary artery disease)      Diabetes  Type 2, Controoled, Uncomplicated, A1c: 6.4      HLD (hyperlipidemia)      HTN (hypertension)      S/P primary angioplasty with coronary stent  x 4 stents      History of cholecystectomy            MEDICATIONS  (STANDING):  aspirin enteric coated 81 milliGRAM(s) Oral daily  atorvastatin 40 milliGRAM(s) Oral at bedtime  chlorhexidine 2% Cloths 1 Application(s) Topical daily  cilostazol 50 milliGRAM(s) Oral two times a day  clopidogrel Tablet 75 milliGRAM(s) Oral daily  dextrose 5%. 1000 milliLiter(s) (50 mL/Hr) IV Continuous <Continuous>  dextrose 5%. 1000 milliLiter(s) (100 mL/Hr) IV Continuous <Continuous>  dextrose 50% Injectable 25 Gram(s) IV Push once  dextrose 50% Injectable 12.5 Gram(s) IV Push once  dextrose 50% Injectable 25 Gram(s) IV Push once  glucagon  Injectable 1 milliGRAM(s) IntraMuscular once  heparin   Injectable 5000 Unit(s) SubCutaneous every 8 hours  insulin lispro (ADMELOG) corrective regimen sliding scale   SubCutaneous three times a day before meals  insulin lispro (ADMELOG) corrective regimen sliding scale   SubCutaneous at bedtime  metoprolol succinate ER 25 milliGRAM(s) Oral daily  ranolazine 500 milliGRAM(s) Oral two times a day    MEDICATIONS  (PRN):  dextrose Oral Gel 15 Gram(s) Oral once PRN Blood Glucose LESS THAN 70 milliGRAM(s)/deciliter      Allergies    No Known Allergies    Intolerances        REVIEW OF SYSTEMS    [x ] A ten-point review of systems was otherwise negative except as noted.  [ ] Due to altered mental status/intubation, subjective information were not able to be obtained from the patient. History was obtained, to the extent possible, from review of the chart and collateral sources of information.      Vital Signs Last 24 Hrs  T(C): 36.5 (18 May 2022 13:10), Max: 36.7 (17 May 2022 21:05)  T(F): 97.7 (18 May 2022 13:10), Max: 98.1 (18 May 2022 11:57)  HR: 79 (18 May 2022 13:10) (73 - 97)  BP: 124/74 (18 May 2022 13:10) (114/73 - 135/79)  BP(mean): 97 (17 May 2022 21:05) (97 - 97)  RR: 18 (18 May 2022 13:10) (16 - 18)  SpO2: 99% (18 May 2022 13:10) (95% - 99%)    PHYSICAL EXAM:  GENERAL: NAD, well-appearing  EXTREMITIES: palp fem, dopplerable DP/PT b/l, right 3rd digit small wound probes to bone    LABS:  Labs:  CAPILLARY BLOOD GLUCOSE      POCT Blood Glucose.: 155 mg/dL (18 May 2022 14:00)  POCT Blood Glucose.: 133 mg/dL (18 May 2022 09:33)  POCT Blood Glucose.: 141 mg/dL (17 May 2022 22:31)                          13.0   4.88  )-----------( 232      ( 17 May 2022 15:19 )             39.1         05-17    143  |  104  |  18  ----------------------------<  76  4.5   |  26  |  0.92          LFTs:             7.4  | 0.5  | 21       ------------------[90      ( 17 May 2022 15:20 )  4.4  | x    | 19          Lipase:x      Amylase:x         Blood Gas Venous - Lactate: 1.7 mmol/L (05-17-22 @ 14:54)      Coags:      RADIOLOGY & ADDITIONAL STUDIES:  FABBY PVR reviewed

## 2022-05-18 NOTE — PATIENT PROFILE ADULT - FALL HARM RISK - HARM RISK INTERVENTIONS

## 2022-05-19 ENCOUNTER — RESULT REVIEW (OUTPATIENT)
Age: 72
End: 2022-05-19

## 2022-05-19 ENCOUNTER — TRANSCRIPTION ENCOUNTER (OUTPATIENT)
Age: 72
End: 2022-05-19

## 2022-05-19 LAB
ANION GAP SERPL CALC-SCNC: 12 MMOL/L — SIGNIFICANT CHANGE UP (ref 5–17)
BLD GP AB SCN SERPL QL: NEGATIVE — SIGNIFICANT CHANGE UP
BUN SERPL-MCNC: 20 MG/DL — SIGNIFICANT CHANGE UP (ref 7–23)
CALCIUM SERPL-MCNC: 9.4 MG/DL — SIGNIFICANT CHANGE UP (ref 8.4–10.5)
CHLORIDE SERPL-SCNC: 101 MMOL/L — SIGNIFICANT CHANGE UP (ref 96–108)
CO2 SERPL-SCNC: 23 MMOL/L — SIGNIFICANT CHANGE UP (ref 22–31)
CREAT SERPL-MCNC: 0.75 MG/DL — SIGNIFICANT CHANGE UP (ref 0.5–1.3)
EGFR: 96 ML/MIN/1.73M2 — SIGNIFICANT CHANGE UP
GLUCOSE BLDC GLUCOMTR-MCNC: 108 MG/DL — HIGH (ref 70–99)
GLUCOSE BLDC GLUCOMTR-MCNC: 132 MG/DL — HIGH (ref 70–99)
GLUCOSE BLDC GLUCOMTR-MCNC: 176 MG/DL — HIGH (ref 70–99)
GLUCOSE BLDC GLUCOMTR-MCNC: 201 MG/DL — HIGH (ref 70–99)
GLUCOSE SERPL-MCNC: 184 MG/DL — HIGH (ref 70–99)
HCT VFR BLD CALC: 41.4 % — SIGNIFICANT CHANGE UP (ref 39–50)
HGB BLD-MCNC: 13.9 G/DL — SIGNIFICANT CHANGE UP (ref 13–17)
INR BLD: 1.06 RATIO — SIGNIFICANT CHANGE UP (ref 0.88–1.16)
MCHC RBC-ENTMCNC: 30.3 PG — SIGNIFICANT CHANGE UP (ref 27–34)
MCHC RBC-ENTMCNC: 33.6 GM/DL — SIGNIFICANT CHANGE UP (ref 32–36)
MCV RBC AUTO: 90.4 FL — SIGNIFICANT CHANGE UP (ref 80–100)
NRBC # BLD: 0 /100 WBCS — SIGNIFICANT CHANGE UP (ref 0–0)
PLATELET # BLD AUTO: 225 K/UL — SIGNIFICANT CHANGE UP (ref 150–400)
POTASSIUM SERPL-MCNC: 3.9 MMOL/L — SIGNIFICANT CHANGE UP (ref 3.5–5.3)
POTASSIUM SERPL-SCNC: 3.9 MMOL/L — SIGNIFICANT CHANGE UP (ref 3.5–5.3)
PROTHROM AB SERPL-ACNC: 12.3 SEC — SIGNIFICANT CHANGE UP (ref 10.5–13.4)
RBC # BLD: 4.58 M/UL — SIGNIFICANT CHANGE UP (ref 4.2–5.8)
RBC # FLD: 12.8 % — SIGNIFICANT CHANGE UP (ref 10.3–14.5)
RH IG SCN BLD-IMP: NEGATIVE — SIGNIFICANT CHANGE UP
SODIUM SERPL-SCNC: 136 MMOL/L — SIGNIFICANT CHANGE UP (ref 135–145)
WBC # BLD: 3.67 K/UL — LOW (ref 3.8–10.5)
WBC # FLD AUTO: 3.67 K/UL — LOW (ref 3.8–10.5)

## 2022-05-19 PROCEDURE — 88304 TISSUE EXAM BY PATHOLOGIST: CPT | Mod: 26

## 2022-05-19 PROCEDURE — 88311 DECALCIFY TISSUE: CPT | Mod: 26

## 2022-05-19 PROCEDURE — 73630 X-RAY EXAM OF FOOT: CPT | Mod: 26,RT

## 2022-05-19 PROCEDURE — 88305 TISSUE EXAM BY PATHOLOGIST: CPT | Mod: 26

## 2022-05-19 RX ORDER — HYDROMORPHONE HYDROCHLORIDE 2 MG/ML
0.25 INJECTION INTRAMUSCULAR; INTRAVENOUS; SUBCUTANEOUS
Refills: 0 | Status: DISCONTINUED | OUTPATIENT
Start: 2022-05-19 | End: 2022-05-19

## 2022-05-19 RX ORDER — SODIUM CHLORIDE 9 MG/ML
1000 INJECTION, SOLUTION INTRAVENOUS
Refills: 0 | Status: DISCONTINUED | OUTPATIENT
Start: 2022-05-19 | End: 2022-05-19

## 2022-05-19 RX ORDER — INSULIN LISPRO 100/ML
VIAL (ML) SUBCUTANEOUS AT BEDTIME
Refills: 0 | Status: DISCONTINUED | OUTPATIENT
Start: 2022-05-19 | End: 2022-05-20

## 2022-05-19 RX ORDER — INSULIN LISPRO 100/ML
VIAL (ML) SUBCUTANEOUS
Refills: 0 | Status: DISCONTINUED | OUTPATIENT
Start: 2022-05-19 | End: 2022-05-20

## 2022-05-19 RX ADMIN — Medication 1: at 00:16

## 2022-05-19 RX ADMIN — RANOLAZINE 500 MILLIGRAM(S): 500 TABLET, FILM COATED, EXTENDED RELEASE ORAL at 18:37

## 2022-05-19 RX ADMIN — Medication 1: at 06:13

## 2022-05-19 RX ADMIN — RANOLAZINE 500 MILLIGRAM(S): 500 TABLET, FILM COATED, EXTENDED RELEASE ORAL at 06:01

## 2022-05-19 RX ADMIN — Medication 25 MILLIGRAM(S): at 10:09

## 2022-05-19 RX ADMIN — CILOSTAZOL 50 MILLIGRAM(S): 100 TABLET ORAL at 10:09

## 2022-05-19 RX ADMIN — ATORVASTATIN CALCIUM 40 MILLIGRAM(S): 80 TABLET, FILM COATED ORAL at 21:58

## 2022-05-19 RX ADMIN — HEPARIN SODIUM 5000 UNIT(S): 5000 INJECTION INTRAVENOUS; SUBCUTANEOUS at 21:58

## 2022-05-19 RX ADMIN — CILOSTAZOL 50 MILLIGRAM(S): 100 TABLET ORAL at 18:37

## 2022-05-19 NOTE — BRIEF OPERATIVE NOTE - COMMENTS
Pt is s/p R foot partial third ray resection  - Low concern for residual bone and soft tissue infection  - High concern for viaibility  - Pt will be stable for discharge from podiatry after AM dressing change  - Will f/u OR pathology and culture data outpt

## 2022-05-19 NOTE — BRIEF OPERATIVE NOTE - SPECIMENS
Pathology: R third toe dirty soft tissue and bone, right third metatarsal clean bone margin MIcro: right third metatarsal clean bone margin

## 2022-05-19 NOTE — DISCHARGE NOTE PROVIDER - CARE PROVIDER_API CALL
Luc Anaya (DPM)  Surgery Orthopaedic Surgery  [address], [address]  [place] [address]  Phone: [phone]  Fax: [phone]  Follow Up Time:

## 2022-05-19 NOTE — BRIEF OPERATIVE NOTE - OPERATION/FINDINGS
Disarticulation of R third toe stump at 3rd MPJ, resection of third metatarsal head, bone stock was of adequate density at level of resection. Soft tissue structures appeared viable at level of resection. No purulence or tracking sinus tracks. Low concern for residual infection, high concern for viability.

## 2022-05-19 NOTE — BRIEF OPERATIVE NOTE - NSICDXBRIEFPROCEDURE_GEN_ALL_CORE_FT
PROCEDURES:  Partial amputation of third ray of right foot by open approach 19-May-2022 15:41:36  Evens Boykin

## 2022-05-19 NOTE — PRE-ANESTHESIA EVALUATION ADULT - NSANTHOSAYNRD_GEN_A_CORE
No. JOSE RAMON screening performed.  STOP BANG Legend: 0-2 = LOW Risk; 3-4 = INTERMEDIATE Risk; 5-8 = HIGH Risk

## 2022-05-19 NOTE — DISCHARGE NOTE PROVIDER - NSDCFUADDINST_GEN_ALL_CORE_FT
Podiatry Discharge Instructions:  Follow up: Please follow up with Dr. Luc Anaya within 1 week of discharge from the hospital, please call [phone] for appointment and discuss that you recently were seen in the hospital.  Wound Care: Please leave your dressing clean dry intact until your follow up appointment.  Weight bearing: Please weight bear as tolerated in a surgical shoe with weight distributed to right heel  Antibiotics: Please continue as instructed.

## 2022-05-19 NOTE — DISCHARGE NOTE PROVIDER - HOSPITAL COURSE
72M DMT2, HLD, CAD s/p stent on ASA/Plavix who presents with CC of R foot infection. Patient follows with Dr. Park (podiatrist) - has had a partial digit amputation of the second digit but a few days ago noticing increased drainage from the area. Denies pain, swelling, f/c. Had the area cultured in Dr. Park's office, and started on abx (unsure which one). Told to come to the ER because bone was visible. No other complaints at his time.    # Rt foot wound R foot XR wet read: 3rd proximal phalanx distal lateral bony erosion  right foot partial 3rd digit amputation with lateral wound with exposed bone,   Podiatry consulted   Plan for OR tomorrow     Patient medically optimized for the procedure   Vascular surgery consult appreciated   Plan for right foot partial 3rd ray resection today     # DM HISS follow up A1C 7.6     #CAD s/p stent Aspirin/ Plavix  Cards pre op evaluation appreciated    Rt foot partial 3rd ray resection on 5/19 and per podiatry cleared for discharge   On 5/20 pt. dc'd after discussion with Dr. Noble 72M DMT2, HLD, CAD s/p stent on ASA/Plavix who presents with CC of R foot infection. Patient follows with Dr. Park (podiatrist) - has had a partial digit amputation of the second digit but a few days ago noticing increased drainage from the area. Denies pain, swelling, f/c. Had the area cultured in Dr. Park's office, and started on abx (unsure which one). Told to come to the ER because bone was visible. No other complaints at his time.    # Rt foot wound R foot XR wet read: 3rd proximal phalanx distal lateral bony erosion  right foot partial 3rd digit amputation with lateral wound with exposed bone,   Podiatry consulted and OR on 5/19; medically optimized for the procedure   Vascular surgery consult followed  S/p right foot partial 3rd ray resection today     # DM HISS follow up A1C 7.6     #CAD s/p stent Aspirin/ Plavix  Cards pre op eval. completed    Rt foot partial 3rd ray resection on 5/19 and per podiatry cleared for discharge   On 5/20 pt. dc'd after discussion with Dr. Noble 72M DMT2, HLD, CAD s/p stent on ASA/Plavix who presents with CC of R foot infection. Patient follows with Dr. Park (podiatrist) - has had a partial digit amputation of the second digit but a few days ago noticing increased drainage from the area. Denies pain, swelling, f/c. Had the area cultured in Dr. Park's office, and started on abx (unsure which one). Told to come to the ER because bone was visible. No other complaints at his time.    # Rt foot wound R foot XR wet read: 3rd proximal phalanx distal lateral bony erosion  right foot partial 3rd digit amputation with lateral wound with exposed bone,   Podiatry consulted and OR on 5/19; medically optimized for the procedure   s/p R foot partial third ray resection: skin flaps warm and edges well coapted, sutures intact, mild sanguinous ooze no active bleed, no signs of hematoma  low concern for residual infection, high concern for viability - OR pathology and culture data pending, to be followed up outpt  PT eval & WBAT to right foot with weight distributed to heel (post-op shoe delivered) / stable from podiatry for d/c on PO Augmentin 875 BID x7 days  Vascular surgery consult and followed     # DM HISS follow up A1C 7.6     # CAD s/p stent Aspirin/ Plavix  Cards pre op eval. completed    Rt foot partial 3rd ray resection on 5/19 and per podiatry cleared for discharge   On 5/20 pt. dc'd after discussion with Dr. Noble   / Podiatry 72M DMT2, HLD, CAD s/p stent on ASA/Plavix who presents with CC of R foot infection. Patient follows with Dr. Park (podiatrist) - has had a partial digit amputation of the second digit but a few days ago noticing increased drainage from the area. Denies pain, swelling, f/c. Had the area cultured in Dr. Park's office, and started on abx (unsure which one). Told to come to the ER because bone was visible. No other complaints at his time.      Time spent coordinating discharge plan 43 minutes   Informed PMD   # Rt foot wound R foot XR wet read: 3rd proximal phalanx distal lateral bony erosion  right foot partial 3rd digit amputation with lateral wound with exposed bone,   Podiatry consulted and OR on 5/19; medically optimized for the procedure   s/p R foot partial third ray resection: skin flaps warm and edges well coapted, sutures intact, mild sanguinous ooze no active bleed, no signs of hematoma  low concern for residual infection, high concern for viability - OR pathology and culture data pending, to be followed up outpt  PT eval & WBAT to right foot with weight distributed to heel (post-op shoe delivered) / stable from podiatry for d/c on PO Augmentin 875 BID x7 days  Vascular surgery consult and followed     # DM HISS follow up A1C 7.6     # CAD s/p stent Aspirin/ Plavix  Cards pre op eval. completed    Rt foot partial 3rd ray resection on 5/19 and per podiatry cleared for discharge   On 5/20 pt. dc'd after discussion with Dr. Noble   / Podiatry

## 2022-05-19 NOTE — DISCHARGE NOTE PROVIDER - NSDCMRMEDTOKEN_GEN_ALL_CORE_FT
acetaminophen 325 mg oral tablet: 2 tab(s) orally every 6 hours, As needed, Mild and moderate Pain (1 - 6)  Augmentin 875 mg-125 mg oral tablet: 1 tab(s) orally every 12 hours x6 days  Sameer Low Dose 81 mg oral delayed release tablet: 1 tab(s) orally once a day  cilostazol 50 mg oral tablet: 1 tab(s) orally 2 times a day  glipiZIDE 10 mg oral tablet, extended release: 1 tab(s) orally once a day (in the evening)  Invokana 100 mg oral tablet: 1 tab(s) orally once a day  Januvia 100 mg oral tablet: 1 tab(s) orally once a day  lactobacillus acidophilus oral capsule: 1 cap(s) orally 3 times a day (with meals)  metFORMIN 1000 mg oral tablet: 1 tab(s) orally 2 times a day  metoprolol succinate 25 mg oral tablet, extended release: 1 tab(s) orally once a day  multivitamin: 1 tab(s) orally once a day  Plavix 75 mg oral tablet: 1 tab(s) orally once a day (in the evening)  Ranexa 500 mg oral tablet, extended release: 1 tab(s) orally 2 times a day  senna oral tablet: 2 tab(s) orally once a day (at bedtime)  simvastatin 80 mg oral tablet: 1 tab(s) orally once a day (at bedtime)   3:1 Commode : Dx  - right foot infection   ICD MO1.X71  acetaminophen 325 mg oral tablet: 2 tab(s) orally every 6 hours, As needed, Mild and moderate Pain (1 - 6)  Augmentin 875 mg-125 mg oral tablet: 1 tab(s) orally every 12 hours x6 days  Sameer Low Dose 81 mg oral delayed release tablet: 1 tab(s) orally once a day  cilostazol 50 mg oral tablet: 1 tab(s) orally 2 times a day  glipiZIDE 10 mg oral tablet, extended release: 1 tab(s) orally once a day (in the evening)  Invokana 100 mg oral tablet: 1 tab(s) orally once a day  Januvia 100 mg oral tablet: 1 tab(s) orally once a day  lactobacillus acidophilus oral capsule: 1 cap(s) orally 3 times a day (with meals)  metFORMIN 1000 mg oral tablet: 1 tab(s) orally 2 times a day  metoprolol succinate 25 mg oral tablet, extended release: 1 tab(s) orally once a day  multivitamin: 1 tab(s) orally once a day  Plavix 75 mg oral tablet: 1 tab(s) orally once a day (in the evening)  Ranexa 500 mg oral tablet, extended release: 1 tab(s) orally 2 times a day  senna oral tablet: 2 tab(s) orally once a day (at bedtime)  simvastatin 80 mg oral tablet: 1 tab(s) orally once a day (at bedtime)   acetaminophen 325 mg oral tablet: 2 tab(s) orally every 6 hours, As needed, Mild and moderate Pain (1 - 6)  amoxicillin-clavulanate 875 mg-125 mg oral tablet: 875 milligram(s) orally every 12 hours   Sameer Low Dose 81 mg oral delayed release tablet: 1 tab(s) orally once a day  cilostazol 50 mg oral tablet: 1 tab(s) orally 2 times a day  glipiZIDE 10 mg oral tablet, extended release: 1 tab(s) orally once a day (in the evening)  Invokana 100 mg oral tablet: 1 tab(s) orally once a day  Januvia 100 mg oral tablet: 1 tab(s) orally once a day  lactobacillus acidophilus oral capsule: 1 cap(s) orally 3 times a day (with meals)  metFORMIN 1000 mg oral tablet: 1 tab(s) orally 2 times a day  metoprolol succinate 25 mg oral tablet, extended release: 1 tab(s) orally once a day  multivitamin: 1 tab(s) orally once a day  Plavix 75 mg oral tablet: 1 tab(s) orally once a day (in the evening)  Ranexa 500 mg oral tablet, extended release: 1 tab(s) orally 2 times a day  senna oral tablet: 2 tab(s) orally once a day (at bedtime)  simvastatin 80 mg oral tablet: 1 tab(s) orally once a day (at bedtime)

## 2022-05-19 NOTE — DISCHARGE NOTE PROVIDER - NSDCCPCAREPLAN_GEN_ALL_CORE_FT
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,   Podiatry consulted and OR on 5/19; medically optimized for the procedure   s/p R foot partial third ray resection: skin flaps warm and edges well coapted, sutures intact, mild sanguinous ooze no active bleed, no signs of hematoma  low concern for residual infection, high concern for viability - OR pathology and culture data pending, to be followed up outpt  PT eval & WBAT to right foot with weight distributed to heel (post-op shoe delivered) / stable from podiatry for d/c on PO Augmentin 875 BID x7 days  Vascular surgery consult and followed         SECONDARY DISCHARGE DIAGNOSES  Diagnosis: Diabetes mellitus  Assessment and Plan of Treatment: # DM HISS follow up A1C 7.6    Diagnosis: CAD (coronary artery disease)  Assessment and Plan of Treatment: #CAD s/p stent Aspirin/ Plavix  Cards pre op eval. completed

## 2022-05-20 ENCOUNTER — TRANSCRIPTION ENCOUNTER (OUTPATIENT)
Age: 72
End: 2022-05-20

## 2022-05-20 VITALS
DIASTOLIC BLOOD PRESSURE: 69 MMHG | RESPIRATION RATE: 18 BRPM | OXYGEN SATURATION: 98 % | SYSTOLIC BLOOD PRESSURE: 113 MMHG | TEMPERATURE: 98 F | HEART RATE: 87 BPM

## 2022-05-20 DIAGNOSIS — R07.9 CHEST PAIN, UNSPECIFIED: ICD-10-CM

## 2022-05-20 DIAGNOSIS — I10 ESSENTIAL (PRIMARY) HYPERTENSION: ICD-10-CM

## 2022-05-20 DIAGNOSIS — I25.10 ATHEROSCLEROTIC HEART DISEASE OF NATIVE CORONARY ARTERY WITHOUT ANGINA PECTORIS: ICD-10-CM

## 2022-05-20 LAB
ANION GAP SERPL CALC-SCNC: 10 MMOL/L — SIGNIFICANT CHANGE UP (ref 5–17)
BUN SERPL-MCNC: 20 MG/DL — SIGNIFICANT CHANGE UP (ref 7–23)
CALCIUM SERPL-MCNC: 9.5 MG/DL — SIGNIFICANT CHANGE UP (ref 8.4–10.5)
CHLORIDE SERPL-SCNC: 103 MMOL/L — SIGNIFICANT CHANGE UP (ref 96–108)
CO2 SERPL-SCNC: 24 MMOL/L — SIGNIFICANT CHANGE UP (ref 22–31)
CREAT SERPL-MCNC: 1 MG/DL — SIGNIFICANT CHANGE UP (ref 0.5–1.3)
EGFR: 80 ML/MIN/1.73M2 — SIGNIFICANT CHANGE UP
GLUCOSE BLDC GLUCOMTR-MCNC: 194 MG/DL — HIGH (ref 70–99)
GLUCOSE BLDC GLUCOMTR-MCNC: 244 MG/DL — HIGH (ref 70–99)
GLUCOSE SERPL-MCNC: 171 MG/DL — HIGH (ref 70–99)
GRAM STN FLD: SIGNIFICANT CHANGE UP
HCT VFR BLD CALC: 39.3 % — SIGNIFICANT CHANGE UP (ref 39–50)
HGB BLD-MCNC: 13.2 G/DL — SIGNIFICANT CHANGE UP (ref 13–17)
MCHC RBC-ENTMCNC: 30.8 PG — SIGNIFICANT CHANGE UP (ref 27–34)
MCHC RBC-ENTMCNC: 33.6 GM/DL — SIGNIFICANT CHANGE UP (ref 32–36)
MCV RBC AUTO: 91.6 FL — SIGNIFICANT CHANGE UP (ref 80–100)
NRBC # BLD: 0 /100 WBCS — SIGNIFICANT CHANGE UP (ref 0–0)
PLATELET # BLD AUTO: 217 K/UL — SIGNIFICANT CHANGE UP (ref 150–400)
POTASSIUM SERPL-MCNC: 4.1 MMOL/L — SIGNIFICANT CHANGE UP (ref 3.5–5.3)
POTASSIUM SERPL-SCNC: 4.1 MMOL/L — SIGNIFICANT CHANGE UP (ref 3.5–5.3)
RBC # BLD: 4.29 M/UL — SIGNIFICANT CHANGE UP (ref 4.2–5.8)
RBC # FLD: 12.9 % — SIGNIFICANT CHANGE UP (ref 10.3–14.5)
SODIUM SERPL-SCNC: 137 MMOL/L — SIGNIFICANT CHANGE UP (ref 135–145)
SPECIMEN SOURCE: SIGNIFICANT CHANGE UP
WBC # BLD: 5.3 K/UL — SIGNIFICANT CHANGE UP (ref 3.8–10.5)
WBC # FLD AUTO: 5.3 K/UL — SIGNIFICANT CHANGE UP (ref 3.8–10.5)

## 2022-05-20 PROCEDURE — 84295 ASSAY OF SERUM SODIUM: CPT

## 2022-05-20 PROCEDURE — 88305 TISSUE EXAM BY PATHOLOGIST: CPT

## 2022-05-20 PROCEDURE — 84132 ASSAY OF SERUM POTASSIUM: CPT

## 2022-05-20 PROCEDURE — 73630 X-RAY EXAM OF FOOT: CPT

## 2022-05-20 PROCEDURE — 93005 ELECTROCARDIOGRAM TRACING: CPT

## 2022-05-20 PROCEDURE — 88311 DECALCIFY TISSUE: CPT

## 2022-05-20 PROCEDURE — 85018 HEMOGLOBIN: CPT

## 2022-05-20 PROCEDURE — 85610 PROTHROMBIN TIME: CPT

## 2022-05-20 PROCEDURE — 93923 UPR/LXTR ART STDY 3+ LVLS: CPT

## 2022-05-20 PROCEDURE — 82947 ASSAY GLUCOSE BLOOD QUANT: CPT

## 2022-05-20 PROCEDURE — 82330 ASSAY OF CALCIUM: CPT

## 2022-05-20 PROCEDURE — 87077 CULTURE AEROBIC IDENTIFY: CPT

## 2022-05-20 PROCEDURE — U0003: CPT

## 2022-05-20 PROCEDURE — 83036 HEMOGLOBIN GLYCOSYLATED A1C: CPT

## 2022-05-20 PROCEDURE — 87641 MR-STAPH DNA AMP PROBE: CPT

## 2022-05-20 PROCEDURE — 86900 BLOOD TYPING SEROLOGIC ABO: CPT

## 2022-05-20 PROCEDURE — 88304 TISSUE EXAM BY PATHOLOGIST: CPT

## 2022-05-20 PROCEDURE — 80048 BASIC METABOLIC PNL TOTAL CA: CPT

## 2022-05-20 PROCEDURE — 82962 GLUCOSE BLOOD TEST: CPT

## 2022-05-20 PROCEDURE — 87640 STAPH A DNA AMP PROBE: CPT

## 2022-05-20 PROCEDURE — 80053 COMPREHEN METABOLIC PANEL: CPT

## 2022-05-20 PROCEDURE — 86850 RBC ANTIBODY SCREEN: CPT

## 2022-05-20 PROCEDURE — 96374 THER/PROPH/DIAG INJ IV PUSH: CPT

## 2022-05-20 PROCEDURE — 85025 COMPLETE CBC W/AUTO DIFF WBC: CPT

## 2022-05-20 PROCEDURE — 86901 BLOOD TYPING SEROLOGIC RH(D): CPT

## 2022-05-20 PROCEDURE — 86140 C-REACTIVE PROTEIN: CPT

## 2022-05-20 PROCEDURE — 87040 BLOOD CULTURE FOR BACTERIA: CPT

## 2022-05-20 PROCEDURE — 85014 HEMATOCRIT: CPT

## 2022-05-20 PROCEDURE — 87070 CULTURE OTHR SPECIMN AEROBIC: CPT

## 2022-05-20 PROCEDURE — U0005: CPT

## 2022-05-20 PROCEDURE — 85027 COMPLETE CBC AUTOMATED: CPT

## 2022-05-20 PROCEDURE — 86803 HEPATITIS C AB TEST: CPT

## 2022-05-20 PROCEDURE — 97162 PT EVAL MOD COMPLEX 30 MIN: CPT

## 2022-05-20 PROCEDURE — 82803 BLOOD GASES ANY COMBINATION: CPT

## 2022-05-20 PROCEDURE — 99285 EMERGENCY DEPT VISIT HI MDM: CPT | Mod: 25

## 2022-05-20 PROCEDURE — 82435 ASSAY OF BLOOD CHLORIDE: CPT

## 2022-05-20 PROCEDURE — 87075 CULTR BACTERIA EXCEPT BLOOD: CPT

## 2022-05-20 PROCEDURE — 83605 ASSAY OF LACTIC ACID: CPT

## 2022-05-20 PROCEDURE — 85652 RBC SED RATE AUTOMATED: CPT

## 2022-05-20 RX ADMIN — Medication 81 MILLIGRAM(S): at 11:49

## 2022-05-20 RX ADMIN — HEPARIN SODIUM 5000 UNIT(S): 5000 INJECTION INTRAVENOUS; SUBCUTANEOUS at 13:55

## 2022-05-20 RX ADMIN — CLOPIDOGREL BISULFATE 75 MILLIGRAM(S): 75 TABLET, FILM COATED ORAL at 11:49

## 2022-05-20 RX ADMIN — Medication 1: at 09:31

## 2022-05-20 RX ADMIN — CILOSTAZOL 50 MILLIGRAM(S): 100 TABLET ORAL at 05:15

## 2022-05-20 RX ADMIN — Medication 25 MILLIGRAM(S): at 09:31

## 2022-05-20 RX ADMIN — Medication 2: at 13:55

## 2022-05-20 RX ADMIN — HEPARIN SODIUM 5000 UNIT(S): 5000 INJECTION INTRAVENOUS; SUBCUTANEOUS at 05:15

## 2022-05-20 RX ADMIN — RANOLAZINE 500 MILLIGRAM(S): 500 TABLET, FILM COATED, EXTENDED RELEASE ORAL at 05:14

## 2022-05-20 NOTE — DISCHARGE NOTE NURSING/CASE MANAGEMENT/SOCIAL WORK - PATIENT PORTAL LINK FT
You can access the FollowMyHealth Patient Portal offered by [facility] by registering at the following website: [email or web address]. By joining [facility]’s FollowMyHealth portal, you will also be able to view your health information using other applications (apps) compatible with our system.

## 2022-05-20 NOTE — PROGRESS NOTE ADULT - SUBJECTIVE AND OBJECTIVE BOX
C A R D I O L O G Y  **********************************     DATE OF SERVICE: 05-19-22    Patient denies chest pain or shortness of breath.   Review of systems otherwise negative.  	  MEDICATIONS:  MEDICATIONS  (STANDING):  aspirin enteric coated 81 milliGRAM(s) Oral daily  atorvastatin 40 milliGRAM(s) Oral at bedtime  chlorhexidine 2% Cloths 1 Application(s) Topical daily  cilostazol 50 milliGRAM(s) Oral two times a day  clopidogrel Tablet 75 milliGRAM(s) Oral daily  dextrose 5%. 1000 milliLiter(s) (50 mL/Hr) IV Continuous <Continuous>  dextrose 5%. 1000 milliLiter(s) (100 mL/Hr) IV Continuous <Continuous>  dextrose 50% Injectable 25 Gram(s) IV Push once  dextrose 50% Injectable 12.5 Gram(s) IV Push once  dextrose 50% Injectable 25 Gram(s) IV Push once  glucagon  Injectable 1 milliGRAM(s) IntraMuscular once  heparin   Injectable 5000 Unit(s) SubCutaneous every 8 hours  insulin lispro (ADMELOG) corrective regimen sliding scale   SubCutaneous every 6 hours  metoprolol succinate ER 25 milliGRAM(s) Oral daily  ranolazine 500 milliGRAM(s) Oral two times a day      LABS:	 	    CARDIAC MARKERS:                                13.9   3.67  )-----------( 225      ( 19 May 2022 07:22 )             41.4     Hemoglobin: 13.9 g/dL (05-19 @ 07:22)  Hemoglobin: 13.0 g/dL (05-17 @ 15:19)      05-19    136  |  101  |  20  ----------------------------<  184<H>  3.9   |  23  |  0.75    Ca    9.4      19 May 2022 07:23    TPro  7.4  /  Alb  4.4  /  TBili  0.5  /  DBili  x   /  AST  21  /  ALT  19  /  AlkPhos  90  05-17    Creatinine Trend: 0.75<--, 0.92<--    COAGS:   PT/INR - ( 19 May 2022 07:22 )   PT: 12.3 sec;   INR: 1.06 ratio             proBNP:   Lipid Profile:   HgA1c:   TSH:       PHYSICAL EXAM:  T(C): 36.7 (05-19-22 @ 09:30), Max: 36.8 (05-19-22 @ 01:17)  HR: 74 (05-19-22 @ 09:30) (73 - 89)  BP: 119/66 (05-19-22 @ 09:30) (105/62 - 119/66)  RR: 18 (05-19-22 @ 09:30) (18 - 18)  SpO2: 99% (05-19-22 @ 09:30) (96% - 99%)  Wt(kg): --  I&O's Summary    18 May 2022 07:01  -  19 May 2022 07:00  --------------------------------------------------------  IN: 0 mL / OUT: 700 mL / NET: -700 mL    19 May 2022 07:01  -  19 May 2022 13:59  --------------------------------------------------------  IN: 0 mL / OUT: 600 mL / NET: -600 mL          Gen: Appears well in NAD  HEENT:  (-)icterus (-)pallor  CV: N S1 S2 1/6 SHERRY (+)2 Pulses B/l  Resp:  Clear to auscultation B/L, normal effort  GI: (+) BS Soft, NT, ND  Lymph:  (-)Edema, (-)obvious lymphadenopathy  Skin: Warm to touch, Normal turgor  Psych: Appropriate mood and affect      TELEMETRY: None	      ASSESSMENT/PLAN: Patient is a 73 y/o Male well known to our office with PMH of CAD s/p stent, Type 2 DM, HTN, and HLD who is admitted with R foot wound. Cardiology consulted for preop clearance.    - No evidence of clinical HF or anginal symptoms  - EKG with no acute ischemic changes  - Patient with recent cardiac w/u in our office including Exercise NST with no ischemia in 3/2022 and TTE with normal LV function in 1/2022  - Podiatry/Vascular follow up - FABBY/PVR noted, plan for R foot partial 3rd ray resection today  - Okay to temporarily hold antiplatelets for OR if necessary from CV perspective  - Continue perioperative beta blockers  - Based on patient's RCRI score, would consider him moderate cardiac risk for planned procedures. However, patient is optimized from CV perspective to proceed.  - No repeat cardiac testing needed at this time prior to OR  - Patient to f/u in our office with Dr. Mckeon after d/c    Moris Villalta PA-C  Pager: [phone]      
Patient is a 72y old  Male who presents with a chief complaint of Rt foot infection x 3-4 days (19 May 2022 16:57)       INTERVAL HPI/OVERNIGHT EVENTS:  Patient seen and evaluated at bedside.  Pt is resting comfortable in NAD. Denies N/V/F/C.  Pain rated at X/10    Allergies    No Known Allergies    Intolerances        Vital Signs Last 24 Hrs  T(C): 36.9 (20 May 2022 04:49), Max: 36.9 (20 May 2022 04:49)  T(F): 98.4 (20 May 2022 04:49), Max: 98.4 (20 May 2022 04:49)  HR: 79 (20 May 2022 04:49) (68 - 88)  BP: 103/60 (20 May 2022 04:49) (99/60 - 144/79)  BP(mean): 81 (19 May 2022 16:45) (75 - 85)  RR: 18 (20 May 2022 04:49) (16 - 18)  SpO2: 96% (20 May 2022 04:49) (93% - 100%)    LABS:                        13.2   5.30  )-----------( 217      ( 20 May 2022 07:26 )             39.3     05-20    137  |  103  |  20  ----------------------------<  171<H>  4.1   |  24  |  1.00    Ca    9.5      20 May 2022 07:20      PT/INR - ( 19 May 2022 07:22 )   PT: 12.3 sec;   INR: 1.06 ratio             CAPILLARY BLOOD GLUCOSE      POCT Blood Glucose.: 201 mg/dL (19 May 2022 22:01)  POCT Blood Glucose.: 108 mg/dL (19 May 2022 17:38)  POCT Blood Glucose.: 132 mg/dL (19 May 2022 12:12)      Lower Extremity Physical Exam:  Vascular: DP 0/4 B/L, /PT 1/4 B/L, CFT <3 seconds B/L, Temperature gradient warm to cool B/L  Neuro: Epicritic sensation reduced to the level of forefoot B/L  Musculoskeletal/Ortho: right foot partial 3rd digit amputation, L foot partial 2nd ray resection healed  Skin: s/p R foot partial third ray resection: skin flaps warm and edges well coapted, sutures intact, mild sanguinous ooze no active bleed, no signs of hematoma    RADIOLOGY & ADDITIONAL TESTS:  
C A R D I O L O G Y  **********************************     DATE OF SERVICE: 05-20-22    Denies chest pain or shortness of breath.   Review of systems otherwise negative.  	    MEDICATIONS  (STANDING):  aspirin enteric coated 81 milliGRAM(s) Oral daily  atorvastatin 40 milliGRAM(s) Oral at bedtime  chlorhexidine 2% Cloths 1 Application(s) Topical daily  cilostazol 50 milliGRAM(s) Oral two times a day  clopidogrel Tablet 75 milliGRAM(s) Oral daily  dextrose 5%. 1000 milliLiter(s) (50 mL/Hr) IV Continuous <Continuous>  dextrose 5%. 1000 milliLiter(s) (100 mL/Hr) IV Continuous <Continuous>  dextrose 50% Injectable 25 Gram(s) IV Push once  dextrose 50% Injectable 12.5 Gram(s) IV Push once  dextrose 50% Injectable 25 Gram(s) IV Push once  glucagon  Injectable 1 milliGRAM(s) IntraMuscular once  heparin   Injectable 5000 Unit(s) SubCutaneous every 8 hours  insulin lispro (ADMELOG) corrective regimen sliding scale   SubCutaneous three times a day before meals  insulin lispro (ADMELOG) corrective regimen sliding scale   SubCutaneous at bedtime  metoprolol succinate ER 25 milliGRAM(s) Oral daily  ranolazine 500 milliGRAM(s) Oral two times a day    MEDICATIONS  (PRN):  dextrose Oral Gel 15 Gram(s) Oral once PRN Blood Glucose LESS THAN 70 milliGRAM(s)/deciliter      LABS:                          13.2   5.30  )-----------( 217      ( 20 May 2022 07:26 )             39.3     Hemoglobin: 13.2 g/dL (05-20 @ 07:26)  Hemoglobin: 13.9 g/dL (05-19 @ 07:22)  Hemoglobin: 13.0 g/dL (05-17 @ 15:19)    05-20    137  |  103  |  20  ----------------------------<  171<H>  4.1   |  24  |  1.00    Ca    9.5      20 May 2022 07:20      Creatinine Trend: 1.00<--, 0.75<--, 0.92<--             05-19-22 @ 07:01  -  05-20-22 @ 07:00  --------------------------------------------------------  IN: 360 mL / OUT: 1000 mL / NET: -640 mL    05-20-22 @ 07:01  -  05-20-22 @ 10:51  --------------------------------------------------------  IN: 300 mL / OUT: 275 mL / NET: 25 mL        PHYSICAL EXAM  Vital Signs Last 24 Hrs  T(C): 36.4 (20 May 2022 09:21), Max: 36.9 (20 May 2022 04:49)  T(F): 97.5 (20 May 2022 09:21), Max: 98.4 (20 May 2022 04:49)  HR: 81 (20 May 2022 09:21) (68 - 88)  BP: 104/59 (20 May 2022 09:21) (99/60 - 144/79)  BP(mean): 81 (19 May 2022 16:45) (75 - 85)  RR: 18 (20 May 2022 09:21) (16 - 18)  SpO2: 97% (20 May 2022 09:21) (93% - 100%)        Gen: Appears well in NAD  HEENT:  (-)icterus (-)pallor  CV: N S1 S2 1/6 SHERRY (+)2 Pulses B/l  Resp:  Clear to auscultation B/L, normal effort  GI: (+) BS Soft, NT, ND  Lymph:  (-)Edema, (-)obvious lymphadenopathy  Skin: Warm to touch, Normal turgor  Psych: Appropriate mood and affect      TELEMETRY: None	      ASSESSMENT/PLAN: Patient is a 71 y/o Male well known to our office with PMH of CAD s/p stent, Type 2 DM, HTN, and HLD who is admitted with R foot wound. Cardiology consulted for preop clearance.    - Tolerated procedure well from CV perspective  - No evidence of clinical HF or anginal symptoms  - EKG with no acute ischemic changes  - Patient with recent cardiac w/u in our office including Exercise NST with no ischemia in 3/2022 and TTE with normal LV function in 1/2022  - Podiatry/Vascular follow up - FABBY/PVR noted, s/p R foot partial 3rd ray resection  - No repeat cardiac testing needed at this time  - Patient to f/u in our office with Dr. Mckeon after d/c    Moris Villalta PA-C  Pager: [phone]      
Patient is a 72y old  Male who presents with a chief complaint of Rt foot infection x 3-4 days (18 May 2022 16:25)      SUBJECTIVE / OVERNIGHT EVENTS: no events over night     MEDICATIONS  (STANDING):  aspirin enteric coated 81 milliGRAM(s) Oral daily  atorvastatin 40 milliGRAM(s) Oral at bedtime  chlorhexidine 2% Cloths 1 Application(s) Topical daily  cilostazol 50 milliGRAM(s) Oral two times a day  clopidogrel Tablet 75 milliGRAM(s) Oral daily  dextrose 5%. 1000 milliLiter(s) (50 mL/Hr) IV Continuous <Continuous>  dextrose 5%. 1000 milliLiter(s) (100 mL/Hr) IV Continuous <Continuous>  dextrose 50% Injectable 25 Gram(s) IV Push once  dextrose 50% Injectable 12.5 Gram(s) IV Push once  dextrose 50% Injectable 25 Gram(s) IV Push once  glucagon  Injectable 1 milliGRAM(s) IntraMuscular once  heparin   Injectable 5000 Unit(s) SubCutaneous every 8 hours  insulin lispro (ADMELOG) corrective regimen sliding scale   SubCutaneous three times a day before meals  insulin lispro (ADMELOG) corrective regimen sliding scale   SubCutaneous at bedtime  metoprolol succinate ER 25 milliGRAM(s) Oral daily  ranolazine 500 milliGRAM(s) Oral two times a day    MEDICATIONS  (PRN):  dextrose Oral Gel 15 Gram(s) Oral once PRN Blood Glucose LESS THAN 70 milliGRAM(s)/deciliter      CAPILLARY BLOOD GLUCOSE      POCT Blood Glucose.: 174 mg/dL (18 May 2022 21:35)  POCT Blood Glucose.: 136 mg/dL (18 May 2022 17:32)  POCT Blood Glucose.: 155 mg/dL (18 May 2022 14:00)  POCT Blood Glucose.: 133 mg/dL (18 May 2022 09:33)  POCT Blood Glucose.: 141 mg/dL (17 May 2022 22:31)    I&O's Summary    18 May 2022 07:01  -  18 May 2022 21:40  --------------------------------------------------------  IN: 0 mL / OUT: 200 mL / NET: -200 mL      T(C): 36.7 (05-18-22 @ 20:20), Max: 36.7 (05-18-22 @ 11:57)  HR: 74 (05-18-22 @ 20:20) (74 - 87)  BP: 110/55 (05-18-22 @ 20:20) (110/55 - 124/74)  RR: 18 (05-18-22 @ 20:20) (18 - 18)  SpO2: 96% (05-18-22 @ 20:20) (96% - 99%)    PHYSICAL EXAM:  GENERAL: NAD, well-developed  HEAD:  Atraumatic, Normocephalic  EYES: EOMI, PERRLA, conjunctiva and sclera clear  NECK: Supple, No JVD  CHEST/LUNG: Clear to auscultation bilaterally; No wheeze  HEART: Regular rate and rhythm; No murmurs, rubs, or gallops  ABDOMEN: Soft, Nontender, Nondistended; Bowel sounds present  EXTREMITIES:  2+ Peripheral Pulses, No clubbing, cyanosis, or edema  PSYCH: AAOx3  NEUROLOGY: non-focal  SKIN: No rashes or lesions                          13.0   4.88  )-----------( 232      ( 17 May 2022 15:19 )             39.1           LIVER FUNCTIONS - ( 17 May 2022 15:20 )  Alb: 4.4 g/dL / Pro: 7.4 g/dL / ALK PHOS: 90 U/L / ALT: 19 U/L / AST: 21 U/L / GGT: x             RADIOLOGY & ADDITIONAL TESTS:    Imaging Personally Reviewed:    Consultant(s) Notes Reviewed:      Care Discussed with Consultants/Other Providers:  
Patient is a 72y old  Male who presents with a chief complaint of Rt foot infection x 3-4 days (18 May 2022 16:25)      SUBJECTIVE / OVERNIGHT EVENTS: no events over night     T(C): 36.5 (05-19-22 @ 15:35), Max: 36.8 (05-19-22 @ 05:17)  HR: 71 (05-19-22 @ 16:00) (70 - 89)  BP: 114/67 (05-19-22 @ 16:00) (99/60 - 144/79)  RR: 16 (05-19-22 @ 16:00) (16 - 18)  SpO2: 99% (05-19-22 @ 16:00) (98% - 100%)      MEDICATIONS  (STANDING):  aspirin enteric coated 81 milliGRAM(s) Oral daily  atorvastatin 40 milliGRAM(s) Oral at bedtime  chlorhexidine 2% Cloths 1 Application(s) Topical daily  cilostazol 50 milliGRAM(s) Oral two times a day  clopidogrel Tablet 75 milliGRAM(s) Oral daily  dextrose 5%. 1000 milliLiter(s) (50 mL/Hr) IV Continuous <Continuous>  dextrose 5%. 1000 milliLiter(s) (100 mL/Hr) IV Continuous <Continuous>  dextrose 50% Injectable 25 Gram(s) IV Push once  dextrose 50% Injectable 12.5 Gram(s) IV Push once  dextrose 50% Injectable 25 Gram(s) IV Push once  glucagon  Injectable 1 milliGRAM(s) IntraMuscular once  heparin   Injectable 5000 Unit(s) SubCutaneous every 8 hours  insulin lispro (ADMELOG) corrective regimen sliding scale   SubCutaneous every 6 hours  lactated ringers. 1000 milliLiter(s) (75 mL/Hr) IV Continuous <Continuous>  metoprolol succinate ER 25 milliGRAM(s) Oral daily  ranolazine 500 milliGRAM(s) Oral two times a day    MEDICATIONS  (PRN):  dextrose Oral Gel 15 Gram(s) Oral once PRN Blood Glucose LESS THAN 70 milliGRAM(s)/deciliter  HYDROmorphone  Injectable 0.25 milliGRAM(s) IV Push every 10 minutes PRN Moderate Pain (4 - 6)    PHYSICAL EXAM:  GENERAL: NAD, well-developed  HEAD:  Atraumatic, Normocephalic  EYES: EOMI, PERRLA, conjunctiva and sclera clear  NECK: Supple, No JVD  CHEST/LUNG: Clear to auscultation bilaterally; No wheeze  HEART: Regular rate and rhythm; No murmurs, rubs, or gallops  ABDOMEN: Soft, Nontender, Nondistended; Bowel sounds present  EXTREMITIES:  2+ Peripheral Pulses, No clubbing, cyanosis, or edema  PSYCH: AAOx3  NEUROLOGY: non-focal  SKIN: No rashes or lesions                                            13.9   3.67  )-----------( 225      ( 19 May 2022 07:22 )             41.4             PT/INR - ( 19 May 2022 07:22 )   PT: 12.3 sec;   INR: 1.06 ratio           136|101|20<184  3.9|23|0.75  9.4,--,--  05-19 @ 07:23    CAPILLARY BLOOD GLUCOSE      POCT Blood Glucose.: 132 mg/dL (19 May 2022 12:12)  POCT Blood Glucose.: 176 mg/dL (19 May 2022 06:08)  POCT Blood Glucose.: 163 mg/dL (18 May 2022 23:56)  POCT Blood Glucose.: 174 mg/dL (18 May 2022 21:35)  POCT Blood Glucose.: 136 mg/dL (18 May 2022 17:32)    RADIOLOGY & ADDITIONAL TESTS:    Imaging Personally Reviewed:    Consultant(s) Notes Reviewed:      Care Discussed with Consultants/Other Providers:  
Patient is a 72y old  Male who presents with a chief complaint of Rt foot infection x 3-4 days (18 May 2022 16:39)      INTERVAL HPI/OVERNIGHT EVENTS:   Pt is scheduled for right foot partial 3rd ray resection with Dr. Crawford at 3:00 PM. Patient is aware of procedure and is NPO since midnight.    MEDICATIONS  (STANDING):  aspirin enteric coated 81 milliGRAM(s) Oral daily  atorvastatin 40 milliGRAM(s) Oral at bedtime  chlorhexidine 2% Cloths 1 Application(s) Topical daily  cilostazol 50 milliGRAM(s) Oral two times a day  clopidogrel Tablet 75 milliGRAM(s) Oral daily  dextrose 5%. 1000 milliLiter(s) (50 mL/Hr) IV Continuous <Continuous>  dextrose 5%. 1000 milliLiter(s) (100 mL/Hr) IV Continuous <Continuous>  dextrose 50% Injectable 25 Gram(s) IV Push once  dextrose 50% Injectable 12.5 Gram(s) IV Push once  dextrose 50% Injectable 25 Gram(s) IV Push once  glucagon  Injectable 1 milliGRAM(s) IntraMuscular once  heparin   Injectable 5000 Unit(s) SubCutaneous every 8 hours  insulin lispro (ADMELOG) corrective regimen sliding scale   SubCutaneous every 6 hours  metoprolol succinate ER 25 milliGRAM(s) Oral daily  ranolazine 500 milliGRAM(s) Oral two times a day    MEDICATIONS  (PRN):  dextrose Oral Gel 15 Gram(s) Oral once PRN Blood Glucose LESS THAN 70 milliGRAM(s)/deciliter      Allergies    No Known Allergies    Intolerances        Vital Signs Last 24 Hrs  T(C): 36.8 (19 May 2022 05:17), Max: 36.8 (19 May 2022 01:17)  T(F): 98.2 (19 May 2022 05:17), Max: 98.2 (19 May 2022 01:17)  HR: 89 (19 May 2022 05:17) (73 - 89)  BP: 105/62 (19 May 2022 05:17) (105/62 - 124/74)  BP(mean): --  RR: 18 (19 May 2022 05:17) (18 - 18)  SpO2: 99% (19 May 2022 05:17) (96% - 99%)    LABS:                        13.9   3.67  )-----------( 225      ( 19 May 2022 07:22 )             41.4     05-19    136  |  101  |  20  ----------------------------<  184<H>  3.9   |  23  |  0.75    Ca    9.4      19 May 2022 07:23    TPro  7.4  /  Alb  4.4  /  TBili  0.5  /  DBili  x   /  AST  21  /  ALT  19  /  AlkPhos  90  05-17    PT/INR - ( 19 May 2022 07:22 )   PT: 12.3 sec;   INR: 1.06 ratio             CAPILLARY BLOOD GLUCOSE      POCT Blood Glucose.: 176 mg/dL (19 May 2022 06:08)  POCT Blood Glucose.: 163 mg/dL (18 May 2022 23:56)  POCT Blood Glucose.: 174 mg/dL (18 May 2022 21:35)  POCT Blood Glucose.: 136 mg/dL (18 May 2022 17:32)  POCT Blood Glucose.: 155 mg/dL (18 May 2022 14:00)  POCT Blood Glucose.: 133 mg/dL (18 May 2022 09:33)      RADIOLOGY & ADDITIONAL TESTS:  
Podiatry pager #: [phone] ([place])/ [phone] ([facility])    Patient is a 72y old  Male who presents with a chief complaint of Rt foot infection x 3-4 days (17 May 2022 21:05)       INTERVAL HPI/OVERNIGHT EVENTS:  Patient seen and evaluated at bedside.  Pt is resting comfortable in NAD. Denies N/V/F/C.     Allergies    No Known Allergies    Intolerances        Vital Signs Last 24 Hrs  T(C): 36.5 (18 May 2022 04:42), Max: 36.7 (17 May 2022 21:05)  T(F): 97.7 (18 May 2022 04:42), Max: 98 (17 May 2022 21:05)  HR: 75 (18 May 2022 04:42) (73 - 97)  BP: 116/69 (18 May 2022 04:42) (114/73 - 139/72)  BP(mean): 97 (17 May 2022 21:05) (97 - 97)  RR: 16 (18 May 2022 04:42) (16 - 19)  SpO2: 98% (18 May 2022 04:42) (95% - 98%)    LABS:                        13.0   4.88  )-----------( 232      ( 17 May 2022 15:19 )             39.1     05-17    143  |  104  |  18  ----------------------------<  76  4.5   |  26  |  0.92    Ca    10.0      17 May 2022 15:20    TPro  7.4  /  Alb  4.4  /  TBili  0.5  /  DBili  x   /  AST  21  /  ALT  19  /  AlkPhos  90  05-17        CAPILLARY BLOOD GLUCOSE      POCT Blood Glucose.: 133 mg/dL (18 May 2022 09:33)  POCT Blood Glucose.: 141 mg/dL (17 May 2022 22:31)      Lower Extremity Physical Exam:  Vascular: DP 0/4 B/L, /PT 1/4 B/L, CFT <3 seconds B/L, Temperature gradient warm to cool B/L  Neuro: Epicritic sensation reduced to the level of forefoot B/L  Musculoskeletal/Ortho: right foot partial 3rd digit amputation, L foot partial 2nd ray resection healed  Skin: right foot partial 3rd digit amputation with lateral wound with exposed bone, 3rd digit dactylitis resolving, no drainage, no malodor, etiology 2/2 diabetic neuropathy    RADIOLOGY & ADDITIONAL TESTS:

## 2022-05-20 NOTE — DISCHARGE NOTE NURSING/CASE MANAGEMENT/SOCIAL WORK - NSDCPEFALRISK_GEN_ALL_CORE
For information on Fall & Injury Prevention, visit: [email or web address] OR  [email or web address] OR  https://www.cdc.gov/steadi/patient.html

## 2022-05-20 NOTE — PROGRESS NOTE ADULT - ASSESSMENT
Plan:   Pt is scheduled for right foot partial 3rd ray resection with Dr. Crawford at 3:00 PM. Patient is aware of procedure and is NPO since midnight.  CXR on sunrise.  EKG on sunrise.  Medical/Cardiac clearance since 5/18/22 and documented in chart.  Consent signed and in chart.  Procedure was explained to patient in detail. All alternatives, risks and complications were discussed. All questions answered.
71 yo M with right foot wound  -pt seen and evaluated  -afebrile, no leukocytosis, ESR 4, CRP <0.3  -R foot XR wet read: 3rd proximal phalanx distal lateral bony erosion  -right foot partial 3rd digit amputation, L foot partial 2nd ray resection healed, right foot partial 3rd digit amputation with lateral wound with exposed bone, 3rd digit dactylitis resolving, no drainage, no malodor, etiology 2/2 diabetic neuropathy  -continue IV Unasyn  -FABBY/PVR: RABI 0.73, LABI 0.72  -Rec vasc consult  -booked for right foot partial 3rd ray resection on Thursday 5/19/22 pending vasc recs  -please document medical optimization for surgery under sedation with local anaesthesia  -discussed with attending
73 yo M s/p R foot partial third ray resection  -pt seen and evaluated  -afebrile, no leukocytosis  -s/p R foot partial third ray resection: skin flaps warm and edges well coapted, sutures intact, mild sanguinous ooze no active bleed, no signs of hematoma  -Per intra-operative findings, low concern for residual infection, high concern for viability  - OR pathology and culture data pending, to be followed up outpt  -PT eval today, WBAT to right foot with weight distributed to heel (post-op shoe delivered)  -Pt is stable from podiatry for d/c on PO Augmentin 875 BID x7 days  -F/u information listed in addtl info section of discharge note provider  -discussed with attending
72M DMT2, HLD, CAD s/p stent on ASA/Plavix who presents with CC of R foot infection. Patient follows with Dr. Park (podiatrist) - has had a partial digit amputation of the second digit but a few days ago noticing increased drainage from the area. Denies pain, swelling, f/c. Had the area cultured in Dr. Park's office, and started on abx (unsure which one). Told to come to the ER because bone was visible. No other complaints at his time.      # Rt foot wound R foot XR wet read: 3rd proximal phalanx distal lateral bony erosion  right foot partial 3rd digit amputation with lateral wound with exposed bone,   Podiatry consulted   Plan for OR tomorrow     Patient medically optimized for the procedure   Vascular surgery consult appreciated   Plan for right foot partial 3rd ray resection am tomorrow     # DM HISS follow up A1C 7.6     #CAD s/p stent Aspirin/ Plavix  Cards pre op evaluation appreciated    Plan for Rt foot partial 3rd ray resection am tomorrow 
72M DMT2, HLD, CAD s/p stent on ASA/Plavix who presents with CC of R foot infection. Patient follows with Dr. Park (podiatrist) - has had a partial digit amputation of the second digit but a few days ago noticing increased drainage from the area. Denies pain, swelling, f/c. Had the area cultured in Dr. Park's office, and started on abx (unsure which one). Told to come to the ER because bone was visible. No other complaints at his time.      # Rt foot wound R foot XR wet read: 3rd proximal phalanx distal lateral bony erosion  right foot partial 3rd digit amputation with lateral wound with exposed bone,   Podiatry consulted   Plan for OR tomorrow     Patient medically optimized for the procedure   Vascular surgery consult appreciated   Plan for right foot partial 3rd ray resection today     # DM HISS follow up A1C 7.6     #CAD s/p stent Aspirin/ Plavix  Cards pre op evaluation appreciated    Plan for Rt foot partial 3rd ray resection am tomorrow

## 2022-05-20 NOTE — PHYSICAL THERAPY INITIAL EVALUATION ADULT - ADDITIONAL COMMENTS
Pt lives with his wife in [place] +4 steps to enter +8 steps to bedroom. Pt has walk in shower. Pt owns a cane that he occasionally ambulates.

## 2022-05-20 NOTE — PHYSICAL THERAPY INITIAL EVALUATION ADULT - PERTINENT HX OF CURRENT PROBLEM, REHAB EVAL
Pt is a 73 y/o male admitted with R foot infection x3-4 days. PMH: DM2, HLD, CAD s/p stent. Pt c/o R foot infection, with recent partial digit amputation of the second digit. The area was cultured in Dr. Park's office and started on abx. Pt was referred to ED for progressively worsening without wound. Pt s/p partial amputation of the third ray of right foot (5/19).

## 2022-05-20 NOTE — PROGRESS NOTE ADULT - REASON FOR ADMISSION
Rt foot infection x 3-4 days

## 2022-05-22 LAB
CULTURE RESULTS: SIGNIFICANT CHANGE UP
CULTURE RESULTS: SIGNIFICANT CHANGE UP
SPECIMEN SOURCE: SIGNIFICANT CHANGE UP
SPECIMEN SOURCE: SIGNIFICANT CHANGE UP

## 2022-05-24 LAB
CULTURE RESULTS: SIGNIFICANT CHANGE UP
SPECIMEN SOURCE: SIGNIFICANT CHANGE UP

## 2022-05-31 LAB — SURGICAL PATHOLOGY STUDY: SIGNIFICANT CHANGE UP

## 2022-10-27 NOTE — H&P ADULT - NSHPSOURCEINFORD_GEN_ALL_CORE
Discharge teaching completed. All questions answered. All belongings packed up including discharge folder. IV removed. Awaiting Transportation.     Chart(s)/Patient

## 2023-01-09 NOTE — PHYSICAL THERAPY INITIAL EVALUATION ADULT - ACTIVE RANGE OF MOTION EXAMINATION, REHAB EVAL
Chief Complaint    Elbow Pain (Left elbow)      History of Present Illness:  Parul Conklin is a 30 y.o. male who presents today for evaluation of left elbow pain. Patient states that he has been having intermittent pain within the left elbow over the past 4 months. He states that he has had several incidents of minor trauma to the left elbow. 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 Patient is right-hand dominant. He denies any previous penetrating or perforating wounds to the left elbow. He has had intermittent mild swelling. Pain Assessment  Location of Pain: Elbow  Location Modifiers: Left  Severity of Pain: 5  Quality of Pain: Other (Comment)  Duration of Pain: Other (Comment)  Frequency of Pain: Other (Comment)]  Medical History:  Patient's medications, allergies, past medical, surgical, social and family histories were reviewed and updated as appropriate. Review of Systems:  Pertinent items are noted in HPI  Review of systems reviewed from Patient History Form dated on 1/9/2023 and available in the patient's chart under the Media tab. Vital Signs: There were no vitals taken for this visit. General Exam:   Constitutional: Patient is adequately groomed with no evidence of malnutrition  Mental Status: The patient is oriented to time, place and person. The patient's mood and affect are appropriate. Neurological: The patient has good coordination. There is no weakness or sensory deficit. Left elbow examination:    Inspection: Today's inspection left elbow reveals the skin to be intact with no penetrating or perforating wounds. There is minimal soft tissue edema and there is minimal erythema noted. There is a bony prominence noted over the olecranon. Palpation: There is diffuse tenderness to palpation over the olecranon bursa but there is no palpable pain within the medial or lateral epicondyle.     Range of Motion: Patient has a full range of motion of the left elbow without pain    Strength: There are no significant strength deficits noted upon testing of the left elbow    Special Tests: Distal neuro vas status grossly intact    Skin: There are no rashes, ulcerations or lesions. Capillary refills within 2 seconds    Radiology:     X-rays obtained and reviewed in office:  Views 3 views of the left elbow to include AP, lateral, radial head view were obtained today in the office and independently reviewed with the patient which shows no acute fractures or dislocations within the left elbow. Assessment : Left olecranon bursitis    Impression:  Encounter Diagnosis   Name Primary? Left elbow pain Yes       Office Procedures:  Orders Placed This Encounter   Procedures    XR ELBOW LEFT (MIN 3 VIEWS)     Standing Status:   Future     Number of Occurrences:   1     Standing Expiration Date:   1/9/2024       Treatment Plan: Today we discussed the diagnosis and treatment plan and the patient was placed onto a prednisone taper that he is to take as prescribed. The patient was instructed on obtaining a left elbow pad that he is used during the day and take off at night. He was given information on obtaining diclofenac cream that he should be using on a daily basis until his symptoms improve. If he has increased swelling and redness over the left elbow he will contact the office immediately. We did discuss the signs and symptoms of septic olecranon bursitis    Follow-up: As needed    . Lupillo Correa PA-C  Board certified by the [facility] bilateral upper extremity Active ROM was WFL (within functional limits)/L ankle to neural/bilateral  lower extremity Active ROM was WFL (within functional limits)

## 2023-03-24 ENCOUNTER — RESULT REVIEW (OUTPATIENT)
Age: 73
End: 2023-03-24

## 2023-03-24 ENCOUNTER — APPOINTMENT (OUTPATIENT)
Dept: ENDOCRINOLOGY | Facility: CLINIC | Age: 73
End: 2023-03-24
Payer: MEDICARE

## 2023-03-24 VITALS
SYSTOLIC BLOOD PRESSURE: 120 MMHG | HEIGHT: 68 IN | BODY MASS INDEX: 24.1 KG/M2 | TEMPERATURE: 98.4 F | HEART RATE: 88 BPM | WEIGHT: 159 LBS | DIASTOLIC BLOOD PRESSURE: 88 MMHG | OXYGEN SATURATION: 99 %

## 2023-03-24 DIAGNOSIS — E11.9 TYPE 2 DIABETES MELLITUS W/OUT COMPLICATIONS: ICD-10-CM

## 2023-03-24 LAB
GLUCOSE BLDC GLUCOMTR-MCNC: 120
HBA1C MFR BLD HPLC: 6.6

## 2023-03-24 PROCEDURE — 83036 HEMOGLOBIN GLYCOSYLATED A1C: CPT | Mod: QW

## 2023-03-24 PROCEDURE — 82962 GLUCOSE BLOOD TEST: CPT

## 2023-03-24 PROCEDURE — 99204 OFFICE O/P NEW MOD 45 MIN: CPT

## 2023-03-24 NOTE — HISTORY OF PRESENT ILLNESS
[FreeTextEntry1] : 72 year M, referred for management of thyroid nodule. \par \par Patient with past medical hx as below, remarkable for T2DM (managed by PCP: A1c at goal 6.6%), HLD\par \par Related thyroid hx: \par \par Patient had incidentally discovered thyroid nodule, by PCP. Was referred for thyroid sonogram and FNA at [facility]. Pathology reports not available at time of visit, and family reports that it was inadequate and he is here for evaluation. \par \par \par Prior or current medication thyroid use: No\par Known family or personal hx of thyroid disease: No\par Goiter or hx of goiter: No\par Known Hx of autoimmune disease: No\par History of Radioactive iodine therapy/ Chest or Neck radiation therapy: No\par \par Reported symptoms: \par \par Fatigue: No\par Weight loss without significant change in appetite: No  \par Heat intolerance: No\par Irritability, anxiety or agitation: No\par Weakness, tremor: No\par Palpitations: No \par Exertional dyspnea: No\par Hyper defecation: No\par Anterior neck pain: No \par Insomnia: No\par \par Dyspnea: No\par Dysphagia: No\par

## 2023-03-24 NOTE — PHYSICAL EXAM
[Alert] : alert [Well Nourished] : well nourished [No Acute Distress] : no acute distress [Well Developed] : well developed [Normal Sclera/Conjunctiva] : normal sclera/conjunctiva [EOMI] : extra ocular movement intact [No Proptosis] : no proptosis [Normal Oropharynx] : the oropharynx was normal [No LAD] : no lymphadenopathy [Thyroid Not Enlarged] : the thyroid was not enlarged [No Respiratory Distress] : no respiratory distress [No Accessory Muscle Use] : no accessory muscle use [Clear to Auscultation] : lungs were clear to auscultation bilaterally [Normal S1, S2] : normal S1 and S2 [Normal Rate] : heart rate was normal [Regular Rhythm] : with a regular rhythm [No Edema] : no peripheral edema [Pedal Pulses Normal] : the pedal pulses are present [Normal Bowel Sounds] : normal bowel sounds [Not Tender] : non-tender [Not Distended] : not distended [Soft] : abdomen soft [Normal Anterior Cervical Nodes] : no anterior cervical lymphadenopathy [Normal Posterior Cervical Nodes] : no posterior cervical lymphadenopathy [No Spinal Tenderness] : no spinal tenderness [Spine Straight] : spine straight [No Stigmata of Cushings Syndrome] : no stigmata of Cushings Syndrome [Normal Gait] : normal gait [Normal Strength/Tone] : muscle strength and tone were normal [No Rash] : no rash [Normal Reflexes] : deep tendon reflexes were 2+ and symmetric [No Tremors] : no tremors [Oriented x3] : oriented to person, place, and time [Acanthosis Nigricans] : no acanthosis nigricans

## 2023-03-24 NOTE — REASON FOR VISIT
[Initial Evaluation] : an initial evaluation [Thyroid nodule/ MNG] : thyroid nodule/ MNG [DM Type 2] : DM Type 2

## 2023-03-24 NOTE — DATA REVIEWED
[FreeTextEntry1] : [facility]  1/16/2023\par \par left 1.5cm, hypoechoic upper pole nodule\par \par FNA done: 2/2/023  of left nodule\par \par Family reports FNA result was likely insufficient/ inadequate

## 2023-03-29 ENCOUNTER — INPATIENT (INPATIENT)
Facility: HOSPITAL | Age: 73
LOS: 3 days | Discharge: HOME | DRG: 287 | End: 2023-04-02
Attending: INTERNAL MEDICINE | Admitting: INTERNAL MEDICINE
Payer: MEDICARE

## 2023-03-29 VITALS
DIASTOLIC BLOOD PRESSURE: 71 MMHG | HEART RATE: 80 BPM | SYSTOLIC BLOOD PRESSURE: 137 MMHG | TEMPERATURE: 98 F | RESPIRATION RATE: 18 BRPM | OXYGEN SATURATION: 99 % | WEIGHT: 160.06 LBS

## 2023-03-29 DIAGNOSIS — I73.9 PERIPHERAL VASCULAR DISEASE, UNSPECIFIED: ICD-10-CM

## 2023-03-29 DIAGNOSIS — I20.8 OTHER FORMS OF ANGINA PECTORIS: ICD-10-CM

## 2023-03-29 DIAGNOSIS — E11.9 TYPE 2 DIABETES MELLITUS WITHOUT COMPLICATIONS: ICD-10-CM

## 2023-03-29 DIAGNOSIS — I25.10 ATHEROSCLEROTIC HEART DISEASE OF NATIVE CORONARY ARTERY WITHOUT ANGINA PECTORIS: ICD-10-CM

## 2023-03-29 DIAGNOSIS — R07.9 CHEST PAIN, UNSPECIFIED: ICD-10-CM

## 2023-03-29 DIAGNOSIS — Z95.5 PRESENCE OF CORONARY ANGIOPLASTY IMPLANT AND GRAFT: Chronic | ICD-10-CM

## 2023-03-29 DIAGNOSIS — I35.0 NONRHEUMATIC AORTIC (VALVE) STENOSIS: ICD-10-CM

## 2023-03-29 DIAGNOSIS — Z90.49 ACQUIRED ABSENCE OF OTHER SPECIFIED PARTS OF DIGESTIVE TRACT: Chronic | ICD-10-CM

## 2023-03-29 LAB
GLUCOSE BLDC GLUCOMTR-MCNC: 120 MG/DL — HIGH (ref 70–99)
GLUCOSE BLDC GLUCOMTR-MCNC: 130 MG/DL — HIGH (ref 70–99)
TROPONIN T, HIGH SENSITIVITY RESULT: 16 NG/L — SIGNIFICANT CHANGE UP (ref 0–51)

## 2023-03-29 PROCEDURE — 99223 1ST HOSP IP/OBS HIGH 75: CPT

## 2023-03-29 PROCEDURE — 71046 X-RAY EXAM CHEST 2 VIEWS: CPT | Mod: 26

## 2023-03-29 PROCEDURE — 99285 EMERGENCY DEPT VISIT HI MDM: CPT

## 2023-03-29 RX ORDER — ACETAMINOPHEN 500 MG
650 TABLET ORAL EVERY 6 HOURS
Refills: 0 | Status: DISCONTINUED | OUTPATIENT
Start: 2023-03-29 | End: 2023-04-02

## 2023-03-29 RX ORDER — CLOPIDOGREL BISULFATE 75 MG/1
75 TABLET, FILM COATED ORAL AT BEDTIME
Refills: 0 | Status: DISCONTINUED | OUTPATIENT
Start: 2023-03-29 | End: 2023-04-02

## 2023-03-29 RX ORDER — SODIUM CHLORIDE 9 MG/ML
1000 INJECTION, SOLUTION INTRAVENOUS
Refills: 0 | Status: DISCONTINUED | OUTPATIENT
Start: 2023-03-29 | End: 2023-04-02

## 2023-03-29 RX ORDER — ATORVASTATIN CALCIUM 80 MG/1
40 TABLET, FILM COATED ORAL AT BEDTIME
Refills: 0 | Status: DISCONTINUED | OUTPATIENT
Start: 2023-03-29 | End: 2023-04-02

## 2023-03-29 RX ORDER — DEXTROSE 50 % IN WATER 50 %
25 SYRINGE (ML) INTRAVENOUS ONCE
Refills: 0 | Status: DISCONTINUED | OUTPATIENT
Start: 2023-03-29 | End: 2023-04-02

## 2023-03-29 RX ORDER — ISOSORBIDE MONONITRATE 60 MG/1
30 TABLET, EXTENDED RELEASE ORAL DAILY
Refills: 0 | Status: DISCONTINUED | OUTPATIENT
Start: 2023-03-29 | End: 2023-04-02

## 2023-03-29 RX ORDER — ASPIRIN/CALCIUM CARB/MAGNESIUM 324 MG
81 TABLET ORAL DAILY
Refills: 0 | Status: DISCONTINUED | OUTPATIENT
Start: 2023-03-29 | End: 2023-04-02

## 2023-03-29 RX ORDER — METOPROLOL TARTRATE 50 MG
25 TABLET ORAL DAILY
Refills: 0 | Status: DISCONTINUED | OUTPATIENT
Start: 2023-03-29 | End: 2023-04-02

## 2023-03-29 RX ORDER — DEXTROSE 50 % IN WATER 50 %
12.5 SYRINGE (ML) INTRAVENOUS ONCE
Refills: 0 | Status: DISCONTINUED | OUTPATIENT
Start: 2023-03-29 | End: 2023-04-02

## 2023-03-29 RX ORDER — INSULIN LISPRO 100/ML
VIAL (ML) SUBCUTANEOUS AT BEDTIME
Refills: 0 | Status: DISCONTINUED | OUTPATIENT
Start: 2023-03-29 | End: 2023-04-02

## 2023-03-29 RX ORDER — RANOLAZINE 500 MG/1
1 TABLET, FILM COATED, EXTENDED RELEASE ORAL
Qty: 0 | Refills: 0 | DISCHARGE

## 2023-03-29 RX ORDER — GLUCAGON INJECTION, SOLUTION 0.5 MG/.1ML
1 INJECTION, SOLUTION SUBCUTANEOUS ONCE
Refills: 0 | Status: DISCONTINUED | OUTPATIENT
Start: 2023-03-29 | End: 2023-04-02

## 2023-03-29 RX ORDER — GABAPENTIN 400 MG/1
100 CAPSULE ORAL AT BEDTIME
Refills: 0 | Status: DISCONTINUED | OUTPATIENT
Start: 2023-03-29 | End: 2023-04-02

## 2023-03-29 RX ORDER — DEXTROSE 50 % IN WATER 50 %
15 SYRINGE (ML) INTRAVENOUS ONCE
Refills: 0 | Status: DISCONTINUED | OUTPATIENT
Start: 2023-03-29 | End: 2023-04-02

## 2023-03-29 RX ORDER — INSULIN LISPRO 100/ML
VIAL (ML) SUBCUTANEOUS
Refills: 0 | Status: DISCONTINUED | OUTPATIENT
Start: 2023-03-29 | End: 2023-04-02

## 2023-03-29 RX ORDER — CILOSTAZOL 100 MG/1
1 TABLET ORAL
Qty: 0 | Refills: 0 | DISCHARGE

## 2023-03-29 RX ORDER — RANOLAZINE 500 MG/1
500 TABLET, FILM COATED, EXTENDED RELEASE ORAL DAILY
Refills: 0 | Status: DISCONTINUED | OUTPATIENT
Start: 2023-03-29 | End: 2023-04-02

## 2023-03-29 RX ADMIN — GABAPENTIN 100 MILLIGRAM(S): 400 CAPSULE ORAL at 22:07

## 2023-03-29 RX ADMIN — ATORVASTATIN CALCIUM 40 MILLIGRAM(S): 80 TABLET, FILM COATED ORAL at 22:07

## 2023-03-29 RX ADMIN — CLOPIDOGREL BISULFATE 75 MILLIGRAM(S): 75 TABLET, FILM COATED ORAL at 22:07

## 2023-03-29 NOTE — H&P ADULT - NSHPPHYSICALEXAM_GEN_ALL_CORE
T(C): --  HR: --  BP: --  RR: --  SpO2: --    GEN: male in NAD, appears comfortable, no diaphoresis  EYES: No scleral injection, PERRL, EOMI  ENTM: neck supple & symmetric without tracheal deviation, moist membranes, no gross hearing impairment, thyroid gland not enlarged  CV: +S1/S2, 2/6 systolic murmur best heard in aortic region, no abdominal bruit, no LE edema  RESP: breathing comfortably, no respiratory accessory muscle use, CTAB, no w/r/r  GI: normoactive BS, soft, NTND, no rebounding/guarding, no palpable masses  LYMPHATICS: no LAD or tenderness to palpation  NEURO: AOx3, no focal deficits, CNII-XII grossly intact  PSYCH: No SI/HI/AVH, appropriate affect, appropriate insight/judgment   SKIN: no petechiae, ecchymosis or maculopapular rash noted

## 2023-03-29 NOTE — H&P ADULT - HISTORY OF PRESENT ILLNESS
72M with PMHx of CAD (with several stents), moderate AS, PAD, And T2DM presenting with dyspnea on exertion and angina. Patient states he has several stents placed in [facility]. He has been following with [facility]. Last angiogram was several years prior. He states for past several months would have squeezing chest discomfort when exerting himself, but okay with mild walking. Associated with shortness of breath which improves with rest. He recently had negative stress test. Given persistence of symptoms patient referred by cardiology for C. He denies leg swelling or lightheadedness. Feels okay at rest. No leg tightness.

## 2023-03-29 NOTE — H&P ADULT - ASSESSMENT
72M with PMHx of CAD (with several stents), moderate AS, PAD, And T2DM presenting with dyspnea on exertion and possibly stable angina.

## 2023-03-29 NOTE — CONSULT NOTE ADULT - SUBJECTIVE AND OBJECTIVE BOX
CARDIOLOGY ATTENDING    History: He is a very pleasant 71 y/o male PMH CAD (see cath report from 2016 in [place]) who is now a/w progressive angina. Fortunately his EKG is unchanged (non-specific inferior Twi) and his troponin is low normal (17). His most recent NST is our office showed no ischemia, and his most recent echo showed normal LVEF with moderate AS. There is a clinical concern the NST is a false negative given his recurrent angina. He denies palpitations nor syncope.    PMH: CAD, hypertension, hyperlipidemia, diabetes, PAD, moderate AS  PSHx: PCI several years ago  Social HX: no tobacco, no alcohol, no drugs  Allergies: No Known Drug Allergies  HOME Medications:   Januvia 100 mg tablet TABLET  Plavix 75 mg tablet TABLET  Ranexa 500 mg tablet,extended release 1 TABLET BID  aspirin 81 mg tablet,delayed release TABLET,DELAYED RELEASE (DR/EC)  glipizide 10 mg tablet TABLET  isosorbide mononitrate ER 30 mg tablet,extended release 24 hr TABLET EXTENDED RELEASE 24 HR  metformin 1,000 mg tablet TABLET  metoprolol tartrate 25 mg tablet TABLET  simvastatin 80 mg tablet TABLET    Review of Systems:   Constitutional: [ ] fevers, [ ] chills.   Skin: [ ] dry skin. [ ] rashes.  Psychiatric: [ ] depression, [ ] anxiety.   Gastrointestinal: [ ] BRBPR, [ ] melena.   Neurological: [ ] confusion. [ ] seizures. [ ] shuffling gait.   Ears,Nose,Mouth and Throat: [ ] ear pain [ ] sore throat.   Eyes: [ ] diplopia.   Respiratory: [ ] hemoptysis. [ ] shortness of breath  Cardiovascular: See HPI above  Hematologic/Lymphatic: [ ] anemia. [ ] painful nodes. [ ] prolonged bleeding.   Genitourinary: [ ] hematuria. [ ] flank pain.   Endocrine: [ ] significant change in weight. [ ] intolerance to heat and cold.     Review of systems [ x] otherwise negative, [ ] otherwise unable to obtain                              12.6   4.43  )-----------( 210      ( 29 Mar 2023 13:42 )             36.6       03-29    138  |  101  |  19  ----------------------------<  209<H>  4.3   |  24  |  1.02    Ca    9.4      29 Mar 2023 13:42  Mg     2.1     03-29    TPro  7.0  /  Alb  4.3  /  TBili  0.4  /  DBili  x   /  AST  26  /  ALT  22  /  AlkPhos  61  03-29      CARDIAC MARKERS ( 29 Mar 2023 13:42 )  x     / x     / 159 U/L / x     / 6.8 ng/mL        T(C): --  HR: --  BP: --  RR: --  SpO2: --  Wt(kg): --    I&O's Summary      General: Well nourished in no acute distress. Alert and Oriented * 3.   Head: Normocephalic and atraumatic.   Neck: No JVD. No bruits. Supple. Does not appear to be enlarged.   Cardiovascular: + S1,S2 ; RRR Soft systolic murmur at the left lower sternal border. No rubs noted.    Lungs: CTA b/l. No rhonchi, rales or wheezes.   Abdomen: + BS, soft. Non tender. Non distended. No rebound. No guarding.   Extremities: No clubbing/cyanosis/edema.   Neurologic: Moves all four extremities. Full range of motion.   Skin: Warm and moist. The patient's skin has normal elasticity and good skin turgor.   Psychiatric: Appropriate mood and affect.  Musculoskeletal: Normal range of motion, normal strength    A/P) He is a very pleasant 71 y/o male PMH, hypertension, hyperlipidemia, diabetes, PAD, moderate AS and CAD (see cath report from 2016 in [place]) who is now a/w progressive angina. Fortunately his EKG is unchanged (non-specific inferior Twi) and his troponin is low normal (17). His most recent NST is our office showed no ischemia, and his most recent echo showed normal LVEF with moderate AS. There is a clinical concern the NST is a false negative given his recurrent angina. He denies palpitations nor syncope.    -admit to telemetry  -medicine consult Dr Noble  -continue plavix and asa for CAD and PAD  -continue zocor for hyperlipidemia  -continue metorpolol, ranexa and imdur for refractory angina  -get echo r/o severe AS  -cath tomorrow  -f/u with [facility] after discharge      Adalgisa Gutierrez M.D., [facility]  Cardiac Electrophysiology  [facility]  [address] [place] [address], [address]  [place] [id number]  [email or web address]    office [phone]  pager [phone]

## 2023-03-29 NOTE — ED PROVIDER NOTE - CLINICAL SUMMARY MEDICAL DECISION MAKING FREE TEXT BOX
Jh: 72 year old male with cp, sob on exertion. no cp at this time. exam unremarkable. will get labs, cxr, ekg, cardiac enzymes, admit.

## 2023-03-29 NOTE — ED PROVIDER NOTE - NS ED ATTENDING STATEMENT MOD
This was a shared visit with the GITA. I reviewed and verified the documentation and independently performed the documented:

## 2023-03-29 NOTE — H&P ADULT - PROBLEM SELECTOR PLAN 1
Possibly cardiac equivalent or just deconditioning  For C  Monitor on telemetry  Optimize CAD as below

## 2023-03-29 NOTE — H&P ADULT - NSHPREVIEWOFSYSTEMS_GEN_ALL_CORE
GEN: no night sweats or change in appetite  EYES: no changes in vision or diplopia   ENT: no epistaxis, sinus pain, gingival bleeding, odynophagia or dysphagia  CV: no PND or palpitations; +chest discomfort with exertion  RESP: no cough, wheezing, or hemoptysis; +PULIDO  GI: no hematemesis, hematochezia, or melena  : no dysuria, polyuria, or hematuria  MSK: no arthralgias or joint swelling   NEURO: no gross sensory changes, numbness, focal deficits  PSYCH: no depression or changes in concentration  HEME/ONC: no purpura, petechiae or night sweats  SKIN: no pruritus, hair loss or skin lesions  ALL: no photosensitivity, no complaints of anaphylaxis (SOB, throat swelling)

## 2023-03-29 NOTE — PATIENT PROFILE ADULT - FALL HARM RISK - UNIVERSAL INTERVENTIONS
not applicable
Bed in lowest position, wheels locked, appropriate side rails in place/Call bell, personal items and telephone in reach/Instruct patient to call for assistance before getting out of bed or chair/Non-slip footwear when patient is out of bed/[facility] to call system/Physically safe environment - no spills, clutter or unnecessary equipment/Purposeful Proactive Rounding/Room/bathroom lighting operational, light cord in reach

## 2023-03-30 LAB
A1C WITH ESTIMATED AVERAGE GLUCOSE RESULT: 6.4 % — HIGH (ref 4–5.6)
ANION GAP SERPL CALC-SCNC: 13 MMOL/L — SIGNIFICANT CHANGE UP (ref 5–17)
BUN SERPL-MCNC: 19 MG/DL — SIGNIFICANT CHANGE UP (ref 7–23)
CALCIUM SERPL-MCNC: 9.4 MG/DL — SIGNIFICANT CHANGE UP (ref 8.4–10.5)
CHLORIDE SERPL-SCNC: 103 MMOL/L — SIGNIFICANT CHANGE UP (ref 96–108)
CO2 SERPL-SCNC: 23 MMOL/L — SIGNIFICANT CHANGE UP (ref 22–31)
CREAT SERPL-MCNC: 0.98 MG/DL — SIGNIFICANT CHANGE UP (ref 0.5–1.3)
EGFR: 82 ML/MIN/1.73M2 — SIGNIFICANT CHANGE UP
ESTIMATED AVERAGE GLUCOSE: 137 MG/DL — HIGH (ref 68–114)
GLUCOSE BLDC GLUCOMTR-MCNC: 144 MG/DL — HIGH (ref 70–99)
GLUCOSE BLDC GLUCOMTR-MCNC: 153 MG/DL — HIGH (ref 70–99)
GLUCOSE BLDC GLUCOMTR-MCNC: 164 MG/DL — HIGH (ref 70–99)
GLUCOSE BLDC GLUCOMTR-MCNC: 175 MG/DL — HIGH (ref 70–99)
GLUCOSE SERPL-MCNC: 155 MG/DL — HIGH (ref 70–99)
POTASSIUM SERPL-MCNC: 4.2 MMOL/L — SIGNIFICANT CHANGE UP (ref 3.5–5.3)
POTASSIUM SERPL-SCNC: 4.2 MMOL/L — SIGNIFICANT CHANGE UP (ref 3.5–5.3)
SODIUM SERPL-SCNC: 139 MMOL/L — SIGNIFICANT CHANGE UP (ref 135–145)

## 2023-03-30 PROCEDURE — 93306 TTE W/DOPPLER COMPLETE: CPT | Mod: 26

## 2023-03-30 RX ORDER — CHLORHEXIDINE GLUCONATE 213 G/1000ML
1 SOLUTION TOPICAL
Refills: 0 | Status: DISCONTINUED | OUTPATIENT
Start: 2023-03-30 | End: 2023-04-02

## 2023-03-30 RX ADMIN — CLOPIDOGREL BISULFATE 75 MILLIGRAM(S): 75 TABLET, FILM COATED ORAL at 22:11

## 2023-03-30 RX ADMIN — ATORVASTATIN CALCIUM 40 MILLIGRAM(S): 80 TABLET, FILM COATED ORAL at 22:11

## 2023-03-30 RX ADMIN — Medication 1: at 16:31

## 2023-03-30 RX ADMIN — Medication 81 MILLIGRAM(S): at 11:22

## 2023-03-30 RX ADMIN — Medication 1: at 12:27

## 2023-03-30 RX ADMIN — Medication 1: at 08:07

## 2023-03-30 RX ADMIN — Medication 25 MILLIGRAM(S): at 05:31

## 2023-03-30 RX ADMIN — GABAPENTIN 100 MILLIGRAM(S): 400 CAPSULE ORAL at 22:11

## 2023-03-30 RX ADMIN — ISOSORBIDE MONONITRATE 30 MILLIGRAM(S): 60 TABLET, EXTENDED RELEASE ORAL at 11:22

## 2023-03-30 RX ADMIN — RANOLAZINE 500 MILLIGRAM(S): 500 TABLET, FILM COATED, EXTENDED RELEASE ORAL at 11:22

## 2023-03-30 NOTE — PROGRESS NOTE ADULT - SUBJECTIVE AND OBJECTIVE BOX
C A R D I O L O G Y  **********************************     DATE OF SERVICE: 03-30-23    Patient has PULIDO. Chest pain improving today. denies palpitations or SOB at rest. Review of systems otherwise negative.  	  MEDICATIONS:  MEDICATIONS  (STANDING):  aspirin  chewable 81 milliGRAM(s) Oral daily  atorvastatin 40 milliGRAM(s) Oral at bedtime  chlorhexidine 2% Cloths 1 Application(s) Topical <User Schedule>  clopidogrel Tablet 75 milliGRAM(s) Oral at bedtime  dextrose 5%. 1000 milliLiter(s) (100 mL/Hr) IV Continuous <Continuous>  dextrose 5%. 1000 milliLiter(s) (50 mL/Hr) IV Continuous <Continuous>  dextrose 50% Injectable 25 Gram(s) IV Push once  dextrose 50% Injectable 12.5 Gram(s) IV Push once  dextrose 50% Injectable 25 Gram(s) IV Push once  gabapentin 100 milliGRAM(s) Oral at bedtime  glucagon  Injectable 1 milliGRAM(s) IntraMuscular once  insulin lispro (ADMELOG) corrective regimen sliding scale   SubCutaneous three times a day before meals  insulin lispro (ADMELOG) corrective regimen sliding scale   SubCutaneous at bedtime  isosorbide   mononitrate ER Tablet (IMDUR) 30 milliGRAM(s) Oral daily  metoprolol succinate ER 25 milliGRAM(s) Oral daily  ranolazine 500 milliGRAM(s) Oral daily      LABS:	 	    CARDIAC MARKERS:  CARDIAC MARKERS ( 29 Mar 2023 13:42 )  x     / x     / 159 U/L / x     / 6.8 ng/mL                                12.6   4.43  )-----------( 210      ( 29 Mar 2023 13:42 )             36.6     Hemoglobin: 12.6 g/dL (03-29 @ 13:42)      03-30    139  |  103  |  19  ----------------------------<  155<H>  4.2   |  23  |  0.98    Ca    9.4      30 Mar 2023 06:15  Mg     2.1     03-29    TPro  7.0  /  Alb  4.3  /  TBili  0.4  /  DBili  x   /  AST  26  /  ALT  22  /  AlkPhos  61  03-29    Creatinine Trend: 0.98<--, 1.02<--    COAGS:       proBNP:   Lipid Profile:   HgA1c:   TSH:       PHYSICAL EXAM:  T(C): 36.8 (03-30-23 @ 11:19), Max: 36.8 (03-30-23 @ 11:19)  HR: 71 (03-30-23 @ 11:19) (64 - 81)  BP: 117/70 (03-30-23 @ 11:19) (117/70 - 156/70)  RR: 18 (03-30-23 @ 11:19) (18 - 18)  SpO2: 98% (03-30-23 @ 11:19) (96% - 99%)  Wt(kg): --  I&O's Summary    30 Mar 2023 07:01  -  30 Mar 2023 13:55  --------------------------------------------------------  IN: 780 mL / OUT: 600 mL / NET: 180 mL      Gen: NAD  HEENT:  (-)icterus (-)pallor  CV: N S1 S2 1/6 SHERRY (+)2 Pulses B/l  Resp:  Clear to auscultation B/L, normal effort  GI: (+) BS Soft, NT, ND  Lymph:  (-)Edema, (-)obvious lymphadenopathy  Skin: Warm to touch, Normal turgor  Psych: Appropriate mood and affect      TELEMETRY: SR 60-80s	      ASSESSMENT/PLAN: He is a very pleasant 73 y/o male PMH, hypertension, hyperlipidemia, diabetes, PAD, moderate AS and CAD (see cath report from 2016 in [place]) who is now a/w progressive angina. Fortunately his EKG is unchanged (non-specific inferior Twi) and his troponin is low normal (17). His most recent NST is our office showed no ischemia, and his most recent echo showed normal LVEF with moderate AS. There is a clinical concern the NST is a false negative given his recurrent angina. He denies palpitations nor syncope.    -continue plavix and asa for CAD and PAD  -continue zocor for hyperlipidemia  -continue metorpolol, ranexa and imdur for refractory angina  -follow up echo to r/o severe AS  -cath possibly tomorrow pending echo results  -f/u with [facility] after discharge    Moris Villalta PA-C  Pager: [phone]

## 2023-03-30 NOTE — PROGRESS NOTE ADULT - SUBJECTIVE AND OBJECTIVE BOX
Patient is a 72y old  Male who presents with a chief complaint of Ischemic Work up (30 Mar 2023 13:55)      SUBJECTIVE / OVERNIGHT EVENTS:    Events noted.  CONSTITUTIONAL: No fever,  or fatigue  RESPIRATORY: No cough, wheezing,  No shortness of breath  CARDIOVASCULAR: No chest pain, palpitations, dizziness, or leg swelling  GASTROINTESTINAL: No abdominal or epigastric pain.       MEDICATIONS  (STANDING):  aspirin  chewable 81 milliGRAM(s) Oral daily  atorvastatin 40 milliGRAM(s) Oral at bedtime  chlorhexidine 2% Cloths 1 Application(s) Topical <User Schedule>  clopidogrel Tablet 75 milliGRAM(s) Oral at bedtime  dextrose 5%. 1000 milliLiter(s) (100 mL/Hr) IV Continuous <Continuous>  dextrose 5%. 1000 milliLiter(s) (50 mL/Hr) IV Continuous <Continuous>  dextrose 50% Injectable 25 Gram(s) IV Push once  dextrose 50% Injectable 12.5 Gram(s) IV Push once  dextrose 50% Injectable 25 Gram(s) IV Push once  gabapentin 100 milliGRAM(s) Oral at bedtime  glucagon  Injectable 1 milliGRAM(s) IntraMuscular once  insulin lispro (ADMELOG) corrective regimen sliding scale   SubCutaneous three times a day before meals  insulin lispro (ADMELOG) corrective regimen sliding scale   SubCutaneous at bedtime  isosorbide   mononitrate ER Tablet (IMDUR) 30 milliGRAM(s) Oral daily  metoprolol succinate ER 25 milliGRAM(s) Oral daily  ranolazine 500 milliGRAM(s) Oral daily    MEDICATIONS  (PRN):  acetaminophen     Tablet .. 650 milliGRAM(s) Oral every 6 hours PRN Temp greater or equal to 38C (100.4F), Mild Pain (1 - 3)  dextrose Oral Gel 15 Gram(s) Oral once PRN Blood Glucose LESS THAN 70 milliGRAM(s)/deciliter        CAPILLARY BLOOD GLUCOSE      POCT Blood Glucose.: 144 mg/dL (30 Mar 2023 21:46)  POCT Blood Glucose.: 153 mg/dL (30 Mar 2023 16:24)  POCT Blood Glucose.: 175 mg/dL (30 Mar 2023 12:08)  POCT Blood Glucose.: 164 mg/dL (30 Mar 2023 07:42)    I&O's Summary    30 Mar 2023 07:01  -  30 Mar 2023 23:39  --------------------------------------------------------  IN: 780 mL / OUT: 600 mL / NET: 180 mL        T(C): 36.8 (03-30-23 @ 20:49), Max: 36.8 (03-30-23 @ 11:19)  HR: 65 (03-30-23 @ 20:49) (65 - 81)  BP: 110/57 (03-30-23 @ 20:49) (110/57 - 118/69)  RR: 18 (03-30-23 @ 20:49) (18 - 18)  SpO2: 97% (03-30-23 @ 20:49) (96% - 98%)    PHYSICAL EXAM:  GENERAL: NAD  NECK: Supple, No JVD  CHEST/LUNG: Clear to auscultation bilaterally; No wheezing.  HEART: Regular rate and rhythm; No murmurs, rubs, or gallops  ABDOMEN: Soft, Nontender, Nondistended; Bowel sounds present  EXTREMITIES:   No edema  NEUROLOGY: AAO      LABS:                        12.6   4.43  )-----------( 210      ( 29 Mar 2023 13:42 )             36.6     03-30    139  |  103  |  19  ----------------------------<  155<H>  4.2   |  23  |  0.98    Ca    9.4      30 Mar 2023 06:15  Mg     2.1     03-29    TPro  7.0  /  Alb  4.3  /  TBili  0.4  /  DBili  x   /  AST  26  /  ALT  22  /  AlkPhos  61  03-29      CARDIAC MARKERS ( 29 Mar 2023 13:42 )  x     / x     / 159 U/L / x     / 6.8 ng/mL        CAPILLARY BLOOD GLUCOSE      POCT Blood Glucose.: 144 mg/dL (30 Mar 2023 21:46)  POCT Blood Glucose.: 153 mg/dL (30 Mar 2023 16:24)  POCT Blood Glucose.: 175 mg/dL (30 Mar 2023 12:08)  POCT Blood Glucose.: 164 mg/dL (30 Mar 2023 07:42)        RADIOLOGY & ADDITIONAL TESTS:    Imaging Personally Reviewed:    Consultant(s) Notes Reviewed:      Care Discussed with Consultants/Other Providers:    Arsh Saxena MD, CMD, FACP    [address] [place] [address]  Office Tel: [phone]  Cell: [phone]

## 2023-03-31 LAB
BUN SERPL-MCNC: 19 MG/DL — SIGNIFICANT CHANGE UP (ref 7–23)
CALCIUM SERPL-MCNC: 9.3 MG/DL — SIGNIFICANT CHANGE UP (ref 8.4–10.5)
CHLORIDE SERPL-SCNC: 104 MMOL/L — SIGNIFICANT CHANGE UP (ref 96–108)
CO2 SERPL-SCNC: 24 MMOL/L — SIGNIFICANT CHANGE UP (ref 22–31)
CREAT SERPL-MCNC: 1.01 MG/DL — SIGNIFICANT CHANGE UP (ref 0.5–1.3)
EGFR: 80 ML/MIN/1.73M2 — SIGNIFICANT CHANGE UP
GLUCOSE BLDC GLUCOMTR-MCNC: 135 MG/DL — HIGH (ref 70–99)
GLUCOSE BLDC GLUCOMTR-MCNC: 160 MG/DL — HIGH (ref 70–99)
GLUCOSE BLDC GLUCOMTR-MCNC: 171 MG/DL — HIGH (ref 70–99)
GLUCOSE BLDC GLUCOMTR-MCNC: 188 MG/DL — HIGH (ref 70–99)
GLUCOSE BLDC GLUCOMTR-MCNC: 203 MG/DL — HIGH (ref 70–99)
GLUCOSE SERPL-MCNC: 177 MG/DL — HIGH (ref 70–99)
HCT VFR BLD CALC: 39 % — SIGNIFICANT CHANGE UP (ref 39–50)
HGB BLD-MCNC: 13.3 G/DL — SIGNIFICANT CHANGE UP (ref 13–17)
MCHC RBC-ENTMCNC: 30.7 PG — SIGNIFICANT CHANGE UP (ref 27–34)
MCHC RBC-ENTMCNC: 34.1 GM/DL — SIGNIFICANT CHANGE UP (ref 32–36)
MCV RBC AUTO: 90.1 FL — SIGNIFICANT CHANGE UP (ref 80–100)
MRSA PCR RESULT.: SIGNIFICANT CHANGE UP
NRBC # BLD: 0 /100 WBCS — SIGNIFICANT CHANGE UP (ref 0–0)
PLATELET # BLD AUTO: 193 K/UL — SIGNIFICANT CHANGE UP (ref 150–400)
POTASSIUM SERPL-MCNC: 4.1 MMOL/L — SIGNIFICANT CHANGE UP (ref 3.5–5.3)
POTASSIUM SERPL-SCNC: 4.1 MMOL/L — SIGNIFICANT CHANGE UP (ref 3.5–5.3)
RBC # BLD: 4.33 M/UL — SIGNIFICANT CHANGE UP (ref 4.2–5.8)
RBC # FLD: 13.2 % — SIGNIFICANT CHANGE UP (ref 10.3–14.5)
S AUREUS DNA NOSE QL NAA+PROBE: SIGNIFICANT CHANGE UP
SODIUM SERPL-SCNC: 139 MMOL/L — SIGNIFICANT CHANGE UP (ref 135–145)
WBC # BLD: 3.81 K/UL — SIGNIFICANT CHANGE UP (ref 3.8–10.5)
WBC # FLD AUTO: 3.81 K/UL — SIGNIFICANT CHANGE UP (ref 3.8–10.5)

## 2023-03-31 PROCEDURE — 99152 MOD SED SAME PHYS/QHP 5/>YRS: CPT

## 2023-03-31 PROCEDURE — 93454 CORONARY ARTERY ANGIO S&I: CPT | Mod: 26

## 2023-03-31 RX ADMIN — CLOPIDOGREL BISULFATE 75 MILLIGRAM(S): 75 TABLET, FILM COATED ORAL at 22:06

## 2023-03-31 RX ADMIN — Medication 25 MILLIGRAM(S): at 04:57

## 2023-03-31 RX ADMIN — ATORVASTATIN CALCIUM 40 MILLIGRAM(S): 80 TABLET, FILM COATED ORAL at 22:06

## 2023-03-31 RX ADMIN — Medication 1: at 12:23

## 2023-03-31 RX ADMIN — GABAPENTIN 100 MILLIGRAM(S): 400 CAPSULE ORAL at 22:05

## 2023-03-31 RX ADMIN — CHLORHEXIDINE GLUCONATE 1 APPLICATION(S): 213 SOLUTION TOPICAL at 04:57

## 2023-03-31 RX ADMIN — RANOLAZINE 500 MILLIGRAM(S): 500 TABLET, FILM COATED, EXTENDED RELEASE ORAL at 11:24

## 2023-03-31 RX ADMIN — ISOSORBIDE MONONITRATE 30 MILLIGRAM(S): 60 TABLET, EXTENDED RELEASE ORAL at 11:24

## 2023-03-31 RX ADMIN — Medication 81 MILLIGRAM(S): at 11:24

## 2023-03-31 RX ADMIN — Medication 1: at 08:01

## 2023-03-31 NOTE — PROGRESS NOTE ADULT - SUBJECTIVE AND OBJECTIVE BOX
Patient is a 72y old  Male who presents with a chief complaint of Ischemic Work up (31 Mar 2023 11:23)      SUBJECTIVE / OVERNIGHT EVENTS:    MEDICATIONS  (STANDING):  aspirin  chewable 81 milliGRAM(s) Oral daily  atorvastatin 40 milliGRAM(s) Oral at bedtime  chlorhexidine 2% Cloths 1 Application(s) Topical <User Schedule>  clopidogrel Tablet 75 milliGRAM(s) Oral at bedtime  dextrose 5%. 1000 milliLiter(s) (100 mL/Hr) IV Continuous <Continuous>  dextrose 5%. 1000 milliLiter(s) (50 mL/Hr) IV Continuous <Continuous>  dextrose 50% Injectable 25 Gram(s) IV Push once  dextrose 50% Injectable 12.5 Gram(s) IV Push once  dextrose 50% Injectable 25 Gram(s) IV Push once  gabapentin 100 milliGRAM(s) Oral at bedtime  glucagon  Injectable 1 milliGRAM(s) IntraMuscular once  insulin lispro (ADMELOG) corrective regimen sliding scale   SubCutaneous three times a day before meals  insulin lispro (ADMELOG) corrective regimen sliding scale   SubCutaneous at bedtime  isosorbide   mononitrate ER Tablet (IMDUR) 30 milliGRAM(s) Oral daily  metoprolol succinate ER 25 milliGRAM(s) Oral daily  ranolazine 500 milliGRAM(s) Oral daily    MEDICATIONS  (PRN):  acetaminophen     Tablet .. 650 milliGRAM(s) Oral every 6 hours PRN Temp greater or equal to 38C (100.4F), Mild Pain (1 - 3)  dextrose Oral Gel 15 Gram(s) Oral once PRN Blood Glucose LESS THAN 70 milliGRAM(s)/deciliter      Vital Signs Last 24 Hrs  T(F): 98.6 (03-31-23 @ 18:12), Max: 98.6 (03-31-23 @ 18:12)  HR: 78 (03-31-23 @ 18:42) (65 - 94)  BP: 136/73 (03-31-23 @ 18:42) (106/61 - 136/73)  RR: 18 (03-31-23 @ 18:12) (14 - 18)  SpO2: 100% (03-31-23 @ 18:12) (94% - 100%)  Telemetry:   CAPILLARY BLOOD GLUCOSE      POCT Blood Glucose.: 135 mg/dL (31 Mar 2023 16:38)  POCT Blood Glucose.: 188 mg/dL (31 Mar 2023 11:57)  POCT Blood Glucose.: 171 mg/dL (31 Mar 2023 07:43)  POCT Blood Glucose.: 160 mg/dL (31 Mar 2023 04:52)  POCT Blood Glucose.: 144 mg/dL (30 Mar 2023 21:46)    I&O's Summary    30 Mar 2023 07:01  -  31 Mar 2023 07:00  --------------------------------------------------------  IN: 780 mL / OUT: 600 mL / NET: 180 mL        PHYSICAL EXAM:  GENERAL: NAD, well-developed  HEAD:  Atraumatic, Normocephalic  EYES: EOMI, PERRLA, conjunctiva and sclera clear  NECK: Supple, No JVD  CHEST/LUNG: Clear to auscultation bilaterally; No wheeze  HEART: Regular rate and rhythm; No murmurs, rubs, or gallops  ABDOMEN: Soft, Nontender, Nondistended; Bowel sounds present  EXTREMITIES:  2+ Peripheral Pulses, No clubbing, cyanosis, or edema  PSYCH: AAOx3  NEUROLOGY: non-focal  SKIN: No rashes or lesions    LABS:                        13.3   3.81  )-----------( 193      ( 31 Mar 2023 06:00 )             39.0     03-31    139  |  104  |  19  ----------------------------<  177<H>  4.1   |  24  |  1.01    Ca    9.3      31 Mar 2023 06:00                RADIOLOGY & ADDITIONAL TESTS:    Imaging Personally Reviewed:    Consultant(s) Notes Reviewed:      Care Discussed with Consultants/Other Providers:

## 2023-03-31 NOTE — PROGRESS NOTE ADULT - SUBJECTIVE AND OBJECTIVE BOX
C A R D I O L O G Y  **********************************     DATE OF SERVICE: 03-31-23    Patient has PULIDO. Chest pain improving. denies palpitations. Review of symptoms otherwise negative.    MEDICATIONS:  acetaminophen     Tablet .. 650 milliGRAM(s) Oral every 6 hours PRN  aspirin  chewable 81 milliGRAM(s) Oral daily  atorvastatin 40 milliGRAM(s) Oral at bedtime  chlorhexidine 2% Cloths 1 Application(s) Topical <User Schedule>  clopidogrel Tablet 75 milliGRAM(s) Oral at bedtime  dextrose 5%. 1000 milliLiter(s) IV Continuous <Continuous>  dextrose 5%. 1000 milliLiter(s) IV Continuous <Continuous>  dextrose 50% Injectable 25 Gram(s) IV Push once  dextrose 50% Injectable 12.5 Gram(s) IV Push once  dextrose 50% Injectable 25 Gram(s) IV Push once  dextrose Oral Gel 15 Gram(s) Oral once PRN  gabapentin 100 milliGRAM(s) Oral at bedtime  glucagon  Injectable 1 milliGRAM(s) IntraMuscular once  insulin lispro (ADMELOG) corrective regimen sliding scale   SubCutaneous three times a day before meals  insulin lispro (ADMELOG) corrective regimen sliding scale   SubCutaneous at bedtime  isosorbide   mononitrate ER Tablet (IMDUR) 30 milliGRAM(s) Oral daily  metoprolol succinate ER 25 milliGRAM(s) Oral daily  ranolazine 500 milliGRAM(s) Oral daily      LABS:                        13.3   3.81  )-----------( 193      ( 31 Mar 2023 06:00 )             39.0       Hemoglobin: 13.3 g/dL (03-31 @ 06:00)  Hemoglobin: 12.6 g/dL (03-29 @ 13:42)      03-31    139  |  104  |  19  ----------------------------<  177<H>  4.1   |  24  |  1.01    Ca    9.3      31 Mar 2023 06:00  Mg     2.1     03-29    TPro  7.0  /  Alb  4.3  /  TBili  0.4  /  DBili  x   /  AST  26  /  ALT  22  /  AlkPhos  61  03-29    Creatinine Trend: 1.01<--, 0.98<--, 1.02<--    COAGS:     CARDIAC MARKERS ( 29 Mar 2023 13:42 )  x     / x     / 159 U/L / x     / 6.8 ng/mL        PHYSICAL EXAM:  T(C): 36.5 (03-31-23 @ 10:47), Max: 36.8 (03-30-23 @ 20:49)  HR: 68 (03-31-23 @ 10:47) (65 - 77)  BP: 117/78 (03-31-23 @ 10:47) (106/61 - 117/78)  RR: 18 (03-31-23 @ 10:47) (18 - 18)  SpO2: 97% (03-31-23 @ 10:47) (95% - 97%)  Wt(kg): --    I&O's Summary    30 Mar 2023 07:01  -  31 Mar 2023 07:00  --------------------------------------------------------  IN: 780 mL / OUT: 600 mL / NET: 180 mL          Gen: NAD  HEENT:  (-)icterus (-)pallor  CV: N S1 S2 1/6 SHERRY (+)2 Pulses B/l  Resp:  Clear to auscultation B/L, normal effort  GI: (+) BS Soft, NT, ND  Lymph:  (-)Edema, (-)obvious lymphadenopathy  Skin: Warm to touch, Normal turgor  Psych: Appropriate mood and affect      TELEMETRY: SR 60-80s	      ASSESSMENT/PLAN: He is a very pleasant 71 y/o male PMH, hypertension, hyperlipidemia, diabetes, PAD, moderate AS and CAD (see cath report from 2016 in [place]) who is now a/w progressive angina. Fortunately his EKG is unchanged (non-specific inferior Twi) and his troponin is low normal (17). His most recent NST is our office showed no ischemia, and his most recent echo showed normal LVEF with moderate AS. There is a clinical concern the NST is a false negative given his recurrent angina. He denies palpitations nor syncope.    -continue plavix and asa for CAD and PAD  -continue zocor for hyperlipidemia  -continue metorpolol, ranexa and imdur for refractory angina  -TTE with mod AS  -Plan for cath today given unstable angina   -f/u with Mittle after discharge    Moris Villalta PA-C  Pager: [phone]

## 2023-04-01 LAB
GLUCOSE BLDC GLUCOMTR-MCNC: 179 MG/DL — HIGH (ref 70–99)
GLUCOSE BLDC GLUCOMTR-MCNC: 193 MG/DL — HIGH (ref 70–99)
GLUCOSE BLDC GLUCOMTR-MCNC: 200 MG/DL — HIGH (ref 70–99)
GLUCOSE BLDC GLUCOMTR-MCNC: 213 MG/DL — HIGH (ref 70–99)

## 2023-04-01 RX ADMIN — Medication 25 MILLIGRAM(S): at 06:45

## 2023-04-01 RX ADMIN — CHLORHEXIDINE GLUCONATE 1 APPLICATION(S): 213 SOLUTION TOPICAL at 06:47

## 2023-04-01 RX ADMIN — Medication 2: at 16:45

## 2023-04-01 RX ADMIN — Medication 1: at 12:32

## 2023-04-01 RX ADMIN — CLOPIDOGREL BISULFATE 75 MILLIGRAM(S): 75 TABLET, FILM COATED ORAL at 22:00

## 2023-04-01 RX ADMIN — Medication 1: at 08:27

## 2023-04-01 RX ADMIN — ISOSORBIDE MONONITRATE 30 MILLIGRAM(S): 60 TABLET, EXTENDED RELEASE ORAL at 11:39

## 2023-04-01 RX ADMIN — GABAPENTIN 100 MILLIGRAM(S): 400 CAPSULE ORAL at 22:01

## 2023-04-01 RX ADMIN — ATORVASTATIN CALCIUM 40 MILLIGRAM(S): 80 TABLET, FILM COATED ORAL at 22:00

## 2023-04-01 RX ADMIN — RANOLAZINE 500 MILLIGRAM(S): 500 TABLET, FILM COATED, EXTENDED RELEASE ORAL at 11:39

## 2023-04-01 RX ADMIN — Medication 81 MILLIGRAM(S): at 11:39

## 2023-04-01 NOTE — PROGRESS NOTE ADULT - PROBLEM SELECTOR PLAN 1
Possibly cardiac equivalent or just deconditioning  Monitor on telemetry  Optimize CAD as below  s/p LHC, 100%RCA, 40% diagn, mod CAD, plan to F/U on outptn basis
Possibly cardiac equivalent or just deconditioning  Monitor on telemetry  Optimize CAD as below  Possible  cath tomorrow
Possibly cardiac equivalent or just deconditioning  Monitor on telemetry  Optimize CAD as below  Possible  cath tomorrow

## 2023-04-01 NOTE — PROGRESS NOTE ADULT - PROBLEM SELECTOR PLAN 4
Holding home Invokana, Januvia, Metformin, and Glipizide  FS TID AC & insulin sliding scale

## 2023-04-01 NOTE — PROGRESS NOTE ADULT - NS ATTEND AMEND GEN_ALL_CORE FT
Patient seen and examined. Agree with plan as detailed in PA/NP Note.     Cath noted, RCA  with  moderate disease in left system, c/w anti-anginal and medical management, no chest pain, can be discharged with outpatient f/u    Herbie Balderas MD  Pager: [phone]

## 2023-04-01 NOTE — PROGRESS NOTE ADULT - ASSESSMENT
72M with PMHx of CAD (with several stents), moderate AS, PAD, And T2DM presenting with dyspnea on exertion and possibly stable angina.
72M with PMHx of CAD (with several stents), moderate AS, PAD, And T2DM presenting with dyspnea on exertion and possibly stable angina.
Patient seen and examined, agree with above assessment and plan as transcribed above.    - Moderate AS  - for cath today    Jorden Anthony MD, [facility]  BEEPER [phone]  
Patient seen and examined, agree with above assessment and plan as transcribed above.    - awaiting echo    Jorden Anthony MD, [facility]  BEEPER [phone]  
72M with PMHx of CAD (with several stents), moderate AS, PAD, And T2DM presenting with dyspnea on exertion and possibly stable angina.

## 2023-04-01 NOTE — PROGRESS NOTE ADULT - PROBLEM SELECTOR PLAN 3
Continue with DAPT, statin, beta blocker, Ranexa, and Isosorbide  Cardiology following

## 2023-04-01 NOTE — PROGRESS NOTE ADULT - PROBLEM SELECTOR PLAN 5
Continue with DAPT  Continue with statin

## 2023-04-01 NOTE — PROGRESS NOTE ADULT - PROBLEM SELECTOR PLAN 2
TTE to assess  Patient told he may require KENN in the future

## 2023-04-01 NOTE — PROGRESS NOTE ADULT - SUBJECTIVE AND OBJECTIVE BOX
Patient is a 72y old  Male who presents with a chief complaint of Ischemic Work up (01 Apr 2023 10:05)      SUBJECTIVE / OVERNIGHT EVENTS:    MEDICATIONS  (STANDING):  aspirin  chewable 81 milliGRAM(s) Oral daily  atorvastatin 40 milliGRAM(s) Oral at bedtime  chlorhexidine 2% Cloths 1 Application(s) Topical <User Schedule>  clopidogrel Tablet 75 milliGRAM(s) Oral at bedtime  dextrose 5%. 1000 milliLiter(s) (100 mL/Hr) IV Continuous <Continuous>  dextrose 5%. 1000 milliLiter(s) (50 mL/Hr) IV Continuous <Continuous>  dextrose 50% Injectable 25 Gram(s) IV Push once  dextrose 50% Injectable 12.5 Gram(s) IV Push once  dextrose 50% Injectable 25 Gram(s) IV Push once  gabapentin 100 milliGRAM(s) Oral at bedtime  glucagon  Injectable 1 milliGRAM(s) IntraMuscular once  insulin lispro (ADMELOG) corrective regimen sliding scale   SubCutaneous three times a day before meals  insulin lispro (ADMELOG) corrective regimen sliding scale   SubCutaneous at bedtime  isosorbide   mononitrate ER Tablet (IMDUR) 30 milliGRAM(s) Oral daily  metoprolol succinate ER 25 milliGRAM(s) Oral daily  ranolazine 500 milliGRAM(s) Oral daily    MEDICATIONS  (PRN):  acetaminophen     Tablet .. 650 milliGRAM(s) Oral every 6 hours PRN Temp greater or equal to 38C (100.4F), Mild Pain (1 - 3)  dextrose Oral Gel 15 Gram(s) Oral once PRN Blood Glucose LESS THAN 70 milliGRAM(s)/deciliter      Vital Signs Last 24 Hrs  T(F): 97.9 (04-01-23 @ 11:45), Max: 98.3 (03-31-23 @ 20:33)  HR: 62 (04-01-23 @ 11:45) (62 - 86)  BP: 122/74 (04-01-23 @ 11:45) (108/62 - 122/74)  RR: 18 (04-01-23 @ 11:45) (18 - 18)  SpO2: 99% (04-01-23 @ 11:45) (96% - 99%)  Telemetry:   CAPILLARY BLOOD GLUCOSE      POCT Blood Glucose.: 213 mg/dL (01 Apr 2023 16:35)  POCT Blood Glucose.: 193 mg/dL (01 Apr 2023 12:23)  POCT Blood Glucose.: 200 mg/dL (01 Apr 2023 07:43)  POCT Blood Glucose.: 203 mg/dL (31 Mar 2023 21:48)    I&O's Summary      PHYSICAL EXAM:  GENERAL: NAD, well-developed  HEAD:  Atraumatic, Normocephalic  EYES: EOMI, PERRLA, conjunctiva and sclera clear  NECK: Supple, No JVD  CHEST/LUNG: Clear to auscultation bilaterally; No wheeze  HEART: Regular rate and rhythm; No murmurs, rubs, or gallops  ABDOMEN: Soft, Nontender, Nondistended; Bowel sounds present  EXTREMITIES:  2+ Peripheral Pulses, No clubbing, cyanosis, or edema  PSYCH: AAOx3  NEUROLOGY: non-focal  SKIN: No rashes or lesions    LABS:                        13.3   3.81  )-----------( 193      ( 31 Mar 2023 06:00 )             39.0     03-31    139  |  104  |  19  ----------------------------<  177<H>  4.1   |  24  |  1.01    Ca    9.3      31 Mar 2023 06:00                RADIOLOGY & ADDITIONAL TESTS:    Imaging Personally Reviewed:    Consultant(s) Notes Reviewed:      Care Discussed with Consultants/Other Providers:

## 2023-04-01 NOTE — PROGRESS NOTE ADULT - SUBJECTIVE AND OBJECTIVE BOX
C A R D I O L O G Y  **********************************     DATE OF SERVICE: 04-1-23     pt seen and examined, no complaints, ROS - .        acetaminophen     Tablet .. 650 milliGRAM(s) Oral every 6 hours PRN  aspirin  chewable 81 milliGRAM(s) Oral daily  atorvastatin 40 milliGRAM(s) Oral at bedtime  chlorhexidine 2% Cloths 1 Application(s) Topical <User Schedule>  clopidogrel Tablet 75 milliGRAM(s) Oral at bedtime  dextrose 5%. 1000 milliLiter(s) IV Continuous <Continuous>  dextrose 5%. 1000 milliLiter(s) IV Continuous <Continuous>  dextrose 50% Injectable 25 Gram(s) IV Push once  dextrose 50% Injectable 12.5 Gram(s) IV Push once  dextrose 50% Injectable 25 Gram(s) IV Push once  dextrose Oral Gel 15 Gram(s) Oral once PRN  gabapentin 100 milliGRAM(s) Oral at bedtime  glucagon  Injectable 1 milliGRAM(s) IntraMuscular once  insulin lispro (ADMELOG) corrective regimen sliding scale   SubCutaneous three times a day before meals  insulin lispro (ADMELOG) corrective regimen sliding scale   SubCutaneous at bedtime  isosorbide   mononitrate ER Tablet (IMDUR) 30 milliGRAM(s) Oral daily  metoprolol succinate ER 25 milliGRAM(s) Oral daily  ranolazine 500 milliGRAM(s) Oral daily                            13.3   3.81  )-----------( 193      ( 31 Mar 2023 06:00 )             39.0       Hemoglobin: 13.3 g/dL (03-31 @ 06:00)  Hemoglobin: 12.6 g/dL (03-29 @ 13:42)      03-31    139  |  104  |  19  ----------------------------<  177<H>  4.1   |  24  |  1.01    Ca    9.3      31 Mar 2023 06:00      Creatinine Trend: 1.01<--, 0.98<--, 1.02<--    COAGS:     CARDIAC MARKERS ( 29 Mar 2023 13:42 )  x     / x     / 159 U/L / x     / 6.8 ng/mL        T(C): 36.6 (04-01-23 @ 03:55), Max: 37 (03-31-23 @ 18:12)  HR: 78 (04-01-23 @ 03:55) (68 - 94)  BP: 120/70 (04-01-23 @ 03:55) (108/62 - 136/73)  RR: 18 (04-01-23 @ 03:55) (14 - 18)  SpO2: 96% (04-01-23 @ 03:55) (94% - 100%)  Wt(kg): --    I&O's Summary      Gen: NAD  HEENT:  (-)icterus (-)pallor  CV: N S1 S2 1/6 SHERRY (+)2 Pulses B/l  Resp:  Clear to auscultation B/L, normal effort  GI: (+) BS Soft, NT, ND  Lymph:  (-)Edema, (-)obvious lymphadenopathy  Skin: Warm to touch, Normal turgor  Psych: Appropriate mood and affect    Cardiac cath: 100% stenosis to RCA and 40% to diag     TELEMETRY: SR 60-80s	      ASSESSMENT/PLAN: He is a very pleasant 73 y/o male PMH, hypertension, hyperlipidemia, diabetes, PAD, moderate AS and CAD (see cath report from 2016 in [place]) who is now a/w progressive angina. Fortunately his EKG is unchanged (non-specific inferior Twi) and his troponin is low normal (17). His most recent NST is our office showed no ischemia, and his most recent echo showed normal LVEF with moderate AS. There is a clinical concern the NST is a false negative given his recurrent angina. He denies palpitations nor syncope.    - RRA site c/d/i   -continue plavix and asa for CAD and PAD  -continue zocor for hyperlipidemia  -continue metorpolol, ranexa and imdur for refractory angina  -TTE with mod AS  - cath noted   -f/u with Mittle after discharge

## 2023-04-02 ENCOUNTER — TRANSCRIPTION ENCOUNTER (OUTPATIENT)
Age: 73
End: 2023-04-02

## 2023-04-02 VITALS
HEART RATE: 64 BPM | DIASTOLIC BLOOD PRESSURE: 64 MMHG | SYSTOLIC BLOOD PRESSURE: 119 MMHG | RESPIRATION RATE: 18 BRPM | TEMPERATURE: 98 F | OXYGEN SATURATION: 98 %

## 2023-04-02 LAB — GLUCOSE BLDC GLUCOMTR-MCNC: 201 MG/DL — HIGH (ref 70–99)

## 2023-04-02 PROCEDURE — 84484 ASSAY OF TROPONIN QUANT: CPT

## 2023-04-02 PROCEDURE — 99285 EMERGENCY DEPT VISIT HI MDM: CPT

## 2023-04-02 PROCEDURE — 36415 COLL VENOUS BLD VENIPUNCTURE: CPT

## 2023-04-02 PROCEDURE — 83036 HEMOGLOBIN GLYCOSYLATED A1C: CPT

## 2023-04-02 PROCEDURE — C1769: CPT

## 2023-04-02 PROCEDURE — 80048 BASIC METABOLIC PNL TOTAL CA: CPT

## 2023-04-02 PROCEDURE — 82553 CREATINE MB FRACTION: CPT

## 2023-04-02 PROCEDURE — 83690 ASSAY OF LIPASE: CPT

## 2023-04-02 PROCEDURE — 87640 STAPH A DNA AMP PROBE: CPT

## 2023-04-02 PROCEDURE — C1887: CPT

## 2023-04-02 PROCEDURE — 80053 COMPREHEN METABOLIC PANEL: CPT

## 2023-04-02 PROCEDURE — 82962 GLUCOSE BLOOD TEST: CPT

## 2023-04-02 PROCEDURE — 93306 TTE W/DOPPLER COMPLETE: CPT

## 2023-04-02 PROCEDURE — 85025 COMPLETE CBC W/AUTO DIFF WBC: CPT

## 2023-04-02 PROCEDURE — 87641 MR-STAPH DNA AMP PROBE: CPT

## 2023-04-02 PROCEDURE — 83735 ASSAY OF MAGNESIUM: CPT

## 2023-04-02 PROCEDURE — 71046 X-RAY EXAM CHEST 2 VIEWS: CPT

## 2023-04-02 PROCEDURE — 0225U NFCT DS DNA&RNA 21 SARSCOV2: CPT

## 2023-04-02 PROCEDURE — 82550 ASSAY OF CK (CPK): CPT

## 2023-04-02 PROCEDURE — 85027 COMPLETE CBC AUTOMATED: CPT

## 2023-04-02 PROCEDURE — 83880 ASSAY OF NATRIURETIC PEPTIDE: CPT

## 2023-04-02 PROCEDURE — 93454 CORONARY ARTERY ANGIO S&I: CPT

## 2023-04-02 PROCEDURE — C1894: CPT

## 2023-04-02 RX ORDER — ACETAMINOPHEN 500 MG
2 TABLET ORAL
Qty: 0 | Refills: 0 | DISCHARGE
Start: 2023-04-02

## 2023-04-02 RX ADMIN — CHLORHEXIDINE GLUCONATE 1 APPLICATION(S): 213 SOLUTION TOPICAL at 05:04

## 2023-04-02 RX ADMIN — RANOLAZINE 500 MILLIGRAM(S): 500 TABLET, FILM COATED, EXTENDED RELEASE ORAL at 11:15

## 2023-04-02 RX ADMIN — ISOSORBIDE MONONITRATE 30 MILLIGRAM(S): 60 TABLET, EXTENDED RELEASE ORAL at 11:15

## 2023-04-02 RX ADMIN — Medication 25 MILLIGRAM(S): at 05:03

## 2023-04-02 RX ADMIN — Medication 81 MILLIGRAM(S): at 11:14

## 2023-04-02 RX ADMIN — Medication 2: at 08:18

## 2023-04-02 NOTE — DISCHARGE NOTE PROVIDER - NSDCCPCAREPLAN_GEN_ALL_CORE_FT
PRINCIPAL DISCHARGE DIAGNOSIS  Diagnosis: CAD (coronary artery disease)  Assessment and Plan of Treatment: Take medication as prescribed  Follow up with your cardiologist      SECONDARY DISCHARGE DIAGNOSES  Diagnosis: Diabetes  Assessment and Plan of Treatment: Type 2, Controoled, Uncomplicated, A1c: 6.4  Take medication as prescribed  Follow up with your primary care provider

## 2023-04-02 NOTE — DISCHARGE NOTE PROVIDER - HOSPITAL COURSE
72M with PMHx of CAD (with several stents), moderate AS, PAD, And T2DM presenting with dyspnea on exertion and possibly stable angina.      1. Chronic stable angina.   - Possibly cardiac equivalent or just deconditioning  Monitor on telemetry  Optimize CAD as below  s/p LHC, 100%RCA, 40% diagn, mod CAD, plan to F/U on outpatient basis.    2. Aortic stenosis.   - cardiology following    3. CAD (coronary artery disease).   - Continue with DAPT, statin, beta blocker, Ranexa, and Isosorbide  - Cardiology following.    4. Diabetes.   -Holding home Invokana, Januvia, Metformin, and Glipizide  -FS TID AC & insulin sliding scale.    5. PAD (peripheral artery disease).   ·  Plan: Continue with DAPT  Continue with statin.     72M with PMHx of CAD (with several stents), moderate AS, PAD, And T2DM presenting with dyspnea on exertion and possibly stable angina.      1. Chronic stable angina.   - Possibly cardiac equivalent or just deconditioning  Monitor on telemetry  Optimize CAD as below  s/p LHC, 100%RCA, 40% diagn, mod CAD, plan to F/U on outpatient basis.    2. Aortic stenosis.   - cardiology following    3. CAD (coronary artery disease).   - Continue with DAPT, statin, beta blocker, Ranexa, and Isosorbide  - Cardiology following.    4. Diabetes.   -Holding home Invokana, Januvia, Metformin, and Glipizide  -FS TID AC & insulin sliding scale.    5. PAD (peripheral artery disease).   -Continue with DAPT and statin.

## 2023-04-02 NOTE — DISCHARGE NOTE PROVIDER - NSDCFUSCHEDAPPT_GEN_ALL_CORE_FT
[facility]  ULTRASND  [facility]  Scheduled Appointment: 04/06/2023    Tomer Taylor  [facility]  [address]CRIN [address] [facility] [address]  Scheduled Appointment: 04/19/2023

## 2023-04-02 NOTE — PROGRESS NOTE ADULT - SUBJECTIVE AND OBJECTIVE BOX
C A R D I O L O G Y  **********************************     DATE OF SERVICE: 04-2-23    no events overnight   no chest pain or sob        acetaminophen     Tablet .. 650 milliGRAM(s) Oral every 6 hours PRN  aspirin  chewable 81 milliGRAM(s) Oral daily  atorvastatin 40 milliGRAM(s) Oral at bedtime  chlorhexidine 2% Cloths 1 Application(s) Topical <User Schedule>  clopidogrel Tablet 75 milliGRAM(s) Oral at bedtime  dextrose 5%. 1000 milliLiter(s) IV Continuous <Continuous>  dextrose 5%. 1000 milliLiter(s) IV Continuous <Continuous>  dextrose 50% Injectable 25 Gram(s) IV Push once  dextrose 50% Injectable 12.5 Gram(s) IV Push once  dextrose 50% Injectable 25 Gram(s) IV Push once  dextrose Oral Gel 15 Gram(s) Oral once PRN  gabapentin 100 milliGRAM(s) Oral at bedtime  glucagon  Injectable 1 milliGRAM(s) IntraMuscular once  insulin lispro (ADMELOG) corrective regimen sliding scale   SubCutaneous three times a day before meals  insulin lispro (ADMELOG) corrective regimen sliding scale   SubCutaneous at bedtime  isosorbide   mononitrate ER Tablet (IMDUR) 30 milliGRAM(s) Oral daily  metoprolol succinate ER 25 milliGRAM(s) Oral daily  ranolazine 500 milliGRAM(s) Oral daily          Hemoglobin: 13.3 g/dL (03-31 @ 06:00)  Hemoglobin: 12.6 g/dL (03-29 @ 13:42)            Creatinine Trend: 1.01<--, 0.98<--, 1.02<--    COAGS:           T(C): 36.7 (04-02-23 @ 04:20), Max: 36.7 (04-02-23 @ 04:20)  HR: 63 (04-02-23 @ 04:20) (62 - 71)  BP: 130/67 (04-02-23 @ 04:20) (112/64 - 130/67)  RR: 18 (04-02-23 @ 04:20) (18 - 18)  SpO2: 98% (04-02-23 @ 04:20) (98% - 99%)  Wt(kg): --    I&O's Summary      Gen: NAD  HEENT:  (-)icterus (-)pallor  CV: N S1 S2 1/6 SHERRY (+)2 Pulses B/l  Resp:  Clear to auscultation B/L, normal effort  GI: (+) BS Soft, NT, ND  Lymph:  (-)Edema, (-)obvious lymphadenopathy  Skin: Warm to touch, Normal turgor  Psych: Appropriate mood and affect    Cardiac cath: 100% stenosis to RCA and 40% to diag     TELEMETRY: SR 60-80s	      ASSESSMENT/PLAN: He is a very pleasant 73 y/o male PMH, hypertension, hyperlipidemia, diabetes, PAD, moderate AS and CAD (see cath report from 2016 in [place]) who is now a/w progressive angina. Fortunately his EKG is unchanged (non-specific inferior Twi) and his troponin is low normal (17). His most recent NST is our office showed no ischemia, and his most recent echo showed normal LVEF with moderate AS. There is a clinical concern the NST is a false negative given his recurrent angina. He denies palpitations nor syncope.    - RRA site c/d/i   -continue plavix and asa for CAD and PAD  -continue zocor for hyperlipidemia  -continue metorpolol, ranexa and imdur for refractory angina  -TTE with mod AS  - cath noted   -f/u with Mittle after discharge    C A R D I O L O G Y  **********************************     DATE OF SERVICE: 04-2-23    no events overnight   no chest pain or sob        acetaminophen     Tablet .. 650 milliGRAM(s) Oral every 6 hours PRN  aspirin  chewable 81 milliGRAM(s) Oral daily  atorvastatin 40 milliGRAM(s) Oral at bedtime  chlorhexidine 2% Cloths 1 Application(s) Topical <User Schedule>  clopidogrel Tablet 75 milliGRAM(s) Oral at bedtime  dextrose 5%. 1000 milliLiter(s) IV Continuous <Continuous>  dextrose 5%. 1000 milliLiter(s) IV Continuous <Continuous>  dextrose 50% Injectable 25 Gram(s) IV Push once  dextrose 50% Injectable 12.5 Gram(s) IV Push once  dextrose 50% Injectable 25 Gram(s) IV Push once  dextrose Oral Gel 15 Gram(s) Oral once PRN  gabapentin 100 milliGRAM(s) Oral at bedtime  glucagon  Injectable 1 milliGRAM(s) IntraMuscular once  insulin lispro (ADMELOG) corrective regimen sliding scale   SubCutaneous three times a day before meals  insulin lispro (ADMELOG) corrective regimen sliding scale   SubCutaneous at bedtime  isosorbide   mononitrate ER Tablet (IMDUR) 30 milliGRAM(s) Oral daily  metoprolol succinate ER 25 milliGRAM(s) Oral daily  ranolazine 500 milliGRAM(s) Oral daily          Hemoglobin: 13.3 g/dL (03-31 @ 06:00)  Hemoglobin: 12.6 g/dL (03-29 @ 13:42)            Creatinine Trend: 1.01<--, 0.98<--, 1.02<--    COAGS:           T(C): 36.7 (04-02-23 @ 04:20), Max: 36.7 (04-02-23 @ 04:20)  HR: 63 (04-02-23 @ 04:20) (62 - 71)  BP: 130/67 (04-02-23 @ 04:20) (112/64 - 130/67)  RR: 18 (04-02-23 @ 04:20) (18 - 18)  SpO2: 98% (04-02-23 @ 04:20) (98% - 99%)  Wt(kg): --    I&O's Summary      Gen: NAD  HEENT:  (-)icterus (-)pallor  CV: N S1 S2 1/6 SHERRY (+)2 Pulses B/l  Resp:  Clear to auscultation B/L, normal effort  GI: (+) BS Soft, NT, ND  Lymph:  (-)Edema, (-)obvious lymphadenopathy  Skin: Warm to touch, Normal turgor  Psych: Appropriate mood and affect    Cardiac cath: 100% stenosis to RCA and 40% to diag     TELEMETRY: SR 60-80s	      ASSESSMENT/PLAN: He is a very pleasant 71 y/o male PMH, hypertension, hyperlipidemia, diabetes, PAD, moderate AS and CAD (see cath report from 2016 in [place]) who is now a/w progressive angina. Fortunately his EKG is unchanged (non-specific inferior Twi) and his troponin is low normal (17). His most recent NST is our office showed no ischemia, and his most recent echo showed normal LVEF with moderate AS. There is a clinical concern the NST is a false negative given his recurrent angina. He denies palpitations nor syncope.    -continue plavix and asa for CAD and PAD  -continue zocor for hyperlipidemia  -continue Metoprolol, Ranexa and IMDUR for refractory angina  -TTE with mod AS  - cath noted   -f/u with Mittle after discharge

## 2023-04-02 NOTE — PROGRESS NOTE ADULT - REASON FOR ADMISSION
Ischemic Work up

## 2023-04-02 NOTE — DISCHARGE NOTE NURSING/CASE MANAGEMENT/SOCIAL WORK - PATIENT PORTAL LINK FT
You can access the FollowMyHealth Patient Portal offered by [facility] by registering at the following website: [email or web address]. By joining [employer]’s FollowMyHealth portal, you will also be able to view your health information using other applications (apps) compatible with our system.

## 2023-04-02 NOTE — DISCHARGE NOTE NURSING/CASE MANAGEMENT/SOCIAL WORK - NSDCVIVACCINE_GEN_ALL_CORE_FT
influenza, injectable, quadrivalent, preservative free; 21-Sep-2018 18:03; Asiya Bhakta (YENY); [employer]; 53NN (Exp. Date: 30-Jun-2019); IntraMuscular; Deltoid Left.; 0.5 milliLiter(s); VIS (VIS Published: 07-Aug-2015, VIS Presented: 21-Sep-2018);

## 2023-04-02 NOTE — DISCHARGE NOTE PROVIDER - CARE PROVIDER_API CALL
Herbie Balderas)  Internal Medicine  [address] [facility], [place] [address]  Phone: [phone]  Fax: [phone]  Follow Up Time:    Jesse Mckeon)  Cardiology  [address] [facility], [address]  [place] [address]  Phone: [phone]  Fax: [phone]  Follow Up Time:

## 2023-04-02 NOTE — DISCHARGE NOTE PROVIDER - NSDCMRMEDTOKEN_GEN_ALL_CORE_FT
Sameer Low Dose 81 mg oral delayed release tablet: 1 tab(s) orally once a day  gabapentin 100 mg oral capsule: 1 cap(s) orally once a day  glipiZIDE 10 mg oral tablet, extended release: 1 tab(s) orally once a day (in the evening)  Invokana 100 mg oral tablet: 1 tab(s) orally once a day  isosorbide mononitrate 30 mg oral tablet, extended release: 1 tab(s) orally once a day  Januvia 100 mg oral tablet: 1 tab(s) orally once a day  metFORMIN 1000 mg oral tablet: 1 tab(s) orally 2 times a day  metoprolol succinate 25 mg oral tablet, extended release: 1 tab(s) orally once a day  Plavix 75 mg oral tablet: 1 tab(s) orally once a day (in the evening)  ranolazine 500 mg oral tablet, extended release: 1 tab(s) orally once a day  simvastatin 80 mg oral tablet: 1 tab(s) orally once a day (at bedtime)   acetaminophen 325 mg oral tablet: 2 tab(s) orally every 6 hours As needed Temp greater or equal to 38C (100.4F), Mild Pain (1 - 3)  Sameer Low Dose 81 mg oral delayed release tablet: 1 tab(s) orally once a day  gabapentin 100 mg oral capsule: 1 cap(s) orally once a day  glipiZIDE 10 mg oral tablet, extended release: 1 tab(s) orally once a day (in the evening)  Invokana 100 mg oral tablet: 1 tab(s) orally once a day  isosorbide mononitrate 30 mg oral tablet, extended release: 1 tab(s) orally once a day  metFORMIN 1000 mg oral tablet: 1 tab(s) orally 2 times a day  metoprolol succinate 25 mg oral tablet, extended release: 1 tab(s) orally once a day  Plavix 75 mg oral tablet: 1 tab(s) orally once a day (in the evening)  ranolazine 500 mg oral tablet, extended release: 1 tab(s) orally once a day  simvastatin 80 mg oral tablet: 1 tab(s) orally once a day (at bedtime)

## 2023-04-06 ENCOUNTER — RESULT REVIEW (OUTPATIENT)
Age: 73
End: 2023-04-06

## 2023-04-06 ENCOUNTER — OUTPATIENT (OUTPATIENT)
Dept: OUTPATIENT SERVICES | Facility: HOSPITAL | Age: 73
LOS: 1 days | End: 2023-04-06
Payer: MEDICARE

## 2023-04-06 ENCOUNTER — APPOINTMENT (OUTPATIENT)
Dept: ULTRASOUND IMAGING | Facility: IMAGING CENTER | Age: 73
End: 2023-04-06
Payer: MEDICARE

## 2023-04-06 DIAGNOSIS — Z90.49 ACQUIRED ABSENCE OF OTHER SPECIFIED PARTS OF DIGESTIVE TRACT: Chronic | ICD-10-CM

## 2023-04-06 DIAGNOSIS — E04.1 NONTOXIC SINGLE THYROID NODULE: ICD-10-CM

## 2023-04-06 DIAGNOSIS — Z95.5 PRESENCE OF CORONARY ANGIOPLASTY IMPLANT AND GRAFT: Chronic | ICD-10-CM

## 2023-04-06 PROCEDURE — 10005 FNA BX W/US GDN 1ST LES: CPT

## 2023-04-06 PROCEDURE — 88173 CYTOPATH EVAL FNA REPORT: CPT

## 2023-04-06 PROCEDURE — 88173 CYTOPATH EVAL FNA REPORT: CPT | Mod: 26

## 2023-04-10 LAB — NON-GYNECOLOGICAL CYTOLOGY STUDY: SIGNIFICANT CHANGE UP

## 2023-04-19 ENCOUNTER — APPOINTMENT (OUTPATIENT)
Dept: ENDOCRINOLOGY | Facility: CLINIC | Age: 73
End: 2023-04-19
Payer: MEDICARE

## 2023-04-19 VITALS
TEMPERATURE: 98.4 F | HEIGHT: 68 IN | OXYGEN SATURATION: 97 % | SYSTOLIC BLOOD PRESSURE: 132 MMHG | BODY MASS INDEX: 24.31 KG/M2 | HEART RATE: 88 BPM | DIASTOLIC BLOOD PRESSURE: 72 MMHG | WEIGHT: 160.44 LBS

## 2023-04-19 DIAGNOSIS — E04.1 NONTOXIC SINGLE THYROID NODULE: ICD-10-CM

## 2023-04-19 LAB — GLUCOSE BLDC GLUCOMTR-MCNC: 136

## 2023-04-19 PROCEDURE — 99213 OFFICE O/P EST LOW 20 MIN: CPT

## 2023-04-19 PROCEDURE — 82962 GLUCOSE BLOOD TEST: CPT

## 2023-04-19 NOTE — PHYSICAL EXAM
[Alert] : alert [Well Nourished] : well nourished [No Acute Distress] : no acute distress [Well Developed] : well developed [Normal Sclera/Conjunctiva] : normal sclera/conjunctiva [EOMI] : extra ocular movement intact [No Proptosis] : no proptosis [Normal Oropharynx] : the oropharynx was normal [No LAD] : no lymphadenopathy [Thyroid Not Enlarged] : the thyroid was not enlarged [No Respiratory Distress] : no respiratory distress [No Accessory Muscle Use] : no accessory muscle use [Clear to Auscultation] : lungs were clear to auscultation bilaterally [Normal S1, S2] : normal S1 and S2 [Normal Rate] : heart rate was normal [Regular Rhythm] : with a regular rhythm [No Edema] : no peripheral edema [Pedal Pulses Normal] : the pedal pulses are present [Normal Bowel Sounds] : normal bowel sounds [Not Tender] : non-tender [Not Distended] : not distended [Soft] : abdomen soft [Normal Anterior Cervical Nodes] : no anterior cervical lymphadenopathy [No Spinal Tenderness] : no spinal tenderness [Spine Straight] : spine straight [No Stigmata of Cushings Syndrome] : no stigmata of Cushings Syndrome [Normal Gait] : normal gait [Normal Strength/Tone] : muscle strength and tone were normal [No Rash] : no rash [Normal Reflexes] : deep tendon reflexes were 2+ and symmetric [No Tremors] : no tremors [Oriented x3] : oriented to person, place, and time [Acanthosis Nigricans] : no acanthosis nigricans

## 2023-04-19 NOTE — HISTORY OF PRESENT ILLNESS
[FreeTextEntry1] : 72 year M, referred for management of thyroid nodule. \par \par Patient with past medical hx as below, remarkable for T2DM (managed by PCP: A1c at goal 6.6%), HLD\par \par Related thyroid hx: \par \par Patient had incidentally discovered thyroid nodule, by PCP. Was referred for thyroid sonogram and FNA at [place] [facility]. Pathology reports not available at time of visit, and family reports that it was inadequate and he is here for evaluation. \par \par At last visit was sent for FNA \par \par \par Prior or current medication thyroid use: No\par Known family or personal hx of thyroid disease: No\par Goiter or hx of goiter: No\par Known Hx of autoimmune disease: No\par History of Radioactive iodine therapy/ Chest or Neck radiation therapy: No\par \par Dyspnea: No\par Dysphagia: No\par

## 2023-05-09 ENCOUNTER — NON-APPOINTMENT (OUTPATIENT)
Age: 73
End: 2023-05-09

## 2023-05-11 ENCOUNTER — OUTPATIENT (OUTPATIENT)
Dept: OUTPATIENT SERVICES | Facility: HOSPITAL | Age: 73
LOS: 1 days | End: 2023-05-11
Payer: MEDICARE

## 2023-05-11 ENCOUNTER — TRANSCRIPTION ENCOUNTER (OUTPATIENT)
Age: 73
End: 2023-05-11

## 2023-05-11 VITALS
SYSTOLIC BLOOD PRESSURE: 116 MMHG | RESPIRATION RATE: 20 BRPM | HEART RATE: 84 BPM | TEMPERATURE: 98 F | OXYGEN SATURATION: 96 % | DIASTOLIC BLOOD PRESSURE: 83 MMHG

## 2023-05-11 VITALS
SYSTOLIC BLOOD PRESSURE: 154 MMHG | HEART RATE: 72 BPM | OXYGEN SATURATION: 99 % | WEIGHT: 160.06 LBS | HEIGHT: 68 IN | TEMPERATURE: 98 F | RESPIRATION RATE: 16 BRPM | DIASTOLIC BLOOD PRESSURE: 66 MMHG

## 2023-05-11 DIAGNOSIS — Z90.49 ACQUIRED ABSENCE OF OTHER SPECIFIED PARTS OF DIGESTIVE TRACT: Chronic | ICD-10-CM

## 2023-05-11 DIAGNOSIS — I25.10 ATHEROSCLEROTIC HEART DISEASE OF NATIVE CORONARY ARTERY WITHOUT ANGINA PECTORIS: ICD-10-CM

## 2023-05-11 DIAGNOSIS — Z95.5 PRESENCE OF CORONARY ANGIOPLASTY IMPLANT AND GRAFT: Chronic | ICD-10-CM

## 2023-05-11 LAB
ANION GAP SERPL CALC-SCNC: 12 MMOL/L — SIGNIFICANT CHANGE UP (ref 5–17)
BLD GP AB SCN SERPL QL: NEGATIVE — SIGNIFICANT CHANGE UP
BUN SERPL-MCNC: 15 MG/DL — SIGNIFICANT CHANGE UP (ref 7–23)
CALCIUM SERPL-MCNC: 9 MG/DL — SIGNIFICANT CHANGE UP (ref 8.4–10.5)
CHLORIDE SERPL-SCNC: 105 MMOL/L — SIGNIFICANT CHANGE UP (ref 96–108)
CO2 SERPL-SCNC: 23 MMOL/L — SIGNIFICANT CHANGE UP (ref 22–31)
CREAT SERPL-MCNC: 1.11 MG/DL — SIGNIFICANT CHANGE UP (ref 0.5–1.3)
EGFR: 71 ML/MIN/1.73M2 — SIGNIFICANT CHANGE UP
GLUCOSE BLDC GLUCOMTR-MCNC: 128 MG/DL — HIGH (ref 70–99)
GLUCOSE BLDC GLUCOMTR-MCNC: 149 MG/DL — HIGH (ref 70–99)
GLUCOSE SERPL-MCNC: 162 MG/DL — HIGH (ref 70–99)
HCT VFR BLD CALC: 39.8 % — SIGNIFICANT CHANGE UP (ref 39–50)
HGB BLD-MCNC: 13.2 G/DL — SIGNIFICANT CHANGE UP (ref 13–17)
MCHC RBC-ENTMCNC: 30.4 PG — SIGNIFICANT CHANGE UP (ref 27–34)
MCHC RBC-ENTMCNC: 33.2 GM/DL — SIGNIFICANT CHANGE UP (ref 32–36)
MCV RBC AUTO: 91.7 FL — SIGNIFICANT CHANGE UP (ref 80–100)
NRBC # BLD: 0 /100 WBCS — SIGNIFICANT CHANGE UP (ref 0–0)
PLATELET # BLD AUTO: 194 K/UL — SIGNIFICANT CHANGE UP (ref 150–400)
POTASSIUM SERPL-MCNC: 4.3 MMOL/L — SIGNIFICANT CHANGE UP (ref 3.5–5.3)
POTASSIUM SERPL-SCNC: 4.3 MMOL/L — SIGNIFICANT CHANGE UP (ref 3.5–5.3)
RBC # BLD: 4.34 M/UL — SIGNIFICANT CHANGE UP (ref 4.2–5.8)
RBC # FLD: 12.8 % — SIGNIFICANT CHANGE UP (ref 10.3–14.5)
RH IG SCN BLD-IMP: NEGATIVE — SIGNIFICANT CHANGE UP
SODIUM SERPL-SCNC: 140 MMOL/L — SIGNIFICANT CHANGE UP (ref 135–145)
WBC # BLD: 5.07 K/UL — SIGNIFICANT CHANGE UP (ref 3.8–10.5)
WBC # FLD AUTO: 5.07 K/UL — SIGNIFICANT CHANGE UP (ref 3.8–10.5)

## 2023-05-11 PROCEDURE — C1874: CPT

## 2023-05-11 PROCEDURE — 86850 RBC ANTIBODY SCREEN: CPT

## 2023-05-11 PROCEDURE — C1769: CPT

## 2023-05-11 PROCEDURE — 85027 COMPLETE CBC AUTOMATED: CPT

## 2023-05-11 PROCEDURE — 92928 PRQ TCAT PLMT NTRAC ST 1 LES: CPT | Mod: LD

## 2023-05-11 PROCEDURE — C1887: CPT

## 2023-05-11 PROCEDURE — 93005 ELECTROCARDIOGRAM TRACING: CPT

## 2023-05-11 PROCEDURE — C9600: CPT | Mod: LD

## 2023-05-11 PROCEDURE — 82962 GLUCOSE BLOOD TEST: CPT

## 2023-05-11 PROCEDURE — 93010 ELECTROCARDIOGRAM REPORT: CPT | Mod: 76

## 2023-05-11 PROCEDURE — 36415 COLL VENOUS BLD VENIPUNCTURE: CPT

## 2023-05-11 PROCEDURE — 86900 BLOOD TYPING SEROLOGIC ABO: CPT

## 2023-05-11 PROCEDURE — C1894: CPT

## 2023-05-11 PROCEDURE — 80048 BASIC METABOLIC PNL TOTAL CA: CPT

## 2023-05-11 PROCEDURE — C1760: CPT

## 2023-05-11 PROCEDURE — 99152 MOD SED SAME PHYS/QHP 5/>YRS: CPT

## 2023-05-11 PROCEDURE — 86901 BLOOD TYPING SEROLOGIC RH(D): CPT

## 2023-05-11 PROCEDURE — C1725: CPT

## 2023-05-11 RX ORDER — CLOPIDOGREL BISULFATE 75 MG/1
1 TABLET, FILM COATED ORAL
Qty: 0 | Refills: 0 | DISCHARGE

## 2023-05-11 RX ORDER — GLUCAGON INJECTION, SOLUTION 0.5 MG/.1ML
1 INJECTION, SOLUTION SUBCUTANEOUS ONCE
Refills: 0 | Status: DISCONTINUED | OUTPATIENT
Start: 2023-05-11 | End: 2023-05-25

## 2023-05-11 RX ORDER — DEXTROSE 50 % IN WATER 50 %
25 SYRINGE (ML) INTRAVENOUS ONCE
Refills: 0 | Status: DISCONTINUED | OUTPATIENT
Start: 2023-05-11 | End: 2023-05-25

## 2023-05-11 RX ORDER — RANOLAZINE 500 MG/1
500 TABLET, FILM COATED, EXTENDED RELEASE ORAL
Refills: 0 | Status: DISCONTINUED | OUTPATIENT
Start: 2023-05-11 | End: 2023-05-25

## 2023-05-11 RX ORDER — GABAPENTIN 400 MG/1
1 CAPSULE ORAL
Refills: 0 | DISCHARGE

## 2023-05-11 RX ORDER — SODIUM CHLORIDE 9 MG/ML
300 INJECTION INTRAMUSCULAR; INTRAVENOUS; SUBCUTANEOUS
Refills: 0 | Status: DISCONTINUED | OUTPATIENT
Start: 2023-05-11 | End: 2023-05-25

## 2023-05-11 RX ORDER — SODIUM CHLORIDE 9 MG/ML
1000 INJECTION, SOLUTION INTRAVENOUS
Refills: 0 | Status: DISCONTINUED | OUTPATIENT
Start: 2023-05-11 | End: 2023-05-25

## 2023-05-11 RX ORDER — ISOSORBIDE MONONITRATE 60 MG/1
30 TABLET, EXTENDED RELEASE ORAL DAILY
Refills: 0 | Status: DISCONTINUED | OUTPATIENT
Start: 2023-05-11 | End: 2023-05-25

## 2023-05-11 RX ORDER — ACETAMINOPHEN 500 MG
1000 TABLET ORAL ONCE
Refills: 0 | Status: COMPLETED | OUTPATIENT
Start: 2023-05-11 | End: 2023-05-11

## 2023-05-11 RX ORDER — DEXTROSE 50 % IN WATER 50 %
15 SYRINGE (ML) INTRAVENOUS ONCE
Refills: 0 | Status: DISCONTINUED | OUTPATIENT
Start: 2023-05-11 | End: 2023-05-25

## 2023-05-11 RX ORDER — ONDANSETRON 8 MG/1
4 TABLET, FILM COATED ORAL ONCE
Refills: 0 | Status: COMPLETED | OUTPATIENT
Start: 2023-05-11 | End: 2023-05-11

## 2023-05-11 RX ORDER — INSULIN LISPRO 100/ML
VIAL (ML) SUBCUTANEOUS
Refills: 0 | Status: DISCONTINUED | OUTPATIENT
Start: 2023-05-11 | End: 2023-05-25

## 2023-05-11 RX ORDER — INSULIN LISPRO 100/ML
VIAL (ML) SUBCUTANEOUS AT BEDTIME
Refills: 0 | Status: DISCONTINUED | OUTPATIENT
Start: 2023-05-11 | End: 2023-05-25

## 2023-05-11 RX ORDER — METOPROLOL TARTRATE 50 MG
25 TABLET ORAL DAILY
Refills: 0 | Status: DISCONTINUED | OUTPATIENT
Start: 2023-05-11 | End: 2023-05-25

## 2023-05-11 RX ORDER — ISOSORBIDE MONONITRATE 60 MG/1
1 TABLET, EXTENDED RELEASE ORAL
Refills: 0 | DISCHARGE

## 2023-05-11 RX ORDER — RANOLAZINE 500 MG/1
1 TABLET, FILM COATED, EXTENDED RELEASE ORAL
Refills: 0 | DISCHARGE

## 2023-05-11 RX ORDER — DEXTROSE 50 % IN WATER 50 %
12.5 SYRINGE (ML) INTRAVENOUS ONCE
Refills: 0 | Status: DISCONTINUED | OUTPATIENT
Start: 2023-05-11 | End: 2023-05-25

## 2023-05-11 RX ORDER — ASPIRIN/CALCIUM CARB/MAGNESIUM 324 MG
1 TABLET ORAL
Qty: 0 | Refills: 0 | DISCHARGE

## 2023-05-11 RX ADMIN — ISOSORBIDE MONONITRATE 30 MILLIGRAM(S): 60 TABLET, EXTENDED RELEASE ORAL at 11:43

## 2023-05-11 RX ADMIN — SODIUM CHLORIDE 75 MILLILITER(S): 9 INJECTION INTRAMUSCULAR; INTRAVENOUS; SUBCUTANEOUS at 06:59

## 2023-05-11 RX ADMIN — Medication 25 MILLIGRAM(S): at 14:39

## 2023-05-11 RX ADMIN — Medication 400 MILLIGRAM(S): at 12:22

## 2023-05-11 RX ADMIN — Medication 1000 MILLIGRAM(S): at 12:52

## 2023-05-11 RX ADMIN — ONDANSETRON 4 MILLIGRAM(S): 8 TABLET, FILM COATED ORAL at 10:14

## 2023-05-11 RX ADMIN — RANOLAZINE 500 MILLIGRAM(S): 500 TABLET, FILM COATED, EXTENDED RELEASE ORAL at 17:08

## 2023-05-11 NOTE — H&P CARDIOLOGY - HISTORY OF PRESENT ILLNESS
This is a 74 yo M with PMHx of CAD (with several stents), moderate AS, PAD, T2DM (last A1C 6.4), was admitted for angina in 3/2023. He underwent to   cardiac cath which showed  100% in RCA, then discharged with medical management, now returned for PCI in RCA.         < from: Cardiac Catheterization (03.31.23 @ 15:57) >  RCA: Right coronary artery: Angiography shows complete occlusion. . There is a 100 % stenosis. < end of copied text >    < from: TTE W or WO Ultrasound Enhancing Agent (03.30.23 @ 06:54) >   1. The left ventricular wall thickness is normal. The left ventricular systolic function is mildly decreased with an ejection fraction of 50 % by Uribe's method of disks.   2. Basal inferoseptal segment, basal inferolateral segment, and basal inferior segment are abnormal.   3. There is mild (grade 1) left ventricular diastolic dysfunction, with normal left atrial filling pressure.   4. Normal right ventricular cavity size, normal wall thickness and normal systolic function.   5. Compared to the transthoracic echocardiogram performed on 9/18/2018 LV function is similar.. There is a progression to the aortic stenosis.   6. Moderate aortic stenosis.    < end of copied text >   This is a 74 yo M with PMHx of CAD (with several stents in [facility] 6-8 years ago), moderate AS, PAD, T2DM (last A1C 6.4), was admitted for angina in 3/2023. He was admitted from 3/28 to 4/2 with angina, had cardiac cath which showed  100% in RCA, then discharged with medical management, now returned for PCI in RCA. Pt reports he was doing well and had cardiac rehab, denies chest pain, SOB or dizziness.         < from: Cardiac Catheterization (03.31.23 @ 15:57) >  RCA: Right coronary artery: Angiography shows complete occlusion. . There is a 100 % stenosis. < end of copied text >    < from: TTE W or WO Ultrasound Enhancing Agent (03.30.23 @ 06:54) >   1. The left ventricular wall thickness is normal. The left ventricular systolic function is mildly decreased with an ejection fraction of 50 % by Uribe's method of disks.   2. Basal inferoseptal segment, basal inferolateral segment, and basal inferior segment are abnormal.   3. There is mild (grade 1) left ventricular diastolic dysfunction, with normal left atrial filling pressure.   4. Normal right ventricular cavity size, normal wall thickness and normal systolic function.   5. Compared to the transthoracic echocardiogram performed on 9/18/2018 LV function is similar.. There is a progression to the aortic stenosis.   6. Moderate aortic stenosis.< end of copied text >   This is a 72 yo M with PMHx of CAD (with several stents in [facility] 6-8 years ago), moderate AS, PAD, T2DM (last A1C 6.4), was admitted for angina in 3/2023. He was admitted from 3/28 to 4/2 with angina, had cardiac cath which showed  100% in RCA, then discharged with medical management, now returned for PCI in RCA. Pt reports he was doing well and had cardiac rehab, denies chest pain, SOB or dizziness.       Card: Dr. Rubalcava    < from: Cardiac Catheterization (03.31.23 @ 15:57) >  RCA: Right coronary artery: Angiography shows complete occlusion. . There is a 100 % stenosis. < end of copied text >    < from: TTE W or WO Ultrasound Enhancing Agent (03.30.23 @ 06:54) >   1. The left ventricular wall thickness is normal. The left ventricular systolic function is mildly decreased with an ejection fraction of 50 % by Uribe's method of disks.   2. Basal inferoseptal segment, basal inferolateral segment, and basal inferior segment are abnormal.   3. There is mild (grade 1) left ventricular diastolic dysfunction, with normal left atrial filling pressure.   4. Normal right ventricular cavity size, normal wall thickness and normal systolic function.   5. Compared to the transthoracic echocardiogram performed on 9/18/2018 LV function is similar.. There is a progression to the aortic stenosis.   6. Moderate aortic stenosis.< end of copied text >

## 2023-05-11 NOTE — H&P CARDIOLOGY - NSICDXFAMILYHX_GEN_ALL_CORE_FT
FAMILY HISTORY:  Sibling  Still living? Yes, Estimated age: Age Unknown  Family history of diabetes mellitus in brother, Age at diagnosis: Age Unknown     FAMILY HISTORY:  Sibling  Still living? Yes, Estimated age: Age Unknown  Family history of diabetes mellitus in brother, Age at diagnosis: Age Unknown  Family history of early CAD, Age at diagnosis: Age Unknown

## 2023-05-11 NOTE — ASU DISCHARGE PLAN (ADULT/PEDIATRIC) - ASU DC SPECIAL INSTRUCTIONSFT
Wound Care:     The day AFTER your procedure remove bandage GENTLY, and clean using mild soap and gentle warm, water stream, pat dry. leave OPEN to air. YOU MAY SHOWER   DO NOT apply lotions, creams, ointments, powder, perfumes to your incision site  DO NOT SOAK your site for 1 week ( no baths, no pools, no tubs, etc...)  Check  your groin and /or wrist daily. A small amount of bruising, and soreness are normal    ACTIVITY: for 24 hours   - DO NOT DRIVE  - DO NOT make any important decisions or sign legal documents   - DO NOT operate heavy machineries   - you may resume sexual activity in 48 hours, unless otherwise instructed by your cardiologist     If your procedure was done through the WRIST: for the NEXT 3 DAYS:  - avoid pushing, pulling, with that affected wrist   - avoid repeated movement of that hand and wrist (eg: typing, hammering)  - DO NOT LIFT anything more than 5 lbs     If your procedure was done through the GROIN: for the NEXT 5 DAYS  - Limit climbing stairs, DO NOT soak in bathtub or pool  - no strenuous activities, pushing, pulling, straining  - Do not lift anything 10lbs or heavier     MEDICATION:   take your medications as explained ( see discharge paperwork)   If you received a STENT, you will be taking antiplatelet medications to KEEP YOUR STENT OPEN ( eg: Aspirin, Plavix, Brilinta, Effient, etc).  Take as prescribed DO NOT STOP taking them without consulting with your cardiologist first.     Follow heart healthy diet Recommended by your doctor, if you smoke STOP SMOKING ( may call [phone] for center of tobacco control if you need assistance)     CALL your doctor to make appointment in 2 WEEKS     ***CALL YOUR DOCTOR***  if you experience: fever, chills, body aches, or severe pain, swelling, redness, heat or yellow discharge at incision site  If you experience Bleeding or excruciating pain at the procedural site, swelling ( golf ball size) at your procedural site  If you experience CHEST PAIN  If you experience extremity numbness, tingling, temperature change ( of your procedural site)   If you are unable to reach your doctor, you may contact:   -Cardiology Office at [facility] at [phone] or   - [facility] [phone]  - [facility] [phone]

## 2023-05-11 NOTE — ASU DISCHARGE PLAN (ADULT/PEDIATRIC) - CARE PROVIDER_API CALL
lAana Rubalcava)  Medicine  Cardiology  [facility]- Dept of Cardiology, [address] [place] [address]  Phone: [phone]  Fax: [phone]  Follow Up Time: 2 weeks

## 2023-05-11 NOTE — ASU DISCHARGE PLAN (ADULT/PEDIATRIC) - NS MD DC FALL RISK RISK
For information on Fall & Injury Prevention, visit: [email or web address] OR  [email or web address] OR  https://www.cdc.gov/steadi/patient.html

## 2023-05-11 NOTE — CONSULT NOTE ADULT - ASSESSMENT
Patient seen and examined, agree with above assessment and plan as transcribed above.    - s/p PCI of LAD  - DAPT  - goal LDL <70  - D/C plan per IC    Jorden Anthony MD, [facility]  BEEPER [phone]

## 2023-05-11 NOTE — H&P CARDIOLOGY - NSICDXPASTMEDICALHX_GEN_ALL_CORE_FT
PAST MEDICAL HISTORY:  CAD (coronary artery disease)     Coronary artery disease with angina pectoris     Diabetes Type 2, Controoled, Uncomplicated, A1c: 6.4    HLD (hyperlipidemia)     HTN (hypertension)      PAST MEDICAL HISTORY:  Aortic stenosis     CAD (coronary artery disease)     Coronary artery disease with angina pectoris     Diabetes Type 2, Controoled, Uncomplicated, A1c: 6.4    HLD (hyperlipidemia)     HTN (hypertension)

## 2023-05-11 NOTE — PROGRESS NOTE ADULT - SUBJECTIVE AND OBJECTIVE BOX
Removal of Femoral Sheath    Pulses in the (left) lower extremity are (palpable). The patient was placed in the supine position. The insertion site was identified and the sutures were removed per protocol.  The __6__ Polish femoral sheath was then removed Chemo Hauser NP Direct pressure was applied for  ___20___ minutes.     Monitoring of the (left) groin and both lower extremities including neuro-vascular checks and vital signs every 15 minutes x 4, then every 30 minutes x 2, then every 1 hour was ordered.    Complications: None    Comments: Good Hemostasis noted  Left groin no bleeding or hematoma noted   Continue DAPT   Continue to monitor closely   Marleny Hendrickson Np   [phone]

## 2023-05-11 NOTE — CONSULT NOTE ADULT - SUBJECTIVE AND OBJECTIVE BOX
C A R D I O L O G Y  *********************    DATE OF SERVICE: 05-11-23    HISTORY OF PRESENT ILLNESS: HPI:  Patient is a 72 y/o male well known to our office with PMH of hypertension, hyperlipidemia, diabetes, PAD, moderate AS, and CAD s/p stents who was sent in for PCI of RCA  however it was unable to be done but now s/p RASHMI to pLAD. Patient seen earlier post op, was still complaining of dizziness presumed to be from recent anesthesia. Denies chest pain, SOB, palpitations, or syncope.    PAST MEDICAL & SURGICAL HISTORY:  CAD (coronary artery disease)      Diabetes  Type 2, Controoled, Uncomplicated, A1c: 6.4      HLD (hyperlipidemia)      HTN (hypertension)      Coronary artery disease with angina pectoris      Aortic stenosis      S/P primary angioplasty with coronary stent  x 4 stents      History of cholecystectomy      MEDICATIONS:  MEDICATIONS  (STANDING):  dextrose 5%. 1000 milliLiter(s) (50 mL/Hr) IV Continuous <Continuous>  dextrose 5%. 1000 milliLiter(s) (100 mL/Hr) IV Continuous <Continuous>  dextrose 50% Injectable 25 Gram(s) IV Push once  dextrose 50% Injectable 25 Gram(s) IV Push once  dextrose 50% Injectable 12.5 Gram(s) IV Push once  glucagon  Injectable 1 milliGRAM(s) IntraMuscular once  insulin lispro (ADMELOG) corrective regimen sliding scale   SubCutaneous three times a day before meals  insulin lispro (ADMELOG) corrective regimen sliding scale   SubCutaneous at bedtime  isosorbide   mononitrate ER Tablet (IMDUR) 30 milliGRAM(s) Oral daily  metoprolol succinate ER 25 milliGRAM(s) Oral daily  ranolazine 500 milliGRAM(s) Oral two times a day  sodium chloride 0.9%. 300 milliLiter(s) (75 mL/Hr) IV Continuous <Continuous>      Allergies    No Known Allergies    Intolerances        FAMILY HISTORY:  Family history of diabetes mellitus in brother (Sibling)    Family history of early CAD (Sibling)      Non-contributary for premature coronary disease or sudden cardiac death    SOCIAL HISTORY:    [x ] Non-smoker  [ ] Smoker  [ ] Alcohol    FLU VACCINE THIS YEAR STARTS IN AUGUST:  [ ] Yes    [ ] No    IF OVER 65 HAVE YOU EVER HAD A PNA VACCINE:  [ ] Yes    [ ] No       [ ] N/A      REVIEW OF SYSTEMS:  [ ]chest pain  [  ]shortness of breath  [  ]palpitations  [  ]syncope  [x ]near syncope [ ]upper extremity weakness   [ ] lower extremity weakness  [  ]diplopia  [  ]altered mental status   [  ]fevers  [ ]chills [ ]nausea  [ ]vomiting  [  ]dysphagia    [ ]abdominal pain  [ ]melena  [ ]BRBPR    [  ]epistaxis  [  ]rash    [ ]lower extremity edema        [X] All others negative	  [ ] Unable to obtain      LABS:	 	    CARDIAC MARKERS:                              13.2   5.07  )-----------( 194      ( 11 May 2023 07:01 )             39.8     Hb Trend: 13.2<--    05-11    140  |  105  |  15  ----------------------------<  162<H>  4.3   |  23  |  1.11    Ca    9.0      11 May 2023 07:00      Creatinine Trend: 1.11<--    Coags:      proBNP:   Lipid Profile:   HgA1c:   TSH:         PHYSICAL EXAM:  T(C): 36.5 (05-11-23 @ 12:25), Max: 36.7 (05-11-23 @ 06:24)  HR: 84 (05-11-23 @ 15:10) (72 - 87)  BP: 113/70 (05-11-23 @ 15:10) (113/70 - 170/77)  RR: 14 (05-11-23 @ 15:10) (14 - 19)  SpO2: 95% (05-11-23 @ 15:10) (92% - 99%)  Wt(kg): --   BMI (kg/m2): 24.3 (05-11-23 @ 06:59)  I&O's Summary    11 May 2023 07:01  -  11 May 2023 16:17  --------------------------------------------------------  IN: 360 mL / OUT: 1100 mL / NET: -740 mL        Gen: NAD  HEENT:  (-)icterus (-)pallor  CV: N S1 S2 1/6 SHERRY (+)2 Pulses B/l  Resp:  Clear to auscultation B/L, normal effort  GI: (+) BS Soft, NT, ND  Lymph:  (-)Edema, (-)obvious lymphadenopathy  Skin: Warm to touch, Normal turgor  Psych: Appropriate mood and affect      TELEMETRY: NSR	      ECG: NSR, no acute ischemia or injury	    RADIOLOGY:         CXR: None    ASSESSMENT/PLAN: Patient is a 72 y/o male well known to our office with PMH of hypertension, hyperlipidemia, diabetes, PAD, moderate AS, and CAD s/p stents who was sent in for PCI of RCA  however it was unable to be done but now s/p RASHMI to pLAD.    #CAD s/p ARSHMI to pLAD  - Antiplatelet management per interventional cardiology  - Restart statin  - Continue Metoprolol, Ranexa and Imdur    #HLD  - Restart statin    #HTN  - Continue Metoprolol and Imdur    - D/C planning per interventional cardiology  - Patient has f/u with Dr. Mckeon on 5/15 at 12:45 PM    Moris Villalta PA-C  Pager: [phone]

## 2023-06-20 NOTE — PATIENT PROFILE ADULT - NSPRESCRALCFREQ_GEN_A_NUR
No care due was identified.  [facility] Embedded Care Due Messages. Reference number: [id number].   6/20/2023 3:50:54 PM CDT  
Never

## 2024-05-22 NOTE — ED ADULT NURSE NOTE - SKIN INTEGRITY
The patient's goals for the shift include Patient will remain safe and free from injury.    The clinical goals for the shift include Patient will remain calm and cooperative throughout the shift        Problem: Pain  Goal: My pain/discomfort is manageable  Outcome: Progressing     Problem: Safety  Goal: Patient will be injury free during hospitalization  Outcome: Progressing  Goal: I will remain free of falls  Outcome: Progressing     Problem: Daily Care  Goal: Daily care needs are met  Outcome: Progressing     Problem: Psychosocial Needs  Goal: Demonstrates ability to cope with hospitalization/illness  Outcome: Progressing  Goal: Collaborate with me, my family, and caregiver to identify my specific goals  Outcome: Progressing     Problem: Discharge Barriers  Goal: My discharge needs are met  Outcome: Progressing     Problem: Skin  Goal: Decreased wound size/increased tissue granulation at next dressing change  Outcome: Progressing  Goal: Participates in plan/prevention/treatment measures  Outcome: Progressing  Goal: Prevent/manage excess moisture  Outcome: Progressing  Goal: Prevent/minimize sheer/friction injuries  Outcome: Progressing  Goal: Promote/optimize nutrition  Outcome: Progressing  Goal: Promote skin healing  Outcome: Progressing     Problem: Fall/Injury  Goal: Not fall by end of shift  Outcome: Progressing  Goal: Be free from injury by end of the shift  Outcome: Progressing  Goal: Verbalize understanding of personal risk factors for fall in the hospital  Outcome: Progressing  Goal: Verbalize understanding of risk factor reduction measures to prevent injury from fall in the home  Outcome: Progressing  Goal: Use assistive devices by end of the shift  Outcome: Progressing  Goal: Pace activities to prevent fatigue by end of the shift  Outcome: Progressing     Problem: Pain - Adult  Goal: Verbalizes/displays adequate comfort level or baseline comfort level  Outcome: Progressing     Problem: Safety -  Adult  Goal: Free from fall injury  Outcome: Progressing     Problem: Discharge Planning  Goal: Discharge to home or other facility with appropriate resources  Outcome: Progressing     Problem: Chronic Conditions and Co-morbidities  Goal: Patient's chronic conditions and co-morbidity symptoms are monitored and maintained or improved  Outcome: Progressing      Wound noted to RT foot 3rd toe area with discoloration and open wound. No bleeding or drainage noted.

## 2024-05-29 ENCOUNTER — APPOINTMENT (OUTPATIENT)
Dept: ENDOCRINOLOGY | Facility: CLINIC | Age: 74
End: 2024-05-29

## 2024-07-12 NOTE — H&P ADULT - NSTOBACCOSCREENHP_GEN_A_NCS
[address], [address]  [place] [address]  PHONE: [phone]    John Grigsby  1946      SUBJECTIVE:   John Grigsby is a 77 y.o. male seen for a follow up visit regarding the following:     Chief Complaint   Patient presents with    Chest Pain       HPI:    Patient presents for acute care visit.  He has a known history of coronary artery disease with previous PCI of his RCA and LAD in April 2022.  He underwent transcatheter aortic valve replacement in June of that year with a 29 mm MANAS S3 valve.  He contacted our office yesterday with concerning symptoms of chest pain and urgent appointment was arranged.    He states he had a upper respiratory infection with a sore throat.  Some cough and chest discomfort which appears atypical.  No exertional chest pain.  Symptoms have resolved and so has his upper respiratory infection.  However, the biggest concern is his Apple watch has been seen he has atrial fibrillation and bradycardia.  He is felt weak and tired.  Some episodes of dizziness.  Has a known left bundle branch block but today is in a junctional bradycardia with a heart rate of 45.  Denies active dizziness or lightheadedness.  Appears need pacemaker placement for symptomatic bradycardia.        Past Medical History, Past Surgical History, Family history, Social History, and Medications were all reviewed with the patient today and updated as necessary.           Current Outpatient Medications:     sertraline (ZOLOFT) 25 MG tablet, Take 1 tablet by mouth daily, Disp: , Rfl:     celecoxib (CELEBREX) 200 MG capsule, Take 1 capsule by mouth daily, Disp: , Rfl:     lisinopril-hydroCHLOROthiazide (PRINZIDE;ZESTORETIC) 20-25 MG per tablet, Take 1 tablet by mouth daily, Disp: 30 tablet, Rfl: 11    pitavastatin (LIVALO) 4 MG TABS tablet, Take 1 tablet by mouth daily, Disp: 90 tablet, Rfl: 3    aspirin 81 MG EC tablet, Take 1 tablet by mouth daily, Disp: , Rfl: 
No

## 2024-10-02 PROBLEM — I25.119 ATHEROSCLEROTIC HEART DISEASE OF NATIVE CORONARY ARTERY WITH UNSPECIFIED ANGINA PECTORIS: Chronic | Status: ACTIVE | Noted: 2023-05-11

## 2024-10-02 PROBLEM — I35.0 NONRHEUMATIC AORTIC (VALVE) STENOSIS: Chronic | Status: ACTIVE | Noted: 2023-05-11

## 2024-10-22 ENCOUNTER — APPOINTMENT (OUTPATIENT)
Dept: CARDIOTHORACIC SURGERY | Facility: CLINIC | Age: 74
End: 2024-10-22
Payer: MEDICARE

## 2024-10-22 ENCOUNTER — APPOINTMENT (OUTPATIENT)
Dept: CARDIOTHORACIC SURGERY | Facility: CLINIC | Age: 74
End: 2024-10-22

## 2024-10-22 VITALS
WEIGHT: 163 LBS | SYSTOLIC BLOOD PRESSURE: 98 MMHG | OXYGEN SATURATION: 98 % | HEIGHT: 68 IN | DIASTOLIC BLOOD PRESSURE: 59 MMHG | BODY MASS INDEX: 24.71 KG/M2 | HEART RATE: 93 BPM | RESPIRATION RATE: 18 BRPM

## 2024-10-22 DIAGNOSIS — R06.00 DYSPNEA, UNSPECIFIED: ICD-10-CM

## 2024-10-22 DIAGNOSIS — E78.5 HYPERLIPIDEMIA, UNSPECIFIED: ICD-10-CM

## 2024-10-22 DIAGNOSIS — I25.9 CHRONIC ISCHEMIC HEART DISEASE, UNSPECIFIED: ICD-10-CM

## 2024-10-22 DIAGNOSIS — I35.0 NONRHEUMATIC AORTIC (VALVE) STENOSIS: ICD-10-CM

## 2024-10-22 PROCEDURE — 99205 OFFICE O/P NEW HI 60 MIN: CPT

## 2024-10-22 RX ORDER — ISOSORBIDE MONONITRATE 30 MG
30 TABLET, EXTENDED RELEASE 24 HR ORAL DAILY
Refills: 0 | Status: ACTIVE | COMMUNITY

## 2024-11-13 ENCOUNTER — APPOINTMENT (OUTPATIENT)
Dept: VASCULAR SURGERY | Facility: CLINIC | Age: 74
End: 2024-11-13
Payer: MEDICARE

## 2024-11-13 VITALS
HEART RATE: 87 BPM | DIASTOLIC BLOOD PRESSURE: 69 MMHG | WEIGHT: 163 LBS | BODY MASS INDEX: 24.71 KG/M2 | HEIGHT: 68 IN | SYSTOLIC BLOOD PRESSURE: 112 MMHG

## 2024-11-13 DIAGNOSIS — I73.9 PERIPHERAL VASCULAR DISEASE, UNSPECIFIED: ICD-10-CM

## 2024-11-13 PROCEDURE — 99214 OFFICE O/P EST MOD 30 MIN: CPT

## 2024-11-13 PROCEDURE — 93923 UPR/LXTR ART STDY 3+ LVLS: CPT

## 2025-01-20 ENCOUNTER — APPOINTMENT (OUTPATIENT)
Dept: CT IMAGING | Facility: CLINIC | Age: 75
End: 2025-01-20

## 2025-01-31 ENCOUNTER — APPOINTMENT (OUTPATIENT)
Dept: WOUND CARE | Facility: HOSPITAL | Age: 75
End: 2025-01-31
Payer: MEDICARE

## 2025-01-31 DIAGNOSIS — L97.422 NON-PRESSURE CHRONIC ULCER OF LEFT HEEL AND MIDFOOT WITH FAT LAYER EXPOSED: ICD-10-CM

## 2025-01-31 DIAGNOSIS — L97.519 NON-PRESSURE CHRONIC ULCER OF OTHER PART OF RIGHT FOOT WITH UNSPECIFIED SEVERITY: ICD-10-CM

## 2025-01-31 PROBLEM — L97.522 CHRONIC ULCER OF GREAT TOE OF LEFT FOOT WITH FAT LAYER EXPOSED: Status: ACTIVE | Noted: 2025-01-31

## 2025-01-31 PROCEDURE — 11042 DBRDMT SUBQ TIS 1ST 20SQCM/<: CPT

## 2025-01-31 PROCEDURE — 99203 OFFICE O/P NEW LOW 30 MIN: CPT | Mod: 25

## 2025-01-31 RX ORDER — MUPIROCIN 20 MG/G
2 OINTMENT TOPICAL
Qty: 1 | Refills: 6 | Status: ACTIVE | COMMUNITY
Start: 2025-01-31 | End: 1900-01-01

## 2025-02-05 ENCOUNTER — APPOINTMENT (OUTPATIENT)
Dept: MRI IMAGING | Facility: CLINIC | Age: 75
End: 2025-02-05
Payer: MEDICARE

## 2025-02-05 ENCOUNTER — OUTPATIENT (OUTPATIENT)
Dept: OUTPATIENT SERVICES | Facility: HOSPITAL | Age: 75
LOS: 1 days | End: 2025-02-05
Payer: MEDICARE

## 2025-02-05 DIAGNOSIS — L97.522 NON-PRESSURE CHRONIC ULCER OF OTHER PART OF LEFT FOOT WITH FAT LAYER EXPOSED: ICD-10-CM

## 2025-02-05 DIAGNOSIS — Z95.5 PRESENCE OF CORONARY ANGIOPLASTY IMPLANT AND GRAFT: Chronic | ICD-10-CM

## 2025-02-05 DIAGNOSIS — Z00.8 ENCOUNTER FOR OTHER GENERAL EXAMINATION: ICD-10-CM

## 2025-02-05 DIAGNOSIS — Z90.49 ACQUIRED ABSENCE OF OTHER SPECIFIED PARTS OF DIGESTIVE TRACT: Chronic | ICD-10-CM

## 2025-02-05 DIAGNOSIS — L97.422 NON-PRESSURE CHRONIC ULCER OF LEFT HEEL AND MIDFOOT WITH FAT LAYER EXPOSED: ICD-10-CM

## 2025-02-05 DIAGNOSIS — R06.02 SHORTNESS OF BREATH: ICD-10-CM

## 2025-02-05 PROCEDURE — 71046 X-RAY EXAM CHEST 2 VIEWS: CPT | Mod: 26

## 2025-02-05 PROCEDURE — 71046 X-RAY EXAM CHEST 2 VIEWS: CPT

## 2025-02-05 PROCEDURE — 73720 MRI LWR EXTREMITY W/O&W/DYE: CPT | Mod: 26,LT

## 2025-02-05 PROCEDURE — 73720 MRI LWR EXTREMITY W/O&W/DYE: CPT

## 2025-02-07 ENCOUNTER — APPOINTMENT (OUTPATIENT)
Dept: WOUND CARE | Facility: HOSPITAL | Age: 75
End: 2025-02-07
Payer: MEDICARE

## 2025-02-07 DIAGNOSIS — L97.522 NON-PRESSURE CHRONIC ULCER OF OTHER PART OF LEFT FOOT WITH FAT LAYER EXPOSED: ICD-10-CM

## 2025-02-07 DIAGNOSIS — E11.49 TYPE 2 DIABETES MELLITUS WITH OTHER DIABETIC NEUROLOGICAL COMPLICATION: ICD-10-CM

## 2025-02-07 PROCEDURE — 11042 DBRDMT SUBQ TIS 1ST 20SQCM/<: CPT

## 2025-02-11 ENCOUNTER — OUTPATIENT (OUTPATIENT)
Dept: OUTPATIENT SERVICES | Facility: HOSPITAL | Age: 75
LOS: 1 days | End: 2025-02-11
Payer: MEDICARE

## 2025-02-11 ENCOUNTER — APPOINTMENT (OUTPATIENT)
Dept: CARDIOTHORACIC SURGERY | Facility: CLINIC | Age: 75
End: 2025-02-11
Payer: MEDICARE

## 2025-02-11 ENCOUNTER — RESULT REVIEW (OUTPATIENT)
Age: 75
End: 2025-02-11

## 2025-02-11 VITALS
HEART RATE: 94 BPM | SYSTOLIC BLOOD PRESSURE: 111 MMHG | TEMPERATURE: 97.5 F | DIASTOLIC BLOOD PRESSURE: 71 MMHG | OXYGEN SATURATION: 98 % | RESPIRATION RATE: 16 BRPM

## 2025-02-11 DIAGNOSIS — I10 ESSENTIAL (PRIMARY) HYPERTENSION: ICD-10-CM

## 2025-02-11 DIAGNOSIS — Z95.5 PRESENCE OF CORONARY ANGIOPLASTY IMPLANT AND GRAFT: Chronic | ICD-10-CM

## 2025-02-11 DIAGNOSIS — R93.1 ABNORMAL FINDINGS ON DIAGNOSTIC IMAGING OF HEART AND CORONARY CIRCULATION: ICD-10-CM

## 2025-02-11 DIAGNOSIS — I50.20 UNSPECIFIED SYSTOLIC (CONGESTIVE) HEART FAILURE: ICD-10-CM

## 2025-02-11 DIAGNOSIS — I35.0 NONRHEUMATIC AORTIC (VALVE) STENOSIS: ICD-10-CM

## 2025-02-11 DIAGNOSIS — I25.5 ISCHEMIC CARDIOMYOPATHY: ICD-10-CM

## 2025-02-11 DIAGNOSIS — I25.9 CHRONIC ISCHEMIC HEART DISEASE, UNSPECIFIED: ICD-10-CM

## 2025-02-11 DIAGNOSIS — Z90.49 ACQUIRED ABSENCE OF OTHER SPECIFIED PARTS OF DIGESTIVE TRACT: Chronic | ICD-10-CM

## 2025-02-11 PROCEDURE — 93356 MYOCRD STRAIN IMG SPCKL TRCK: CPT

## 2025-02-11 PROCEDURE — 99215 OFFICE O/P EST HI 40 MIN: CPT

## 2025-02-11 PROCEDURE — 93306 TTE W/DOPPLER COMPLETE: CPT

## 2025-02-11 PROCEDURE — 93306 TTE W/DOPPLER COMPLETE: CPT | Mod: 26

## 2025-02-12 ENCOUNTER — APPOINTMENT (OUTPATIENT)
Dept: VASCULAR SURGERY | Facility: CLINIC | Age: 75
End: 2025-02-12
Payer: MEDICARE

## 2025-02-12 VITALS
HEART RATE: 103 BPM | BODY MASS INDEX: 24.55 KG/M2 | SYSTOLIC BLOOD PRESSURE: 123 MMHG | TEMPERATURE: 98 F | HEIGHT: 68 IN | WEIGHT: 162 LBS | DIASTOLIC BLOOD PRESSURE: 75 MMHG

## 2025-02-12 DIAGNOSIS — Z00.00 ENCOUNTER FOR GENERAL ADULT MEDICAL EXAMINATION W/OUT ABNORMAL FINDINGS: ICD-10-CM

## 2025-02-12 PROCEDURE — 93923 UPR/LXTR ART STDY 3+ LVLS: CPT

## 2025-02-12 PROCEDURE — 99214 OFFICE O/P EST MOD 30 MIN: CPT

## 2025-02-14 ENCOUNTER — APPOINTMENT (OUTPATIENT)
Dept: WOUND CARE | Facility: HOSPITAL | Age: 75
End: 2025-02-14
Payer: MEDICARE

## 2025-02-14 VITALS
RESPIRATION RATE: 16 BRPM | HEART RATE: 102 BPM | OXYGEN SATURATION: 98 % | TEMPERATURE: 97.3 F | DIASTOLIC BLOOD PRESSURE: 77 MMHG | SYSTOLIC BLOOD PRESSURE: 118 MMHG

## 2025-02-14 DIAGNOSIS — M86.9 OSTEOMYELITIS, UNSPECIFIED: ICD-10-CM

## 2025-02-14 DIAGNOSIS — Z95.5 PRESENCE OF CORONARY ANGIOPLASTY IMPLANT AND GRAFT: ICD-10-CM

## 2025-02-14 DIAGNOSIS — E11.9 TYPE 2 DIABETES MELLITUS W/OUT COMPLICATIONS: ICD-10-CM

## 2025-02-14 DIAGNOSIS — L97.422 NON-PRESSURE CHRONIC ULCER OF LEFT HEEL AND MIDFOOT WITH FAT LAYER EXPOSED: ICD-10-CM

## 2025-02-14 PROCEDURE — 99205 OFFICE O/P NEW HI 60 MIN: CPT

## 2025-02-14 PROCEDURE — 11042 DBRDMT SUBQ TIS 1ST 20SQCM/<: CPT

## 2025-02-16 ENCOUNTER — OUTPATIENT (OUTPATIENT)
Dept: OUTPATIENT SERVICES | Facility: HOSPITAL | Age: 75
LOS: 1 days | End: 2025-02-16
Payer: MEDICARE

## 2025-02-16 DIAGNOSIS — I35.0 NONRHEUMATIC AORTIC (VALVE) STENOSIS: ICD-10-CM

## 2025-02-16 DIAGNOSIS — Z95.5 PRESENCE OF CORONARY ANGIOPLASTY IMPLANT AND GRAFT: Chronic | ICD-10-CM

## 2025-02-16 DIAGNOSIS — Z00.8 ENCOUNTER FOR OTHER GENERAL EXAMINATION: ICD-10-CM

## 2025-02-16 DIAGNOSIS — Z90.49 ACQUIRED ABSENCE OF OTHER SPECIFIED PARTS OF DIGESTIVE TRACT: Chronic | ICD-10-CM

## 2025-02-16 PROCEDURE — 75574 CT ANGIO HRT W/3D IMAGE: CPT

## 2025-02-16 PROCEDURE — 74174 CTA ABD&PLVS W/CONTRAST: CPT

## 2025-02-16 PROCEDURE — 71275 CT ANGIOGRAPHY CHEST: CPT

## 2025-02-18 ENCOUNTER — TRANSCRIPTION ENCOUNTER (OUTPATIENT)
Age: 75
End: 2025-02-18

## 2025-02-18 ENCOUNTER — OUTPATIENT (OUTPATIENT)
Dept: OUTPATIENT SERVICES | Facility: HOSPITAL | Age: 75
LOS: 1 days | Discharge: HOME | End: 2025-02-18
Payer: MEDICARE

## 2025-02-18 VITALS
DIASTOLIC BLOOD PRESSURE: 67 MMHG | HEART RATE: 86 BPM | OXYGEN SATURATION: 100 % | RESPIRATION RATE: 17 BRPM | SYSTOLIC BLOOD PRESSURE: 124 MMHG

## 2025-02-18 VITALS
RESPIRATION RATE: 18 BRPM | SYSTOLIC BLOOD PRESSURE: 141 MMHG | HEART RATE: 107 BPM | WEIGHT: 162.04 LBS | OXYGEN SATURATION: 100 % | TEMPERATURE: 98 F | HEIGHT: 68 IN | DIASTOLIC BLOOD PRESSURE: 80 MMHG

## 2025-02-18 DIAGNOSIS — Z95.5 PRESENCE OF CORONARY ANGIOPLASTY IMPLANT AND GRAFT: Chronic | ICD-10-CM

## 2025-02-18 DIAGNOSIS — Z89.429 ACQUIRED ABSENCE OF OTHER TOE(S), UNSPECIFIED SIDE: Chronic | ICD-10-CM

## 2025-02-18 DIAGNOSIS — Z90.49 ACQUIRED ABSENCE OF OTHER SPECIFIED PARTS OF DIGESTIVE TRACT: Chronic | ICD-10-CM

## 2025-02-18 LAB
ANION GAP SERPL CALC-SCNC: 12 MMOL/L — SIGNIFICANT CHANGE UP (ref 7–14)
B-OH-BUTYR SERPL-SCNC: 0.5 MMOL/L — HIGH (ref 0–0.4)
BUN SERPL-MCNC: 15 MG/DL — SIGNIFICANT CHANGE UP (ref 7–23)
CALCIUM SERPL-MCNC: 9 MG/DL — SIGNIFICANT CHANGE UP (ref 8.4–10.5)
CHLORIDE SERPL-SCNC: 99 MMOL/L — SIGNIFICANT CHANGE UP (ref 98–107)
CO2 SERPL-SCNC: 25 MMOL/L — SIGNIFICANT CHANGE UP (ref 22–31)
CREAT SERPL-MCNC: 0.98 MG/DL — SIGNIFICANT CHANGE UP (ref 0.5–1.3)
EGFR: 81 ML/MIN/1.73M2 — SIGNIFICANT CHANGE UP
GAS PNL BLDV: SIGNIFICANT CHANGE UP
GLUCOSE BLDC GLUCOMTR-MCNC: 214 MG/DL — HIGH (ref 70–99)
GLUCOSE SERPL-MCNC: 219 MG/DL — HIGH (ref 70–99)
HCT VFR BLD CALC: 38.9 % — LOW (ref 39–50)
HGB BLD-MCNC: 12.9 G/DL — LOW (ref 13–17)
MCHC RBC-ENTMCNC: 30.2 PG — SIGNIFICANT CHANGE UP (ref 27–34)
MCHC RBC-ENTMCNC: 33.2 G/DL — SIGNIFICANT CHANGE UP (ref 32–36)
MCV RBC AUTO: 91.1 FL — SIGNIFICANT CHANGE UP (ref 80–100)
NRBC # BLD AUTO: 0 K/UL — SIGNIFICANT CHANGE UP (ref 0–0)
NRBC # FLD: 0 K/UL — SIGNIFICANT CHANGE UP (ref 0–0)
NRBC BLD AUTO-RTO: 0 /100 WBCS — SIGNIFICANT CHANGE UP (ref 0–0)
PLATELET # BLD AUTO: 259 K/UL — SIGNIFICANT CHANGE UP (ref 150–400)
POTASSIUM SERPL-MCNC: 4.1 MMOL/L — SIGNIFICANT CHANGE UP (ref 3.5–5.3)
POTASSIUM SERPL-SCNC: 4.1 MMOL/L — SIGNIFICANT CHANGE UP (ref 3.5–5.3)
RBC # BLD: 4.27 M/UL — SIGNIFICANT CHANGE UP (ref 4.2–5.8)
RBC # FLD: 12.8 % — SIGNIFICANT CHANGE UP (ref 10.3–14.5)
SODIUM SERPL-SCNC: 136 MMOL/L — SIGNIFICANT CHANGE UP (ref 135–145)
WBC # BLD: 5.97 K/UL — SIGNIFICANT CHANGE UP (ref 3.8–10.5)
WBC # FLD AUTO: 5.97 K/UL — SIGNIFICANT CHANGE UP (ref 3.8–10.5)

## 2025-02-18 PROCEDURE — 99152 MOD SED SAME PHYS/QHP 5/>YRS: CPT

## 2025-02-18 PROCEDURE — 93010 ELECTROCARDIOGRAM REPORT: CPT

## 2025-02-18 PROCEDURE — 75625 CONTRAST EXAM ABDOMINL AORTA: CPT | Mod: 26,59

## 2025-02-18 PROCEDURE — 75710 ARTERY X-RAYS ARM/LEG: CPT | Mod: 26,59,RT

## 2025-02-18 PROCEDURE — 75716 ARTERY X-RAYS ARMS/LEGS: CPT | Mod: 26

## 2025-02-18 PROCEDURE — 37224: CPT | Mod: RT

## 2025-02-18 RX ORDER — RIVAROXABAN 10 MG/1
1 TABLET, FILM COATED ORAL
Qty: 60 | Refills: 0
Start: 2025-02-18 | End: 2025-03-19

## 2025-02-18 RX ORDER — ASPIRIN 325 MG
1 TABLET ORAL
Qty: 30 | Refills: 0
Start: 2025-02-18 | End: 2025-03-19

## 2025-02-18 RX ORDER — GABAPENTIN 400 MG/1
1 CAPSULE ORAL
Refills: 0 | DISCHARGE

## 2025-02-18 RX ORDER — RANOLAZINE 1000 MG/1
1 TABLET, FILM COATED, EXTENDED RELEASE ORAL
Refills: 0 | DISCHARGE

## 2025-02-18 RX ORDER — RIVAROXABAN 10 MG/1
1 TABLET, FILM COATED ORAL
Qty: 60 | Refills: 2
Start: 2025-02-18 | End: 2025-05-18

## 2025-02-18 RX ORDER — SITAGLIPTIN 100 MG/1
1 TABLET, FILM COATED ORAL
Refills: 0 | DISCHARGE

## 2025-02-18 RX ORDER — EMPAGLIFLOZIN 25 MG/1
1 TABLET, FILM COATED ORAL
Refills: 0 | DISCHARGE

## 2025-02-18 RX ADMIN — Medication 75 MILLILITER(S): at 15:54

## 2025-02-18 NOTE — H&P CARDIOLOGY - NEUROLOGICAL
[address] Alert & oriented; no sensory, motor or coordination deficits, normal reflexes

## 2025-02-18 NOTE — ASU DISCHARGE PLAN (ADULT/PEDIATRIC) - CARE PROVIDER_API CALL
Catherine Browne  Vascular Surgery  [address], [address]  [place] [address]  Phone: [phone]  Fax: [phone]  Follow Up Time:

## 2025-02-18 NOTE — ASU DISCHARGE PLAN (ADULT/PEDIATRIC) - FINANCIAL ASSISTANCE
[facility] provides services at a reduced cost to those who are determined to be eligible through [facility]’s financial assistance program. Information regarding [facility]’s financial assistance program can be found by going to [email or web address] or by calling [phone].

## 2025-02-18 NOTE — ASU PATIENT PROFILE, ADULT - NS PRO ABUSE SCREEN AFRAID ANYONE YN
PSYCHO-ONCOLOGY NOTE/ Individual Psychotherapy     Date: 3/14/2023   Site:  [facility]      Therapeutic Intervention: Met with patient.  Outpatient - Insight oriented psychotherapy 45 min - CPT code 40616 and Outpatient - Supportive psychotherapy 45 min - CPT Code 53760    This includes face to face time and non-face to face time preparing to see the patient, obtaining and/or reviewing separately obtained history, documenting clinical information in the electronic or other health record, independently interpreting results and communicating results to the patient/family/caregiver, or care coordinator.      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    Anxiety    Insomnia    Insomnia    Anxiety    Other constipation    Chemotherapy-induced nausea    Generalized anxiety disorder with panic attacks    Chemotherapy adverse reaction    Mucositis due to chemotherapy    Morbid obesity    Secondary adenocarcinoma of lymph node    Thyroid nodule    Hypercalcemia    Transaminitis    Dysuria    Chemotherapy-induced neutropenia    Hypophosphatemia    Functional diarrhea    Primary hypertension       Chief complaint/reason for encounter: adjustment to illness, depression and anxiety      Met with patient to evaluate psychosocial adaptation to diagnosis/treatment of metastatic colon cancer    Current Medications  Current Outpatient Medications   Medication    acetaminophen (TYLENOL) 500 MG tablet    benzonatate (TESSALON PERLES) 100 MG capsule    buPROPion (WELLBUTRIN SR) 150 MG TBSR 12 hr tablet    busPIRone (BUSPAR) 10 MG tablet     capecitabine (XELODA) 500 MG Tab    granisetron (SANCUSO) 3.1 mg/24 hour    Lactobacillus rhamnosus GG (CULTURELLE) 10 billion cell capsule    lactulose (CHRONULAC) 10 gram/15 mL solution    levothyroxine (SYNTHROID) 200 MCG tablet    LIDOcaine-prilocaine (EMLA) cream    LORazepam (ATIVAN) 1 MG tablet    magic mouthwash diphen/antac/lidoc/nysta    multivitamin (THERAGRAN) per tablet    olmesartan (BENICAR) 20 MG tablet    ondansetron (ZOFRAN-ODT) 8 MG TbDL    prochlorperazine (COMPAZINE) 10 MG tablet    promethazine (PHENERGAN) 12.5 MG Tab    senna-docusate 8.6-50 mg (PERICOLACE) 8.6-50 mg per tablet    traMADoL (ULTRAM) 50 mg tablet    traZODone (DESYREL) 50 MG tablet     No current facility-administered medications for this visit.       ONCOLOGY HISTORY  Oncology History   Malignant neoplasm of sigmoid colon   3/16/2020 Initial Diagnosis    Malignant neoplasm of sigmoid colon     3/31/2020 Cancer Staged    Staging form: Colon and Rectum, AJCC 8th Edition  - Clinical stage from 3/31/2020: Stage IIIC (cT4b, cN2a, cM0)       5/6/2020 - 11/17/2020 Chemotherapy    Treatment Summary   Plan Name: OP FOLFOX 6 Q2W  Treatment Goal: Curative  Status: Inactive  Start Date: 5/6/2020  End Date: 11/6/2020  Provider: Dylan Leyva MD  Chemotherapy: fluorouraciL injection 945 mg, 400 mg/m2 = 945 mg, Intravenous, [facility] 1 time, 14 of 14 cycles  Administration: 945 mg (5/6/2020), 945 mg (5/20/2020), 945 mg (6/3/2020), 945 mg (6/17/2020), 945 mg (7/29/2020), 945 mg (8/12/2020), 945 mg (8/26/2020), 945 mg (9/9/2020), 945 mg (9/23/2020), 945 mg (10/6/2020), 945 mg (10/21/2020), 945 mg (11/4/2020)  fluorouraciL 2,400 mg/m2 = 5,665 mg in sodium chloride 0.9% 240 mL chemo infusion, 2,400 mg/m2 = 5,665 mg, Intravenous, Over 46 hours, 14 of 14 cycles  Administration: 5,665 mg (5/6/2020), 5,665 mg (5/20/2020), 5,665 mg (6/3/2020), 5,665 mg (6/17/2020), 5,665 mg (7/29/2020), 5,665 mg (8/12/2020), 5,665 mg (8/26/2020), 5,665 mg  (9/9/2020), 5,665 mg (9/23/2020), 5,665 mg (10/6/2020), 5,665 mg (10/21/2020), 5,665 mg (11/4/2020)  leucovorin calcium 900 mg in dextrose 5 % 250 mL infusion, 945 mg, Intravenous, [facility] 1 time, 14 of 14 cycles  Administration: 900 mg (5/6/2020), 900 mg (5/20/2020), 900 mg (6/3/2020), 900 mg (6/17/2020), 945 mg (6/30/2020), 945 mg (7/20/2020), 945 mg (7/29/2020), 945 mg (8/12/2020), 945 mg (8/26/2020), 945 mg (9/9/2020), 945 mg (9/23/2020), 945 mg (10/6/2020), 945 mg (10/21/2020), 945 mg (11/4/2020)  oxaliplatin (ELOXATIN) 200 mg in dextrose 5 % 500 mL chemo infusion, 201 mg, Intravenous, [facility] 1 time, 6 of 6 cycles  Dose modification: 65 mg/m2 (original dose 85 mg/m2, Cycle 6)  Administration: 200 mg (5/6/2020), 200 mg (5/20/2020), 200 mg (6/3/2020), 200 mg (6/17/2020), 201 mg (6/30/2020), 150 mg (7/20/2020)       6/16/2021 -  Chemotherapy    Treatment Summary   Plan Name: OP COLORECTAL FOLFIRI + BEVACIZUMAB Q2W  Treatment Goal: Control  Status: Active  Start Date: 6/16/2021  End Date: 5/11/2023 (Planned)  Provider: Marah Sanot MD  Chemotherapy: fluorouraciL injection 990 mg, 400 mg/m2 = 990 mg, Intravenous, [facility] 1 time, 15 of 15 cycles  Administration: 990 mg (6/16/2021), 990 mg (6/30/2021), 990 mg (7/14/2021), 980 mg (7/28/2021), 990 mg (8/11/2021), 990 mg (8/25/2021), 990 mg (9/8/2021), 990 mg (9/22/2021), 990 mg (10/6/2021), 990 mg (10/20/2021), 990 mg (11/3/2021), 990 mg (12/1/2021), 990 mg (12/15/2021), 990 mg (11/17/2021), 990 mg (12/28/2021)  fluorouraciL 2,400 mg/m2 = 5,950 mg in sodium chloride 0.9% 240 mL chemo infusion, 2,400 mg/m2 = 5,950 mg, Intravenous, Over 46 hours, 36 of 40 cycles  Administration: 5,950 mg (6/16/2021), 5,950 mg (6/30/2021), 5,950 mg (7/14/2021), 5,880 mg (7/28/2021), 5,930 mg (8/11/2021), 5,930 mg (8/25/2021), 5,930 mg (9/8/2021), 5,930 mg (9/22/2021), 5,930 mg (10/6/2021), 5,930 mg (10/20/2021), 5,930 mg (11/3/2021), 5,930 mg (12/1/2021), 5,930 mg  (12/15/2021), 5,930 mg (11/17/2021), 5,930 mg (12/28/2021), 6,050 mg (5/11/2022), 6,025 mg (3/9/2022), 6,025 mg (2/23/2022), 5,930 mg (2/2/2022), 5,930 mg (1/19/2022), 6,050 mg (4/20/2022), 6,025 mg (4/6/2022), 6,025 mg (3/23/2022), 6,050 mg (6/1/2022), 6,050 mg (6/15/2022), 6,050 mg (10/19/2022), 6,050 mg (10/5/2022), 6,050 mg (11/2/2022), 6,050 mg (11/16/2022), 6,050 mg (11/30/2022), 6,050 mg (1/4/2023), 6,050 mg (12/19/2022), 6,050 mg (1/18/2023), 6,000 mg (2/28/2023), 6,050 mg (2/14/2023), 6,000 mg (2/1/2023)  bevacizumab (AVASTIN) 600 mg in sodium chloride 0.9% 100 mL chemo infusion, 660 mg, Intravenous, [facility] 1 time, 34 of 39 cycles  Dose modification: 5 mg/kg (original dose 5 mg/kg, Cycle 26, Reason: MD Discretion, Comment: 5 mg/kg original dose)  Administration: 600 mg (6/30/2021), 600 mg (7/14/2021), 600 mg (7/28/2021), 600 mg (6/16/2021), 600 mg (8/11/2021), 655 mg (8/25/2021), 600 mg (9/8/2021), 600 mg (9/22/2021), 600 mg (10/6/2021), 600 mg (10/20/2021), 600 mg (11/3/2021), 655 mg (12/1/2021), 600 mg (12/15/2021), 600 mg (11/17/2021), 600 mg (12/28/2021), 600 mg (5/11/2022), 600 mg (3/9/2022), 600 mg (2/23/2022), 600 mg (2/2/2022), 600 mg (1/19/2022), 600 mg (4/20/2022), 680 mg (4/6/2022), 600 mg (3/23/2022), 680 mg (10/5/2022), 680 mg (10/19/2022), 680 mg (11/2/2022), 680 mg (11/16/2022), 680 mg (11/30/2022), 680 mg (1/4/2023), 680 mg (12/19/2022), 680 mg (1/18/2023), 680 mg (2/28/2023), 680 mg (2/14/2023), 680 mg (2/1/2023)  irinotecan (CAMPTOSAR) 440 mg in sodium chloride 0.9% 500 mL chemo infusion, 446 mg, Intravenous, [facility] 1 time, 36 of 41 cycles  Dose modification: 180 mg/m2 (original dose 180 mg/m2, Cycle 24)  Administration: 440 mg (6/16/2021), 440 mg (6/30/2021), 440 mg (7/14/2021), 440 mg (7/28/2021), 440 mg (8/11/2021), 444 mg (8/25/2021), 440 mg (9/8/2021), 440 mg (9/22/2021), 440 mg (10/6/2021), 440 mg (10/20/2021), 440 mg (11/3/2021), 440 mg (12/1/2021), 440 mg (12/15/2021), 440  mg (11/17/2021), 440 mg (12/28/2021), 440 mg (5/11/2022), 440 mg (3/9/2022), 440 mg (2/23/2022), 440 mg (2/2/2022), 440 mg (1/19/2022), 440 mg (4/20/2022), 440 mg (4/6/2022), 440 mg (3/24/2022), 440 mg (6/1/2022), 440 mg (6/15/2022), 440 mg (10/19/2022), 440 mg (10/5/2022), 440 mg (11/2/2022), 440 mg (11/16/2022), 440 mg (11/30/2022), 440 mg (1/4/2023), 440 mg (12/19/2022), 440 mg (1/18/2023), 440 mg (2/28/2023), 440 mg (2/14/2023), 440 mg (2/1/2023)       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   4/3/2020 Initial Diagnosis    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     5/6/2020 - 11/17/2020 Chemotherapy    Treatment Summary   Plan Name: OP FOLFOX 6 Q2W  Treatment Goal: Curative  Status: Inactive  Start Date: 5/6/2020  End Date: 11/6/2020  Provider: Dylan Leyva MD  Chemotherapy: fluorouraciL injection 945 mg, 400 mg/m2 = 945 mg, Intravenous, [facility] 1 time, 14 of 14 cycles  Administration: 945 mg (5/6/2020), 945 mg (5/20/2020), 945 mg (6/3/2020), 945 mg (6/17/2020), 945 mg (7/29/2020), 945 mg (8/12/2020), 945 mg (8/26/2020), 945 mg (9/9/2020), 945 mg (9/23/2020), 945 mg (10/6/2020), 945 mg (10/21/2020), 945 mg (11/4/2020)  fluorouraciL 2,400 mg/m2 = 5,665 mg in sodium chloride 0.9% 240 mL chemo infusion, 2,400 mg/m2 = 5,665 mg, Intravenous, Over 46 hours, 14 of 14 cycles  Administration: 5,665 mg (5/6/2020), 5,665 mg (5/20/2020), 5,665 mg (6/3/2020), 5,665 mg (6/17/2020), 5,665 mg (7/29/2020), 5,665 mg (8/12/2020), 5,665 mg (8/26/2020), 5,665 mg (9/9/2020), 5,665 mg (9/23/2020), 5,665 mg (10/6/2020), 5,665 mg (10/21/2020), 5,665 mg (11/4/2020)  leucovorin calcium 900 mg in dextrose 5 % 250 mL infusion, 945 mg, Intravenous, Clinic/[facility] 1 time, 14 of 14 cycles  Administration: 900 mg (5/6/2020), 900 mg (5/20/2020), 900 mg (6/3/2020), 900 mg (6/17/2020), 945 mg (6/30/2020), 945 mg (7/20/2020), 945 mg (7/29/2020), 945 mg (8/12/2020), 945 mg (8/26/2020), 945 mg (9/9/2020), 945 mg (9/23/2020), 945 mg (10/6/2020), 945 mg  (10/21/2020), 945 mg (11/4/2020)  oxaliplatin (ELOXATIN) 200 mg in dextrose 5 % 500 mL chemo infusion, 201 mg, Intravenous, [facility] 1 time, 6 of 6 cycles  Dose modification: 65 mg/m2 (original dose 85 mg/m2, Cycle 6)  Administration: 200 mg (5/6/2020), 200 mg (5/20/2020), 200 mg (6/3/2020), 200 mg (6/17/2020), 201 mg (6/30/2020), 150 mg (7/20/2020)       6/16/2021 -  Chemotherapy    Treatment Summary   Plan Name: OP COLORECTAL FOLFIRI + BEVACIZUMAB Q2W  Treatment Goal: Control  Status: Active  Start Date: 6/16/2021  End Date: 5/11/2023 (Planned)  Provider: Marah Santo MD  Chemotherapy: fluorouraciL injection 990 mg, 400 mg/m2 = 990 mg, Intravenous, Clinic[facility] 1 time, 15 of 15 cycles  Administration: 990 mg (6/16/2021), 990 mg (6/30/2021), 990 mg (7/14/2021), 980 mg (7/28/2021), 990 mg (8/11/2021), 990 mg (8/25/2021), 990 mg (9/8/2021), 990 mg (9/22/2021), 990 mg (10/6/2021), 990 mg (10/20/2021), 990 mg (11/3/2021), 990 mg (12/1/2021), 990 mg (12/15/2021), 990 mg (11/17/2021), 990 mg (12/28/2021)  fluorouraciL 2,400 mg/m2 = 5,950 mg in sodium chloride 0.9% 240 mL chemo infusion, 2,400 mg/m2 = 5,950 mg, Intravenous, Over 46 hours, 36 of 40 cycles  Administration: 5,950 mg (6/16/2021), 5,950 mg (6/30/2021), 5,950 mg (7/14/2021), 5,880 mg (7/28/2021), 5,930 mg (8/11/2021), 5,930 mg (8/25/2021), 5,930 mg (9/8/2021), 5,930 mg (9/22/2021), 5,930 mg (10/6/2021), 5,930 mg (10/20/2021), 5,930 mg (11/3/2021), 5,930 mg (12/1/2021), 5,930 mg (12/15/2021), 5,930 mg (11/17/2021), 5,930 mg (12/28/2021), 6,050 mg (5/11/2022), 6,025 mg (3/9/2022), 6,025 mg (2/23/2022), 5,930 mg (2/2/2022), 5,930 mg (1/19/2022), 6,050 mg (4/20/2022), 6,025 mg (4/6/2022), 6,025 mg (3/23/2022), 6,050 mg (6/1/2022), 6,050 mg (6/15/2022), 6,050 mg (10/19/2022), 6,050 mg (10/5/2022), 6,050 mg (11/2/2022), 6,050 mg (11/16/2022), 6,050 mg (11/30/2022), 6,050 mg (1/4/2023), 6,050 mg (12/19/2022), 6,050 mg (1/18/2023), 6,000 mg (2/28/2023), 6,050 mg  (2/14/2023), 6,000 mg (2/1/2023)  bevacizumab (AVASTIN) 600 mg in sodium chloride 0.9% 100 mL chemo infusion, 660 mg, Intravenous, Clinic/[facility] 1 time, 34 of 39 cycles  Dose modification: 5 mg/kg (original dose 5 mg/kg, Cycle 26, Reason: MD Discretion, Comment: 5 mg/kg original dose)  Administration: 600 mg (6/30/2021), 600 mg (7/14/2021), 600 mg (7/28/2021), 600 mg (6/16/2021), 600 mg (8/11/2021), 655 mg (8/25/2021), 600 mg (9/8/2021), 600 mg (9/22/2021), 600 mg (10/6/2021), 600 mg (10/20/2021), 600 mg (11/3/2021), 655 mg (12/1/2021), 600 mg (12/15/2021), 600 mg (11/17/2021), 600 mg (12/28/2021), 600 mg (5/11/2022), 600 mg (3/9/2022), 600 mg (2/23/2022), 600 mg (2/2/2022), 600 mg (1/19/2022), 600 mg (4/20/2022), 680 mg (4/6/2022), 600 mg (3/23/2022), 680 mg (10/5/2022), 680 mg (10/19/2022), 680 mg (11/2/2022), 680 mg (11/16/2022), 680 mg (11/30/2022), 680 mg (1/4/2023), 680 mg (12/19/2022), 680 mg (1/18/2023), 680 mg (2/28/2023), 680 mg (2/14/2023), 680 mg (2/1/2023)  irinotecan (CAMPTOSAR) 440 mg in sodium chloride 0.9% 500 mL chemo infusion, 446 mg, Intravenous, Clinic/[facility] 1 time, 36 of 41 cycles  Dose modification: 180 mg/m2 (original dose 180 mg/m2, Cycle 24)  Administration: 440 mg (6/16/2021), 440 mg (6/30/2021), 440 mg (7/14/2021), 440 mg (7/28/2021), 440 mg (8/11/2021), 444 mg (8/25/2021), 440 mg (9/8/2021), 440 mg (9/22/2021), 440 mg (10/6/2021), 440 mg (10/20/2021), 440 mg (11/3/2021), 440 mg (12/1/2021), 440 mg (12/15/2021), 440 mg (11/17/2021), 440 mg (12/28/2021), 440 mg (5/11/2022), 440 mg (3/9/2022), 440 mg (2/23/2022), 440 mg (2/2/2022), 440 mg (1/19/2022), 440 mg (4/20/2022), 440 mg (4/6/2022), 440 mg (3/24/2022), 440 mg (6/1/2022), 440 mg (6/15/2022), 440 mg (10/19/2022), 440 mg (10/5/2022), 440 mg (11/2/2022), 440 mg (11/16/2022), 440 mg (11/30/2022), 440 mg (1/4/2023), 440 mg (12/19/2022), 440 mg (1/18/2023), 440 mg (2/28/2023), 440 mg (2/14/2023), 440 mg (2/1/2023)           Objective:  Gail  Provost Mckinnon arrived promptly for the session. Ms. Mckinnon was independently ambulatory at the time of session. The patient was fully cooperative throughout the session.  Appearance: age appropriate, casually  dressed, well groomed  Behavior/Cooperation: friendly and cooperative  Speech: normal in rate, volume, and tone and appropriate quality, quantity and organization of sentences  Mood: steady  Affect: mood congruent and appropriate  Thought Process: goal-directed, logical  Thought Content: normal,  No delusions or paranoia; did not appear to be responding to internal stimuli during the session  Orientation: grossly intact  Memory: grossly intact  Attention Span/Concentration: Attends to session without distraction; reports no difficulty  Fund of Knowledge: average  Estimate of Intelligence: average from verbal skills and history  Cognition: grossly intact  Insight: patient has awareness of illness; good insight into own behavior and behavior of others  Judgment: the patient's behavior is adequate to circumstances    NCCN Distress thermometer:   DISTRESS SCREENING 3/13/2023 2/27/2023 2/21/2023 1/11/2023 1/4/2023 12/28/2022 12/9/2022   Distress Score 0 - No Distress 0 - No Distress 6 5 0 - No Distress 5 8   Practical Problems None of these None of these Housing;Insurance/Financial Housing;Insurance/Financial;Work/School;Treatment Decisions None of these Housing;Insurance/Financial;Treatment Decisions Housing;Insurance/Financial;Work/School;Treatment Decisions   Family Problems None of these None of these None of these Family Health Issues None of these Family Health Issues None of these   Emotional Problems None of these None of these Depression;Fears;Sadness;Worry Depression;Fears;Sadness;Worry None of these Depression;Fears;Sadness;Worry Depression;Fears;Sadness;Worry   Spiritual / Episcopal Concerns No No No No No No No   Physical Problems None of these None of these Fatigue;Mouth Sores;Nausea;Skin  "Dry/Itchy;Tingling in hands or feet Appearance;Fatigue;Feeling Swollen;Memory/Concentration;Mouth Sores;Nausea;Skin Dry/Itchy;Tingling in hands or feet None of these Appearance;Fatigue;Feeling Swollen;Memory/Concentration;Mouth Sores;Tingling in hands or feet Appearance;Fatigue;Feeling Swollen;Mouth Sores;Nausea;Tingling in hands or feet   Other Problems - - - - - - -   Some encounter information is confidential and restricted. Go to Review Flowsheets activity to see all data.        Interval history and content of current session: Discussed current adaptation to disease and treatment status. Reports to be coping in an adequate manner, describing stability to illness and fatigue (in spite of remodeling of home). Assessed cognitive response, paying particular attention to negative intrusive thoughts of a persistent and detrimental nature. Thoughts of this type are in evidence with moderate distress and are associated w/ allowing others (children mainly) to take responsibility. Provided cognitive behavioral therapy to address negative cognitions, discussing the impact of alteration to conceptualization-further attaching to concern that she will be "forgotten" when she is gone. Provided additional psychotherapeutic support and processed experience of seeing late individual in friend's son's new home (cleaning lady passed on job). Identified and evaluated psychosocial and environmental stressors secondary to diagnosis and treatment.  Examined proactive behaviors that may be implemented to minimize or ameliorate psychosocial stressors secondary to diagnosis and treatment.     Risk parameters:   Patient reports no suicidal ideation  Patient reports no homicidal ideation  Patient reports no self-injurious behavior  Patient reports no violent behavior   Safety needs:  None at this time      Verbal deficits: None     Patient's response to intervention:The patient's response to intervention is accepting, " motivated.     Progress toward goals and other mental status changes:  The patient's progress toward goals is good.      Progress to date:Progress - Ongoing, but Slow      Goals from last visit: Met        Patient Strengths: verbal, motivated, intelligent, successful, good social support, good insight, commitment to wellness, strong cultural traditions        Treatment Plan:individual psychotherapy  Target symptoms: depression, anxiety, adjustment  Why chosen therapy is appropriate versus another modality: relevant to diagnosis, patient responds to this modality, evidence based practice  Outcome monitoring methods: self-report, observation, tx team feedback  Therapeutic intervention type: insight oriented psychotherapy, supportive psychotherapy  Prognosis: Good                            Behavioral goals:               Exercise: continued/increased as per physician recommendations              Stress management: appropriate boundaries and accepting aid when needed              Social engagement: continued and increased especially w/ grandchildren/family, as per CDC COVID-19 guidelines              Therapy:  adaptive coping, CBT (cognitive restructuring), management of all or nothing thinking patterns, control in knowing limitations    Return to clinic: 1 month     Length of Service (minutes direct face-to-face contact): 45    Diagnosis:     ICD-10-CM ICD-9-CM   1. Moderate episode of recurrent major depressive disorder  F33.1 296.32   2. Anxiety associated with cancer diagnosis  F41.1 300.09    C80.1    3. Malignant neoplasm of sigmoid colon  C18.7 153.3                Marky Palm Psy.D.  [place] License #[id number]  [place] License #[id number]       no

## 2025-02-18 NOTE — H&P CARDIOLOGY - COMMENTS
Pre-sedation evaluation    Dentures:   Last PO intake:    Level of consciousness: 1  Obstructive sleep apnea: No  Aspiration risk: No  Mallampati score: 2  ASA Classification: 2  Prior Sedative or Anesthesia Experience: No complications  Informed consent by responsible adult: Yes  Responsible adult escort: Yes  Based on today's assessment, anesthesia consult requested: No Pre-sedation evaluation    Dentures: upper and lower  Last PO intake: 2/17/25 22:00    Level of consciousness: 1  Obstructive sleep apnea: No  Aspiration risk: No  Mallampati score: 2  ASA Classification: 2  Prior Sedative or Anesthesia Experience: No complications  Informed consent by responsible adult: Yes  Responsible adult escort: Yes  Based on today's assessment, anesthesia consult requested: No

## 2025-02-18 NOTE — ASU DISCHARGE PLAN (ADULT/PEDIATRIC) - ASU DC SPECIAL INSTRUCTIONSFT
Your procedure was performed through the [facility],  For the next 5 days:  - Limit climbing stairs.  - Do not soak in a bathtub or pool.  - Avoid strenuous activities (pushing, pulling, straining).  - Do not lift anything more than 10 pounds.    MEDICATION:   - Take your medications as explained (see attached medication reconciliation on discharge paperwork).  - If you had a stent placed, you will be taking antiplatelet medications to keep your stent open (examples: Aspirin, Plavix, Brilinta, Effient). Take your medications as prescribed. Do not stop taking them without consulting with your cardiologist/vascular surgeon first.    ADDITIONAL INSTRUCTIONS:  - Follow a heart healthy diet recommended by your doctor.   - If you smoke, we encourage smoking cessation. You may call [phone] for center of tobacco control if you need assistance.  - Call your doctor to make appointment within 2 weeks.    ***CALL YOUR DOCTOR***  - If you experience fever, chills, body aches, or severe pain, swelling, redness, heat, or yellow discharge from your incision site.  - If you notice bleeding or significant swelling at the procedural site.  - If you experience chest pain.  - If you experience extremity numbness, tingling, or temperature change.    If you are unable to get in contact with your doctor, you may contact the Interventional Recovery [facility] at [phone].  Please reference your discharge instructions for any questions or concerns.

## 2025-02-18 NOTE — H&P CARDIOLOGY - NSICDXPASTSURGICALHX_GEN_ALL_CORE_FT
PAST SURGICAL HISTORY:  History of cholecystectomy     S/P primary angioplasty with coronary stent x 4 stents     PAST SURGICAL HISTORY:  History of amputation of second toe     History of amputation of third toe     History of cholecystectomy     S/P primary angioplasty with coronary stent x 4 stents

## 2025-02-18 NOTE — ASU PATIENT PROFILE, ADULT - FALL HARM RISK - HARM RISK INTERVENTIONS
Communicate Risk of Fall with Harm to all staff/Monitor gait and stability/Reinforce activity limits and safety measures with patient and family/Tailored Fall Risk Interventions/Visual Cue: Yellow wristband and red socks/Bed in lowest position, wheels locked, appropriate side rails in place/Call bell, personal items and telephone in reach/Instruct patient to call for assistance before getting out of bed or chair/Non-slip footwear when patient is out of bed/[place] to call system/Physically safe environment - no spills, clutter or unnecessary equipment/Purposeful Proactive Rounding/Room/bathroom lighting operational, light cord in reach

## 2025-02-18 NOTE — ASU DISCHARGE PLAN (ADULT/PEDIATRIC) - COMMENTS
Start Aspirin 81 mg 1 tab/day. - Continue Plavix (Clopidogrel) 75 mg  1 tab/day  - Start Xarelto 2.5 mg 1 tab every 12 hrs. First dose to be taken tonight 2/18/25 at 9:00 pm  - Hold Metformin for 3 days, restart on 2/21/25  - Follow up with Dr. Hollie montero 1-2 weeks. - Continue Plavix (Clopidogrel) 75 mg  1 tab/day  - Start Xarelto 2.5 mg 1 tab every 12 hrs. First dose to be taken tonight 2/18/25 at 9:00 pm  - Hold Metformin for 3 days, restart on 2/21/25  - Follow up with Dr. Browne in 1-2 weeks. - Continue Plavix (Clopidogrel) 75 mg  1 tab/day  - Start Xarelto 2.5 mg 1 tab every 12 hrs. First dose to be taken tonight 2/18/25 at 9:00 pm  - Hold Metformin for 3 days, restart on 2/21/25  - Follow up with Dr. Browne in 1-2 weeks. Please make sure to get refills for Xarelto from Dr. Browne.

## 2025-02-18 NOTE — H&P CARDIOLOGY - EXTREMITIES COMMENTS
R foot - no ulcers, 3rd toe amputated  L foot - ulcer over 1st toe, heel, both covered with dressing. + ulcer on lateral plantar area, + eschar. 2nd toe amputated

## 2025-02-18 NOTE — H&P CARDIOLOGY - NSICDXFAMILYHX_GEN_ALL_CORE_FT
FAMILY HISTORY:  Sibling  Still living? Yes, Estimated age: Age Unknown  Family history of diabetes mellitus in brother, Age at diagnosis: Age Unknown  Family history of early CAD, Age at diagnosis: Age Unknown

## 2025-02-18 NOTE — H&P CARDIOLOGY - HISTORY OF PRESENT ILLNESS
74 y.o. male presents today for elective LLE angiogram.  74 y.o. male presents today for elective LLE angiogram. The patient c/o LLE claudication walking 1 block, resolve with rest. He also c/o non healing ulcers on L foot. The patient denies chest pain, SOB, palpitations, dizziness, presyncope, syncope,  headache, visual disturbances, CVA, PE, DVT, JOSE RAMON, abdominal pain, N/V/D/C, hematochezia, melena, dysuria, hematuria, fever, chills.

## 2025-02-18 NOTE — CHART NOTE - NSCHARTNOTEFT_GEN_A_CORE
The patient is s/p LLE angioplasty. As per Dr. Browne, Continue Plavix, start Xarelto 2.5 mg 1 tab every 12 hrs, first dose to be taken tonight.

## 2025-02-18 NOTE — ASU PATIENT PROFILE, ADULT - FALL HARM RISK - FALL HARM RISK
Pt attended [facility] nutrition class, Heart Healthy Meal Prep, on 2/23/22.  Pt was provided pertinent written materials related to heart healthy grocery shopping, low sodium cooking, and low saturated fat food preparation. Including a variety of fruits, vegetables, whole grains, low fat/fat-free dairy, lean proteins/meatless proteins was encouraged. Actively participated; asked appropriate questions. Educational handouts, sample meal plans, and recipes provided.   Ulcer on Lt Great toe./Other

## 2025-02-18 NOTE — H&P CARDIOLOGY - NSICDXPASTMEDICALHX_GEN_ALL_CORE_FT
PAST MEDICAL HISTORY:  Aortic stenosis     CAD (coronary artery disease)     Coronary artery disease with angina pectoris     Diabetes Type 2, Controoled, Uncomplicated, A1c: 6.4    HLD (hyperlipidemia)     HTN (hypertension)      PAST MEDICAL HISTORY:  Aortic stenosis     CAD (coronary artery disease)     Coronary artery disease with angina pectoris     Diabetes Type 2, Controoled, Uncomplicated, A1c: 6.4    DM foot ulcers     History of TIAs     HLD (hyperlipidemia)     HTN (hypertension)     PAD (peripheral artery disease)

## 2025-02-21 ENCOUNTER — APPOINTMENT (OUTPATIENT)
Dept: WOUND CARE | Facility: HOSPITAL | Age: 75
End: 2025-02-21
Payer: MEDICARE

## 2025-02-21 VITALS — HEIGHT: 68 IN | WEIGHT: 162 LBS | BODY MASS INDEX: 24.55 KG/M2

## 2025-02-21 VITALS — SYSTOLIC BLOOD PRESSURE: 106 MMHG | HEART RATE: 97 BPM | DIASTOLIC BLOOD PRESSURE: 71 MMHG

## 2025-02-21 VITALS — TEMPERATURE: 97.9 F

## 2025-02-21 VITALS — HEART RATE: 103 BPM | SYSTOLIC BLOOD PRESSURE: 116 MMHG | DIASTOLIC BLOOD PRESSURE: 76 MMHG

## 2025-02-21 DIAGNOSIS — I73.9 PERIPHERAL VASCULAR DISEASE, UNSPECIFIED: ICD-10-CM

## 2025-02-21 DIAGNOSIS — L97.514 NON-PRESSURE CHRONIC ULCER OF OTHER PART OF RIGHT FOOT WITH NECROSIS OF BONE: ICD-10-CM

## 2025-02-21 DIAGNOSIS — E11.49 TYPE 2 DIABETES MELLITUS WITH OTHER DIABETIC NEUROLOGICAL COMPLICATION: ICD-10-CM

## 2025-02-21 DIAGNOSIS — L97.522 NON-PRESSURE CHRONIC ULCER OF OTHER PART OF LEFT FOOT WITH FAT LAYER EXPOSED: ICD-10-CM

## 2025-02-21 PROBLEM — Z86.73 PERSONAL HISTORY OF TRANSIENT ISCHEMIC ATTACK (TIA), AND CEREBRAL INFARCTION WITHOUT RESIDUAL DEFICITS: Chronic | Status: ACTIVE | Noted: 2025-02-18

## 2025-02-21 PROBLEM — E11.621 TYPE 2 DIABETES MELLITUS WITH FOOT ULCER: Chronic | Status: ACTIVE | Noted: 2025-02-18

## 2025-02-21 PROCEDURE — 99214 OFFICE O/P EST MOD 30 MIN: CPT

## 2025-02-26 ENCOUNTER — APPOINTMENT (OUTPATIENT)
Dept: VASCULAR SURGERY | Facility: CLINIC | Age: 75
End: 2025-02-26

## 2025-03-07 ENCOUNTER — APPOINTMENT (OUTPATIENT)
Dept: WOUND CARE | Facility: HOSPITAL | Age: 75
End: 2025-03-07

## 2025-04-03 ENCOUNTER — RESULT REVIEW (OUTPATIENT)
Age: 75
End: 2025-04-03

## 2025-04-03 ENCOUNTER — OUTPATIENT (OUTPATIENT)
Dept: OUTPATIENT SERVICES | Facility: HOSPITAL | Age: 75
LOS: 1 days | End: 2025-04-03
Payer: MEDICARE

## 2025-04-03 ENCOUNTER — APPOINTMENT (OUTPATIENT)
Dept: MRI IMAGING | Facility: CLINIC | Age: 75
End: 2025-04-03
Payer: MEDICARE

## 2025-04-03 DIAGNOSIS — Z90.49 ACQUIRED ABSENCE OF OTHER SPECIFIED PARTS OF DIGESTIVE TRACT: Chronic | ICD-10-CM

## 2025-04-03 DIAGNOSIS — L97.422 NON-PRESSURE CHRONIC ULCER OF LEFT HEEL AND MIDFOOT WITH FAT LAYER EXPOSED: ICD-10-CM

## 2025-04-03 DIAGNOSIS — Z89.429 ACQUIRED ABSENCE OF OTHER TOE(S), UNSPECIFIED SIDE: Chronic | ICD-10-CM

## 2025-04-03 DIAGNOSIS — Z95.5 PRESENCE OF CORONARY ANGIOPLASTY IMPLANT AND GRAFT: Chronic | ICD-10-CM

## 2025-04-03 PROCEDURE — 73720 MRI LWR EXTREMITY W/O&W/DYE: CPT | Mod: 26,LT

## 2025-04-03 PROCEDURE — A9585: CPT

## 2025-04-03 PROCEDURE — 73720 MRI LWR EXTREMITY W/O&W/DYE: CPT

## (undated) DEVICE — PACKING GAUZE IODOFORM 2"

## (undated) DEVICE — DRAPE 3/4 SHEET W REINFORCEMENT 56X77"

## (undated) DEVICE — DRSG KLING 4"

## (undated) DEVICE — DRSG ACE BANDAGE 6"

## (undated) DEVICE — DRSG STERISTRIPS 0.5 X 4"

## (undated) DEVICE — SYR LUER LOK 10CC

## (undated) DEVICE — DRSG COMBINE 5X9"

## (undated) DEVICE — SOL IRR POUR NS 0.9% 500ML

## (undated) DEVICE — SUT POLYSORB 2-0 30" GS-21 UNDYED

## (undated) DEVICE — DRAPE MAGNETIC INSTRUMENT MEDIUM

## (undated) DEVICE — DRSG STOCKINETTE IMPERVIOUS MED

## (undated) DEVICE — PACK EXTREMITY

## (undated) DEVICE — MEDICATION LABELS W MARKER

## (undated) DEVICE — STRYKER INTERPULSE HANDPIECE W IRR SUCTION TUBE

## (undated) DEVICE — SUT PLAIN GUT 4-0 18" P-12

## (undated) DEVICE — DRSG XEROFORM 1 X 8"

## (undated) DEVICE — DRAPE INSTRUMENT POUCH 6.75" X 11"

## (undated) DEVICE — BLADE SCALPEL SAFETYLOCK #15

## (undated) DEVICE — DRSG STOCKINETTE IMPERVIOUS XL

## (undated) DEVICE — PACKING GAUZE PLAIN 2"

## (undated) DEVICE — DRAPE ISOLATION BAG 20X20"

## (undated) DEVICE — PACKING GAUZE PLAIN 0.5"

## (undated) DEVICE — BUR STRYKER OVAL SOLID CARBIDE MED 4MM

## (undated) DEVICE — STAPLER SKIN VISI-STAT 35 WIDE

## (undated) DEVICE — POSITIONER FOAM EGG CRATE ULNAR 2PCS (PINK)

## (undated) DEVICE — SOL IRR POUR H2O 250ML

## (undated) DEVICE — DRAPE 1/2 SHEET 40X57"

## (undated) DEVICE — VENODYNE/SCD SLEEVE CALF LARGE

## (undated) DEVICE — DRAPE TOWEL BLUE 17" X 24"

## (undated) DEVICE — DRAPE LIGHT HANDLE COVER (BLUE)

## (undated) DEVICE — PREP BETADINE KIT

## (undated) DEVICE — SAW BLADE MICROAIRE OSCILATING 25.4MM X 90MM X 1.27MM

## (undated) DEVICE — SAW BLADE MICROAIRE SAGITTAL 9.4MMX25.4MMX0.6MM

## (undated) DEVICE — FOLEY TRAY 16FR 5CC LTX UMETER CLOSED

## (undated) DEVICE — GLV 8 PROTEXIS (WHITE)

## (undated) DEVICE — MARKING PEN W RULER

## (undated) DEVICE — LAP PAD 18 X 18"

## (undated) DEVICE — SAW BLADE MICROAIRE SAGITTAL 5.8X25.4X0.6 MM

## (undated) DEVICE — SUT MONOSOF 3-0 18" C-14

## (undated) DEVICE — NDL HYPO REGULAR BEVEL 25G X 1.5" (BLUE)

## (undated) DEVICE — SPECIMEN CONTAINER 100ML

## (undated) DEVICE — WARMING BLANKET UPPER ADULT